# Patient Record
Sex: MALE | Race: BLACK OR AFRICAN AMERICAN | NOT HISPANIC OR LATINO | Employment: OTHER | URBAN - METROPOLITAN AREA
[De-identification: names, ages, dates, MRNs, and addresses within clinical notes are randomized per-mention and may not be internally consistent; named-entity substitution may affect disease eponyms.]

---

## 2019-12-19 ENCOUNTER — APPOINTMENT (OUTPATIENT)
Dept: GENERAL RADIOLOGY | Facility: HOSPITAL | Age: 59
End: 2019-12-19

## 2019-12-19 ENCOUNTER — HOSPITAL ENCOUNTER (INPATIENT)
Facility: HOSPITAL | Age: 59
LOS: 13 days | Discharge: HOME | End: 2020-01-01
Attending: EMERGENCY MEDICINE | Admitting: INTERNAL MEDICINE

## 2019-12-19 DIAGNOSIS — M25.532 LEFT WRIST PAIN: Primary | ICD-10-CM

## 2019-12-19 DIAGNOSIS — M79.5 FOREIGN BODY (FB) IN SOFT TISSUE: ICD-10-CM

## 2019-12-19 DIAGNOSIS — R52 INTRACTABLE PAIN: ICD-10-CM

## 2019-12-19 DIAGNOSIS — E13.9 DIABETES MELLITUS OF OTHER TYPE WITHOUT COMPLICATION, UNSPECIFIED WHETHER LONG TERM INSULIN USE (HCC): ICD-10-CM

## 2019-12-19 DIAGNOSIS — M79.642 LEFT HAND PAIN: ICD-10-CM

## 2019-12-19 PROBLEM — F11.21 OPIOID USE DISORDER, MODERATE, IN SUSTAINED REMISSION: Chronic | Status: ACTIVE | Noted: 2019-12-19

## 2019-12-19 PROBLEM — M25.432 PAIN AND SWELLING OF WRIST, LEFT: Status: ACTIVE | Noted: 2019-12-19

## 2019-12-19 PROBLEM — F43.10 PTSD (POST-TRAUMATIC STRESS DISORDER): Chronic | Status: ACTIVE | Noted: 2019-12-19

## 2019-12-19 PROBLEM — D64.9 ANEMIA: Status: ACTIVE | Noted: 2019-12-19

## 2019-12-19 PROBLEM — F32.A DEPRESSION: Chronic | Status: ACTIVE | Noted: 2019-12-19

## 2019-12-19 PROBLEM — Z86.14 HISTORY OF METHICILLIN RESISTANT STAPHYLOCOCCUS AUREUS (MRSA): Status: ACTIVE | Noted: 2019-12-19

## 2019-12-19 LAB
ALBUMIN SERPL-MCNC: 4.2 G/DL (ref 3.5–5.2)
ALBUMIN/GLOB SERPL: 0.9 G/DL
ALP SERPL-CCNC: 114 U/L (ref 39–117)
ALT SERPL W P-5'-P-CCNC: 20 U/L (ref 1–41)
ANION GAP SERPL CALCULATED.3IONS-SCNC: 10.8 MMOL/L (ref 5–15)
ANION GAP SERPL CALCULATED.3IONS-SCNC: 8 MMOL/L (ref 5–15)
AST SERPL-CCNC: 25 U/L (ref 1–40)
BASOPHILS # BLD AUTO: 0.03 10*3/MM3 (ref 0–0.2)
BASOPHILS NFR BLD AUTO: 0.5 % (ref 0–1.5)
BILIRUB SERPL-MCNC: 0.4 MG/DL (ref 0.2–1.2)
BUN BLD-MCNC: 16 MG/DL (ref 6–20)
BUN BLD-MCNC: 16 MG/DL (ref 6–20)
BUN/CREAT SERPL: 18.2 (ref 7–25)
BUN/CREAT SERPL: 19 (ref 7–25)
CALCIUM SPEC-SCNC: 8.5 MG/DL (ref 8.6–10.5)
CALCIUM SPEC-SCNC: 9 MG/DL (ref 8.6–10.5)
CHLORIDE SERPL-SCNC: 101 MMOL/L (ref 98–107)
CHLORIDE SERPL-SCNC: 102 MMOL/L (ref 98–107)
CO2 SERPL-SCNC: 25.2 MMOL/L (ref 22–29)
CO2 SERPL-SCNC: 31 MMOL/L (ref 22–29)
CREAT BLD-MCNC: 0.84 MG/DL (ref 0.76–1.27)
CREAT BLD-MCNC: 0.88 MG/DL (ref 0.76–1.27)
CRP SERPL-MCNC: 0.21 MG/DL (ref 0–0.5)
D-LACTATE SERPL-SCNC: 1.3 MMOL/L (ref 0.5–2)
DEPRECATED RDW RBC AUTO: 47.1 FL (ref 37–54)
EOSINOPHIL # BLD AUTO: 0.08 10*3/MM3 (ref 0–0.4)
EOSINOPHIL NFR BLD AUTO: 1.2 % (ref 0.3–6.2)
ERYTHROCYTE [DISTWIDTH] IN BLOOD BY AUTOMATED COUNT: 15.3 % (ref 12.3–15.4)
ERYTHROCYTE [SEDIMENTATION RATE] IN BLOOD: 47 MM/HR (ref 0–20)
GFR SERPL CREATININE-BSD FRML MDRD: 107 ML/MIN/1.73
GFR SERPL CREATININE-BSD FRML MDRD: 113 ML/MIN/1.73
GLOBULIN UR ELPH-MCNC: 4.5 GM/DL
GLUCOSE BLD-MCNC: 97 MG/DL (ref 65–99)
GLUCOSE BLD-MCNC: 99 MG/DL (ref 65–99)
GLUCOSE BLDC GLUCOMTR-MCNC: 104 MG/DL (ref 70–130)
GLUCOSE BLDC GLUCOMTR-MCNC: 117 MG/DL (ref 70–130)
GLUCOSE BLDC GLUCOMTR-MCNC: 122 MG/DL (ref 70–130)
GLUCOSE BLDC GLUCOMTR-MCNC: 140 MG/DL (ref 70–130)
HBA1C MFR BLD: 5.5 % (ref 4.8–5.6)
HCT VFR BLD AUTO: 32.6 % (ref 37.5–51)
HGB BLD-MCNC: 10.5 G/DL (ref 13–17.7)
IMM GRANULOCYTES # BLD AUTO: 0.01 10*3/MM3 (ref 0–0.05)
IMM GRANULOCYTES NFR BLD AUTO: 0.2 % (ref 0–0.5)
LYMPHOCYTES # BLD AUTO: 1.23 10*3/MM3 (ref 0.7–3.1)
LYMPHOCYTES NFR BLD AUTO: 19.1 % (ref 19.6–45.3)
MCH RBC QN AUTO: 27.3 PG (ref 26.6–33)
MCHC RBC AUTO-ENTMCNC: 32.2 G/DL (ref 31.5–35.7)
MCV RBC AUTO: 84.9 FL (ref 79–97)
MONOCYTES # BLD AUTO: 0.68 10*3/MM3 (ref 0.1–0.9)
MONOCYTES NFR BLD AUTO: 10.6 % (ref 5–12)
MRSA DNA SPEC QL NAA+PROBE: ABNORMAL
NEUTROPHILS # BLD AUTO: 4.4 10*3/MM3 (ref 1.7–7)
NEUTROPHILS NFR BLD AUTO: 68.4 % (ref 42.7–76)
NRBC BLD AUTO-RTO: 0 /100 WBC (ref 0–0.2)
PLATELET # BLD AUTO: 196 10*3/MM3 (ref 140–450)
PMV BLD AUTO: 10.2 FL (ref 6–12)
POTASSIUM BLD-SCNC: 4.3 MMOL/L (ref 3.5–5.2)
POTASSIUM BLD-SCNC: 4.6 MMOL/L (ref 3.5–5.2)
PROT SERPL-MCNC: 8.7 G/DL (ref 6–8.5)
RBC # BLD AUTO: 3.84 10*6/MM3 (ref 4.14–5.8)
SODIUM BLD-SCNC: 138 MMOL/L (ref 136–145)
SODIUM BLD-SCNC: 140 MMOL/L (ref 136–145)
URATE SERPL-MCNC: 5.5 MG/DL (ref 3.4–7)
WBC NRBC COR # BLD: 6.43 10*3/MM3 (ref 3.4–10.8)

## 2019-12-19 PROCEDURE — 25010000002 ONDANSETRON PER 1 MG: Performed by: EMERGENCY MEDICINE

## 2019-12-19 PROCEDURE — 80053 COMPREHEN METABOLIC PANEL: CPT | Performed by: EMERGENCY MEDICINE

## 2019-12-19 PROCEDURE — 82962 GLUCOSE BLOOD TEST: CPT

## 2019-12-19 PROCEDURE — 83605 ASSAY OF LACTIC ACID: CPT | Performed by: PHYSICIAN ASSISTANT

## 2019-12-19 PROCEDURE — G0378 HOSPITAL OBSERVATION PER HR: HCPCS

## 2019-12-19 PROCEDURE — 85025 COMPLETE CBC W/AUTO DIFF WBC: CPT | Performed by: EMERGENCY MEDICINE

## 2019-12-19 PROCEDURE — 25010000002 KETOROLAC TROMETHAMINE PER 15 MG: Performed by: HOSPITALIST

## 2019-12-19 PROCEDURE — 36415 COLL VENOUS BLD VENIPUNCTURE: CPT | Performed by: EMERGENCY MEDICINE

## 2019-12-19 PROCEDURE — 25010000002 HYDROMORPHONE 1 MG/ML SOLUTION: Performed by: EMERGENCY MEDICINE

## 2019-12-19 PROCEDURE — 99223 1ST HOSP IP/OBS HIGH 75: CPT | Performed by: INTERNAL MEDICINE

## 2019-12-19 PROCEDURE — 73110 X-RAY EXAM OF WRIST: CPT

## 2019-12-19 PROCEDURE — 84550 ASSAY OF BLOOD/URIC ACID: CPT | Performed by: HOSPITALIST

## 2019-12-19 PROCEDURE — 25010000002 HYDROMORPHONE PER 4 MG: Performed by: EMERGENCY MEDICINE

## 2019-12-19 PROCEDURE — 86140 C-REACTIVE PROTEIN: CPT | Performed by: EMERGENCY MEDICINE

## 2019-12-19 PROCEDURE — 25010000002 PIPERACILLIN SOD-TAZOBACTAM PER 1 G: Performed by: HOSPITALIST

## 2019-12-19 PROCEDURE — 83036 HEMOGLOBIN GLYCOSYLATED A1C: CPT | Performed by: NURSE PRACTITIONER

## 2019-12-19 PROCEDURE — 87040 BLOOD CULTURE FOR BACTERIA: CPT | Performed by: HOSPITALIST

## 2019-12-19 PROCEDURE — 87641 MR-STAPH DNA AMP PROBE: CPT | Performed by: HOSPITALIST

## 2019-12-19 PROCEDURE — 25010000002 VANCOMYCIN 10 G RECONSTITUTED SOLUTION: Performed by: HOSPITALIST

## 2019-12-19 PROCEDURE — 36415 COLL VENOUS BLD VENIPUNCTURE: CPT

## 2019-12-19 PROCEDURE — 25010000003 CEFTRIAXONE PER 250 MG: Performed by: INTERNAL MEDICINE

## 2019-12-19 PROCEDURE — 99284 EMERGENCY DEPT VISIT MOD MDM: CPT

## 2019-12-19 PROCEDURE — 85652 RBC SED RATE AUTOMATED: CPT | Performed by: EMERGENCY MEDICINE

## 2019-12-19 RX ORDER — KETOROLAC TROMETHAMINE 30 MG/ML
30 INJECTION, SOLUTION INTRAMUSCULAR; INTRAVENOUS EVERY 6 HOURS PRN
Status: DISPENSED | OUTPATIENT
Start: 2019-12-19 | End: 2019-12-24

## 2019-12-19 RX ORDER — DEXTROSE MONOHYDRATE 25 G/50ML
25 INJECTION, SOLUTION INTRAVENOUS
Status: DISCONTINUED | OUTPATIENT
Start: 2019-12-19 | End: 2020-01-01 | Stop reason: HOSPADM

## 2019-12-19 RX ORDER — SODIUM CHLORIDE 0.9 % (FLUSH) 0.9 %
10 SYRINGE (ML) INJECTION AS NEEDED
Status: DISCONTINUED | OUTPATIENT
Start: 2019-12-19 | End: 2019-12-25

## 2019-12-19 RX ORDER — ONDANSETRON 4 MG/1
4 TABLET, ORALLY DISINTEGRATING ORAL ONCE
Status: COMPLETED | OUTPATIENT
Start: 2019-12-19 | End: 2019-12-19

## 2019-12-19 RX ORDER — ONDANSETRON 2 MG/ML
4 INJECTION INTRAMUSCULAR; INTRAVENOUS ONCE
Status: COMPLETED | OUTPATIENT
Start: 2019-12-19 | End: 2019-12-19

## 2019-12-19 RX ORDER — CALCIUM CARBONATE 200(500)MG
2 TABLET,CHEWABLE ORAL 2 TIMES DAILY PRN
Status: DISCONTINUED | OUTPATIENT
Start: 2019-12-19 | End: 2020-01-01 | Stop reason: HOSPADM

## 2019-12-19 RX ORDER — HYDROMORPHONE HYDROCHLORIDE 1 MG/ML
0.5 INJECTION, SOLUTION INTRAMUSCULAR; INTRAVENOUS; SUBCUTANEOUS ONCE
Status: COMPLETED | OUTPATIENT
Start: 2019-12-19 | End: 2019-12-19

## 2019-12-19 RX ORDER — ACETAMINOPHEN 325 MG/1
650 TABLET ORAL EVERY 4 HOURS PRN
Status: DISCONTINUED | OUTPATIENT
Start: 2019-12-19 | End: 2020-01-01 | Stop reason: HOSPADM

## 2019-12-19 RX ORDER — ONDANSETRON 2 MG/ML
4 INJECTION INTRAMUSCULAR; INTRAVENOUS EVERY 6 HOURS PRN
Status: DISCONTINUED | OUTPATIENT
Start: 2019-12-19 | End: 2020-01-01 | Stop reason: HOSPADM

## 2019-12-19 RX ORDER — HYDROXYZINE 50 MG/1
50 TABLET, FILM COATED ORAL 3 TIMES DAILY PRN
Status: DISCONTINUED | OUTPATIENT
Start: 2019-12-19 | End: 2020-01-01 | Stop reason: HOSPADM

## 2019-12-19 RX ORDER — NICOTINE POLACRILEX 4 MG
15 LOZENGE BUCCAL
Status: DISCONTINUED | OUTPATIENT
Start: 2019-12-19 | End: 2020-01-01 | Stop reason: HOSPADM

## 2019-12-19 RX ORDER — LORAZEPAM 2 MG/ML
1 INJECTION INTRAMUSCULAR ONCE
Status: COMPLETED | OUTPATIENT
Start: 2019-12-19 | End: 2019-12-20

## 2019-12-19 RX ORDER — ACETAMINOPHEN 650 MG/1
650 SUPPOSITORY RECTAL EVERY 4 HOURS PRN
Status: DISCONTINUED | OUTPATIENT
Start: 2019-12-19 | End: 2020-01-01 | Stop reason: HOSPADM

## 2019-12-19 RX ORDER — OXYCODONE HYDROCHLORIDE AND ACETAMINOPHEN 5; 325 MG/1; MG/1
2 TABLET ORAL ONCE
Status: COMPLETED | OUTPATIENT
Start: 2019-12-19 | End: 2019-12-19

## 2019-12-19 RX ORDER — CARVEDILOL 3.12 MG/1
3.12 TABLET ORAL EVERY 12 HOURS SCHEDULED
Status: DISCONTINUED | OUTPATIENT
Start: 2019-12-19 | End: 2020-01-01 | Stop reason: HOSPADM

## 2019-12-19 RX ORDER — BISACODYL 5 MG/1
5 TABLET, DELAYED RELEASE ORAL DAILY PRN
Status: DISCONTINUED | OUTPATIENT
Start: 2019-12-19 | End: 2020-01-01 | Stop reason: HOSPADM

## 2019-12-19 RX ORDER — ACETAMINOPHEN 160 MG/5ML
650 SOLUTION ORAL EVERY 4 HOURS PRN
Status: DISCONTINUED | OUTPATIENT
Start: 2019-12-19 | End: 2020-01-01 | Stop reason: HOSPADM

## 2019-12-19 RX ORDER — ONDANSETRON 4 MG/1
4 TABLET, FILM COATED ORAL EVERY 6 HOURS PRN
Status: DISCONTINUED | OUTPATIENT
Start: 2019-12-19 | End: 2020-01-01 | Stop reason: HOSPADM

## 2019-12-19 RX ORDER — SODIUM CHLORIDE 0.9 % (FLUSH) 0.9 %
10 SYRINGE (ML) INJECTION EVERY 12 HOURS SCHEDULED
Status: DISCONTINUED | OUTPATIENT
Start: 2019-12-19 | End: 2019-12-25

## 2019-12-19 RX ORDER — PRAVASTATIN SODIUM 40 MG
40 TABLET ORAL DAILY
Status: DISCONTINUED | OUTPATIENT
Start: 2019-12-19 | End: 2020-01-01 | Stop reason: HOSPADM

## 2019-12-19 RX ORDER — CEFTRIAXONE SODIUM 2 G/50ML
2 INJECTION, SOLUTION INTRAVENOUS EVERY 24 HOURS
Status: DISCONTINUED | OUTPATIENT
Start: 2019-12-19 | End: 2019-12-24

## 2019-12-19 RX ADMIN — TAZOBACTAM SODIUM AND PIPERACILLIN SODIUM 3.38 G: 375; 3 INJECTION, SOLUTION INTRAVENOUS at 13:06

## 2019-12-19 RX ADMIN — SODIUM CHLORIDE, PRESERVATIVE FREE 10 ML: 5 INJECTION INTRAVENOUS at 02:21

## 2019-12-19 RX ADMIN — CARVEDILOL 3.12 MG: 3.12 TABLET, FILM COATED ORAL at 13:42

## 2019-12-19 RX ADMIN — KETOROLAC TROMETHAMINE 30 MG: 30 INJECTION, SOLUTION INTRAMUSCULAR at 09:22

## 2019-12-19 RX ADMIN — KETOROLAC TROMETHAMINE 30 MG: 30 INJECTION, SOLUTION INTRAMUSCULAR at 22:22

## 2019-12-19 RX ADMIN — ACETAMINOPHEN 650 MG: 325 TABLET, FILM COATED ORAL at 08:16

## 2019-12-19 RX ADMIN — CEFTRIAXONE SODIUM 2 G: 2 INJECTION, SOLUTION INTRAVENOUS at 14:41

## 2019-12-19 RX ADMIN — HYDROXYZINE HYDROCHLORIDE 50 MG: 50 TABLET ORAL at 22:22

## 2019-12-19 RX ADMIN — CARVEDILOL 3.12 MG: 3.12 TABLET, FILM COATED ORAL at 21:34

## 2019-12-19 RX ADMIN — SODIUM CHLORIDE 1000 ML: 9 INJECTION, SOLUTION INTRAVENOUS at 05:22

## 2019-12-19 RX ADMIN — VANCOMYCIN HYDROCHLORIDE 1750 MG: 10 INJECTION, POWDER, LYOPHILIZED, FOR SOLUTION INTRAVENOUS at 13:06

## 2019-12-19 RX ADMIN — SODIUM CHLORIDE, PRESERVATIVE FREE 10 ML: 5 INJECTION INTRAVENOUS at 08:17

## 2019-12-19 RX ADMIN — ONDANSETRON 4 MG: 4 TABLET, ORALLY DISINTEGRATING ORAL at 01:25

## 2019-12-19 RX ADMIN — PRAVASTATIN SODIUM 40 MG: 40 TABLET ORAL at 14:41

## 2019-12-19 RX ADMIN — HYDROMORPHONE HYDROCHLORIDE 1 MG: 1 INJECTION, SOLUTION INTRAMUSCULAR; INTRAVENOUS; SUBCUTANEOUS at 02:21

## 2019-12-19 RX ADMIN — SODIUM CHLORIDE, PRESERVATIVE FREE 10 ML: 5 INJECTION INTRAVENOUS at 21:34

## 2019-12-19 RX ADMIN — HYDROMORPHONE HYDROCHLORIDE 0.5 MG: 1 INJECTION, SOLUTION INTRAMUSCULAR; INTRAVENOUS; SUBCUTANEOUS at 05:42

## 2019-12-19 RX ADMIN — ACETAMINOPHEN 650 MG: 325 TABLET, FILM COATED ORAL at 13:41

## 2019-12-19 RX ADMIN — HYDROXYZINE HYDROCHLORIDE 50 MG: 50 TABLET ORAL at 13:42

## 2019-12-19 RX ADMIN — KETOROLAC TROMETHAMINE 30 MG: 30 INJECTION, SOLUTION INTRAMUSCULAR at 16:21

## 2019-12-19 RX ADMIN — ACETAMINOPHEN 650 MG: 325 TABLET, FILM COATED ORAL at 19:48

## 2019-12-19 RX ADMIN — OXYCODONE AND ACETAMINOPHEN 2 TABLET: 5; 325 TABLET ORAL at 01:25

## 2019-12-19 RX ADMIN — ONDANSETRON 4 MG: 2 INJECTION INTRAMUSCULAR; INTRAVENOUS at 02:20

## 2019-12-20 ENCOUNTER — APPOINTMENT (OUTPATIENT)
Dept: MRI IMAGING | Facility: HOSPITAL | Age: 59
End: 2019-12-20

## 2019-12-20 PROBLEM — L03.114 CELLULITIS OF LEFT HAND: Status: ACTIVE | Noted: 2019-12-20

## 2019-12-20 PROBLEM — D50.9 IRON DEFICIENCY ANEMIA: Status: ACTIVE | Noted: 2019-12-20

## 2019-12-20 LAB
AMPHET+METHAMPHET UR QL: NEGATIVE
ANION GAP SERPL CALCULATED.3IONS-SCNC: 13.1 MMOL/L (ref 5–15)
BARBITURATES UR QL SCN: NEGATIVE
BASOPHILS # BLD AUTO: 0.03 10*3/MM3 (ref 0–0.2)
BASOPHILS NFR BLD AUTO: 0.4 % (ref 0–1.5)
BENZODIAZ UR QL SCN: NEGATIVE
BUN BLD-MCNC: 13 MG/DL (ref 6–20)
BUN/CREAT SERPL: 19.4 (ref 7–25)
CALCIUM SPEC-SCNC: 8.5 MG/DL (ref 8.6–10.5)
CANNABINOIDS SERPL QL: NEGATIVE
CHLORIDE SERPL-SCNC: 105 MMOL/L (ref 98–107)
CO2 SERPL-SCNC: 23.9 MMOL/L (ref 22–29)
COCAINE UR QL: POSITIVE
CREAT BLD-MCNC: 0.67 MG/DL (ref 0.76–1.27)
DEPRECATED RDW RBC AUTO: 45.4 FL (ref 37–54)
EOSINOPHIL # BLD AUTO: 0.07 10*3/MM3 (ref 0–0.4)
EOSINOPHIL NFR BLD AUTO: 0.8 % (ref 0.3–6.2)
ERYTHROCYTE [DISTWIDTH] IN BLOOD BY AUTOMATED COUNT: 15 % (ref 12.3–15.4)
FERRITIN SERPL-MCNC: 32.3 NG/ML (ref 30–400)
FOLATE SERPL-MCNC: 5.67 NG/ML (ref 4.78–24.2)
GFR SERPL CREATININE-BSD FRML MDRD: 147 ML/MIN/1.73
GLUCOSE BLD-MCNC: 98 MG/DL (ref 65–99)
GLUCOSE BLDC GLUCOMTR-MCNC: 140 MG/DL (ref 70–130)
GLUCOSE BLDC GLUCOMTR-MCNC: 150 MG/DL (ref 70–130)
GLUCOSE BLDC GLUCOMTR-MCNC: 181 MG/DL (ref 70–130)
GLUCOSE BLDC GLUCOMTR-MCNC: 91 MG/DL (ref 70–130)
HCT VFR BLD AUTO: 31.1 % (ref 37.5–51)
HGB BLD-MCNC: 10.6 G/DL (ref 13–17.7)
IMM GRANULOCYTES # BLD AUTO: 0.03 10*3/MM3 (ref 0–0.05)
IMM GRANULOCYTES NFR BLD AUTO: 0.4 % (ref 0–0.5)
IRON 24H UR-MRATE: 18 MCG/DL (ref 59–158)
IRON SATN MFR SERPL: 5 % (ref 20–50)
LYMPHOCYTES # BLD AUTO: 1.08 10*3/MM3 (ref 0.7–3.1)
LYMPHOCYTES NFR BLD AUTO: 13 % (ref 19.6–45.3)
MCH RBC QN AUTO: 28 PG (ref 26.6–33)
MCHC RBC AUTO-ENTMCNC: 34.1 G/DL (ref 31.5–35.7)
MCV RBC AUTO: 82.3 FL (ref 79–97)
METHADONE UR QL SCN: NEGATIVE
MONOCYTES # BLD AUTO: 0.63 10*3/MM3 (ref 0.1–0.9)
MONOCYTES NFR BLD AUTO: 7.6 % (ref 5–12)
NEUTROPHILS # BLD AUTO: 6.46 10*3/MM3 (ref 1.7–7)
NEUTROPHILS NFR BLD AUTO: 77.8 % (ref 42.7–76)
NRBC BLD AUTO-RTO: 0 /100 WBC (ref 0–0.2)
OPIATES UR QL: POSITIVE
OXYCODONE UR QL SCN: POSITIVE
PLATELET # BLD AUTO: 172 10*3/MM3 (ref 140–450)
PMV BLD AUTO: 10.2 FL (ref 6–12)
POTASSIUM BLD-SCNC: 4.3 MMOL/L (ref 3.5–5.2)
RBC # BLD AUTO: 3.78 10*6/MM3 (ref 4.14–5.8)
SODIUM BLD-SCNC: 142 MMOL/L (ref 136–145)
TIBC SERPL-MCNC: 350 MCG/DL (ref 298–536)
TRANSFERRIN SERPL-MCNC: 235 MG/DL (ref 200–360)
VIT B12 BLD-MCNC: 550 PG/ML (ref 211–946)
WBC NRBC COR # BLD: 8.3 10*3/MM3 (ref 3.4–10.8)

## 2019-12-20 PROCEDURE — 80307 DRUG TEST PRSMV CHEM ANLYZR: CPT | Performed by: INTERNAL MEDICINE

## 2019-12-20 PROCEDURE — 25010000002 LORAZEPAM PER 2 MG: Performed by: HOSPITALIST

## 2019-12-20 PROCEDURE — 25010000002 VANCOMYCIN 10 G RECONSTITUTED SOLUTION: Performed by: NURSE PRACTITIONER

## 2019-12-20 PROCEDURE — 25010000003 CEFTRIAXONE PER 250 MG: Performed by: INTERNAL MEDICINE

## 2019-12-20 PROCEDURE — 82728 ASSAY OF FERRITIN: CPT | Performed by: HOSPITALIST

## 2019-12-20 PROCEDURE — 80048 BASIC METABOLIC PNL TOTAL CA: CPT | Performed by: HOSPITALIST

## 2019-12-20 PROCEDURE — 83540 ASSAY OF IRON: CPT | Performed by: HOSPITALIST

## 2019-12-20 PROCEDURE — 82607 VITAMIN B-12: CPT | Performed by: HOSPITALIST

## 2019-12-20 PROCEDURE — 82746 ASSAY OF FOLIC ACID SERUM: CPT | Performed by: HOSPITALIST

## 2019-12-20 PROCEDURE — 84466 ASSAY OF TRANSFERRIN: CPT | Performed by: HOSPITALIST

## 2019-12-20 PROCEDURE — 25010000002 KETOROLAC TROMETHAMINE PER 15 MG: Performed by: HOSPITALIST

## 2019-12-20 PROCEDURE — 99232 SBSQ HOSP IP/OBS MODERATE 35: CPT | Performed by: INTERNAL MEDICINE

## 2019-12-20 PROCEDURE — 85025 COMPLETE CBC W/AUTO DIFF WBC: CPT | Performed by: HOSPITALIST

## 2019-12-20 PROCEDURE — 63710000001 INSULIN LISPRO (HUMAN) PER 5 UNITS: Performed by: HOSPITALIST

## 2019-12-20 PROCEDURE — 82962 GLUCOSE BLOOD TEST: CPT

## 2019-12-20 RX ORDER — TRAMADOL HYDROCHLORIDE 50 MG/1
50 TABLET ORAL EVERY 6 HOURS PRN
Status: DISPENSED | OUTPATIENT
Start: 2019-12-20 | End: 2019-12-30

## 2019-12-20 RX ADMIN — INSULIN LISPRO 3 UNITS: 100 INJECTION, SOLUTION INTRAVENOUS; SUBCUTANEOUS at 14:22

## 2019-12-20 RX ADMIN — SODIUM CHLORIDE, PRESERVATIVE FREE 10 ML: 5 INJECTION INTRAVENOUS at 11:19

## 2019-12-20 RX ADMIN — KETOROLAC TROMETHAMINE 30 MG: 30 INJECTION, SOLUTION INTRAMUSCULAR at 17:19

## 2019-12-20 RX ADMIN — TRAMADOL HYDROCHLORIDE 50 MG: 50 TABLET, FILM COATED ORAL at 18:23

## 2019-12-20 RX ADMIN — KETOROLAC TROMETHAMINE 30 MG: 30 INJECTION, SOLUTION INTRAMUSCULAR at 05:04

## 2019-12-20 RX ADMIN — CARVEDILOL 3.12 MG: 3.12 TABLET, FILM COATED ORAL at 22:12

## 2019-12-20 RX ADMIN — INSULIN LISPRO 3 UNITS: 100 INJECTION, SOLUTION INTRAVENOUS; SUBCUTANEOUS at 22:12

## 2019-12-20 RX ADMIN — ACETAMINOPHEN 650 MG: 325 TABLET, FILM COATED ORAL at 10:34

## 2019-12-20 RX ADMIN — TRAMADOL HYDROCHLORIDE 50 MG: 50 TABLET, FILM COATED ORAL at 11:19

## 2019-12-20 RX ADMIN — LORAZEPAM 1 MG: 2 INJECTION INTRAMUSCULAR; INTRAVENOUS at 18:36

## 2019-12-20 RX ADMIN — SODIUM CHLORIDE, PRESERVATIVE FREE 10 ML: 5 INJECTION INTRAVENOUS at 22:13

## 2019-12-20 RX ADMIN — KETOROLAC TROMETHAMINE 30 MG: 30 INJECTION, SOLUTION INTRAMUSCULAR at 11:19

## 2019-12-20 RX ADMIN — CARVEDILOL 3.12 MG: 3.12 TABLET, FILM COATED ORAL at 10:34

## 2019-12-20 RX ADMIN — ACETAMINOPHEN 650 MG: 325 TABLET, FILM COATED ORAL at 06:59

## 2019-12-20 RX ADMIN — CEFTRIAXONE SODIUM 2 G: 2 INJECTION, SOLUTION INTRAVENOUS at 14:23

## 2019-12-20 RX ADMIN — VANCOMYCIN HYDROCHLORIDE 1500 MG: 10 INJECTION, POWDER, LYOPHILIZED, FOR SOLUTION INTRAVENOUS at 11:19

## 2019-12-20 RX ADMIN — PRAVASTATIN SODIUM 40 MG: 40 TABLET ORAL at 10:34

## 2019-12-20 RX ADMIN — VANCOMYCIN HYDROCHLORIDE 1500 MG: 10 INJECTION, POWDER, LYOPHILIZED, FOR SOLUTION INTRAVENOUS at 00:08

## 2019-12-20 RX ADMIN — ACETAMINOPHEN 650 MG: 325 TABLET, FILM COATED ORAL at 00:08

## 2019-12-20 RX ADMIN — SODIUM CHLORIDE, PRESERVATIVE FREE 10 ML: 5 INJECTION INTRAVENOUS at 14:23

## 2019-12-21 LAB
ALBUMIN SERPL-MCNC: 3.4 G/DL (ref 3.5–5.2)
ALBUMIN/GLOB SERPL: 0.8 G/DL
ALP SERPL-CCNC: 115 U/L (ref 39–117)
ALT SERPL W P-5'-P-CCNC: 16 U/L (ref 1–41)
ANION GAP SERPL CALCULATED.3IONS-SCNC: 13.1 MMOL/L (ref 5–15)
AST SERPL-CCNC: 19 U/L (ref 1–40)
BASOPHILS # BLD AUTO: 0.03 10*3/MM3 (ref 0–0.2)
BASOPHILS NFR BLD AUTO: 0.4 % (ref 0–1.5)
BILIRUB SERPL-MCNC: 0.5 MG/DL (ref 0.2–1.2)
BUN BLD-MCNC: 12 MG/DL (ref 6–20)
BUN/CREAT SERPL: 16.7 (ref 7–25)
CALCIUM SPEC-SCNC: 8.4 MG/DL (ref 8.6–10.5)
CHLORIDE SERPL-SCNC: 99 MMOL/L (ref 98–107)
CO2 SERPL-SCNC: 23.9 MMOL/L (ref 22–29)
CREAT BLD-MCNC: 0.72 MG/DL (ref 0.76–1.27)
DEPRECATED RDW RBC AUTO: 43.9 FL (ref 37–54)
EOSINOPHIL # BLD AUTO: 0.1 10*3/MM3 (ref 0–0.4)
EOSINOPHIL NFR BLD AUTO: 1.3 % (ref 0.3–6.2)
ERYTHROCYTE [DISTWIDTH] IN BLOOD BY AUTOMATED COUNT: 14.8 % (ref 12.3–15.4)
GFR SERPL CREATININE-BSD FRML MDRD: 135 ML/MIN/1.73
GLOBULIN UR ELPH-MCNC: 4.3 GM/DL
GLUCOSE BLD-MCNC: 96 MG/DL (ref 65–99)
GLUCOSE BLDC GLUCOMTR-MCNC: 115 MG/DL (ref 70–130)
GLUCOSE BLDC GLUCOMTR-MCNC: 123 MG/DL (ref 70–130)
GLUCOSE BLDC GLUCOMTR-MCNC: 124 MG/DL (ref 70–130)
GLUCOSE BLDC GLUCOMTR-MCNC: 138 MG/DL (ref 70–130)
HCT VFR BLD AUTO: 30.7 % (ref 37.5–51)
HGB BLD-MCNC: 10.1 G/DL (ref 13–17.7)
IMM GRANULOCYTES # BLD AUTO: 0.02 10*3/MM3 (ref 0–0.05)
IMM GRANULOCYTES NFR BLD AUTO: 0.3 % (ref 0–0.5)
LYMPHOCYTES # BLD AUTO: 1.33 10*3/MM3 (ref 0.7–3.1)
LYMPHOCYTES NFR BLD AUTO: 17.2 % (ref 19.6–45.3)
MCH RBC QN AUTO: 26.9 PG (ref 26.6–33)
MCHC RBC AUTO-ENTMCNC: 32.9 G/DL (ref 31.5–35.7)
MCV RBC AUTO: 81.6 FL (ref 79–97)
MONOCYTES # BLD AUTO: 0.74 10*3/MM3 (ref 0.1–0.9)
MONOCYTES NFR BLD AUTO: 9.6 % (ref 5–12)
NEUTROPHILS # BLD AUTO: 5.52 10*3/MM3 (ref 1.7–7)
NEUTROPHILS NFR BLD AUTO: 71.2 % (ref 42.7–76)
NRBC BLD AUTO-RTO: 0 /100 WBC (ref 0–0.2)
PLATELET # BLD AUTO: 197 10*3/MM3 (ref 140–450)
PMV BLD AUTO: 10.4 FL (ref 6–12)
POTASSIUM BLD-SCNC: 4.4 MMOL/L (ref 3.5–5.2)
PROT SERPL-MCNC: 7.7 G/DL (ref 6–8.5)
RBC # BLD AUTO: 3.76 10*6/MM3 (ref 4.14–5.8)
SODIUM BLD-SCNC: 136 MMOL/L (ref 136–145)
VANCOMYCIN TROUGH SERPL-MCNC: 12.1 MCG/ML (ref 5–20)
WBC NRBC COR # BLD: 7.74 10*3/MM3 (ref 3.4–10.8)

## 2019-12-21 PROCEDURE — 80053 COMPREHEN METABOLIC PANEL: CPT | Performed by: HOSPITALIST

## 2019-12-21 PROCEDURE — 25010000003 CEFTRIAXONE PER 250 MG: Performed by: INTERNAL MEDICINE

## 2019-12-21 PROCEDURE — 25010000002 VANCOMYCIN 10 G RECONSTITUTED SOLUTION: Performed by: NURSE PRACTITIONER

## 2019-12-21 PROCEDURE — 25010000002 KETOROLAC TROMETHAMINE PER 15 MG: Performed by: HOSPITALIST

## 2019-12-21 PROCEDURE — 99232 SBSQ HOSP IP/OBS MODERATE 35: CPT | Performed by: INTERNAL MEDICINE

## 2019-12-21 PROCEDURE — 85025 COMPLETE CBC W/AUTO DIFF WBC: CPT | Performed by: HOSPITALIST

## 2019-12-21 PROCEDURE — 80202 ASSAY OF VANCOMYCIN: CPT | Performed by: NURSE PRACTITIONER

## 2019-12-21 PROCEDURE — 25010000002 ONDANSETRON PER 1 MG: Performed by: HOSPITALIST

## 2019-12-21 PROCEDURE — 82962 GLUCOSE BLOOD TEST: CPT

## 2019-12-21 RX ADMIN — KETOROLAC TROMETHAMINE 30 MG: 30 INJECTION, SOLUTION INTRAMUSCULAR at 21:23

## 2019-12-21 RX ADMIN — KETOROLAC TROMETHAMINE 30 MG: 30 INJECTION, SOLUTION INTRAMUSCULAR at 09:05

## 2019-12-21 RX ADMIN — VANCOMYCIN HYDROCHLORIDE 1500 MG: 10 INJECTION, POWDER, LYOPHILIZED, FOR SOLUTION INTRAVENOUS at 12:47

## 2019-12-21 RX ADMIN — CARVEDILOL 3.12 MG: 3.12 TABLET, FILM COATED ORAL at 21:23

## 2019-12-21 RX ADMIN — PRAVASTATIN SODIUM 40 MG: 40 TABLET ORAL at 08:40

## 2019-12-21 RX ADMIN — VANCOMYCIN HYDROCHLORIDE 1500 MG: 10 INJECTION, POWDER, LYOPHILIZED, FOR SOLUTION INTRAVENOUS at 00:05

## 2019-12-21 RX ADMIN — TRAMADOL HYDROCHLORIDE 50 MG: 50 TABLET, FILM COATED ORAL at 15:01

## 2019-12-21 RX ADMIN — KETOROLAC TROMETHAMINE 30 MG: 30 INJECTION, SOLUTION INTRAMUSCULAR at 15:01

## 2019-12-21 RX ADMIN — TRAMADOL HYDROCHLORIDE 50 MG: 50 TABLET, FILM COATED ORAL at 02:34

## 2019-12-21 RX ADMIN — SODIUM CHLORIDE, PRESERVATIVE FREE 10 ML: 5 INJECTION INTRAVENOUS at 08:42

## 2019-12-21 RX ADMIN — ONDANSETRON 4 MG: 2 INJECTION INTRAMUSCULAR; INTRAVENOUS at 15:14

## 2019-12-21 RX ADMIN — CARVEDILOL 3.12 MG: 3.12 TABLET, FILM COATED ORAL at 08:40

## 2019-12-21 RX ADMIN — TRAMADOL HYDROCHLORIDE 50 MG: 50 TABLET, FILM COATED ORAL at 08:40

## 2019-12-21 RX ADMIN — SODIUM CHLORIDE, PRESERVATIVE FREE 10 ML: 5 INJECTION INTRAVENOUS at 21:24

## 2019-12-21 RX ADMIN — ACETAMINOPHEN 650 MG: 325 TABLET, FILM COATED ORAL at 15:01

## 2019-12-21 RX ADMIN — TRAMADOL HYDROCHLORIDE 50 MG: 50 TABLET, FILM COATED ORAL at 21:23

## 2019-12-21 RX ADMIN — ACETAMINOPHEN 650 MG: 325 TABLET, FILM COATED ORAL at 02:33

## 2019-12-21 RX ADMIN — CEFTRIAXONE SODIUM 2 G: 2 INJECTION, SOLUTION INTRAVENOUS at 15:02

## 2019-12-22 LAB
ANION GAP SERPL CALCULATED.3IONS-SCNC: 10.8 MMOL/L (ref 5–15)
BUN BLD-MCNC: 9 MG/DL (ref 6–20)
BUN/CREAT SERPL: 12.9 (ref 7–25)
CALCIUM SPEC-SCNC: 8.1 MG/DL (ref 8.6–10.5)
CHLORIDE SERPL-SCNC: 104 MMOL/L (ref 98–107)
CO2 SERPL-SCNC: 25.2 MMOL/L (ref 22–29)
CREAT BLD-MCNC: 0.7 MG/DL (ref 0.76–1.27)
DEPRECATED RDW RBC AUTO: 42.9 FL (ref 37–54)
ERYTHROCYTE [DISTWIDTH] IN BLOOD BY AUTOMATED COUNT: 14.6 % (ref 12.3–15.4)
GFR SERPL CREATININE-BSD FRML MDRD: 140 ML/MIN/1.73
GLUCOSE BLD-MCNC: 114 MG/DL (ref 65–99)
GLUCOSE BLDC GLUCOMTR-MCNC: 109 MG/DL (ref 70–130)
GLUCOSE BLDC GLUCOMTR-MCNC: 113 MG/DL (ref 70–130)
GLUCOSE BLDC GLUCOMTR-MCNC: 121 MG/DL (ref 70–130)
GLUCOSE BLDC GLUCOMTR-MCNC: 123 MG/DL (ref 70–130)
HCT VFR BLD AUTO: 27.7 % (ref 37.5–51)
HGB BLD-MCNC: 9.4 G/DL (ref 13–17.7)
MCH RBC QN AUTO: 27.5 PG (ref 26.6–33)
MCHC RBC AUTO-ENTMCNC: 33.9 G/DL (ref 31.5–35.7)
MCV RBC AUTO: 81 FL (ref 79–97)
PLATELET # BLD AUTO: 167 10*3/MM3 (ref 140–450)
PMV BLD AUTO: 10.2 FL (ref 6–12)
POTASSIUM BLD-SCNC: 3.9 MMOL/L (ref 3.5–5.2)
RBC # BLD AUTO: 3.42 10*6/MM3 (ref 4.14–5.8)
SODIUM BLD-SCNC: 140 MMOL/L (ref 136–145)
WBC NRBC COR # BLD: 6.01 10*3/MM3 (ref 3.4–10.8)

## 2019-12-22 PROCEDURE — 80048 BASIC METABOLIC PNL TOTAL CA: CPT | Performed by: HOSPITALIST

## 2019-12-22 PROCEDURE — 25010000003 CEFTRIAXONE PER 250 MG: Performed by: INTERNAL MEDICINE

## 2019-12-22 PROCEDURE — 85027 COMPLETE CBC AUTOMATED: CPT | Performed by: HOSPITALIST

## 2019-12-22 PROCEDURE — 82962 GLUCOSE BLOOD TEST: CPT

## 2019-12-22 PROCEDURE — 25010000002 VANCOMYCIN 10 G RECONSTITUTED SOLUTION: Performed by: HOSPITALIST

## 2019-12-22 PROCEDURE — 25010000002 KETOROLAC TROMETHAMINE PER 15 MG: Performed by: HOSPITALIST

## 2019-12-22 RX ORDER — HYDROMORPHONE HYDROCHLORIDE 1 MG/ML
0.5 INJECTION, SOLUTION INTRAMUSCULAR; INTRAVENOUS; SUBCUTANEOUS ONCE
Status: DISCONTINUED | OUTPATIENT
Start: 2019-12-22 | End: 2019-12-23

## 2019-12-22 RX ORDER — LORAZEPAM 2 MG/ML
0.5 INJECTION INTRAMUSCULAR ONCE
Status: DISCONTINUED | OUTPATIENT
Start: 2019-12-22 | End: 2019-12-23

## 2019-12-22 RX ADMIN — TRAMADOL HYDROCHLORIDE 50 MG: 50 TABLET, FILM COATED ORAL at 09:33

## 2019-12-22 RX ADMIN — VANCOMYCIN HYDROCHLORIDE 1750 MG: 10 INJECTION, POWDER, LYOPHILIZED, FOR SOLUTION INTRAVENOUS at 11:35

## 2019-12-22 RX ADMIN — SODIUM CHLORIDE, PRESERVATIVE FREE 10 ML: 5 INJECTION INTRAVENOUS at 21:07

## 2019-12-22 RX ADMIN — SODIUM CHLORIDE, PRESERVATIVE FREE 10 ML: 5 INJECTION INTRAVENOUS at 09:38

## 2019-12-22 RX ADMIN — VANCOMYCIN HYDROCHLORIDE 1750 MG: 10 INJECTION, POWDER, LYOPHILIZED, FOR SOLUTION INTRAVENOUS at 00:08

## 2019-12-22 RX ADMIN — ACETAMINOPHEN 650 MG: 325 TABLET, FILM COATED ORAL at 13:41

## 2019-12-22 RX ADMIN — CEFTRIAXONE SODIUM 2 G: 2 INJECTION, SOLUTION INTRAVENOUS at 15:13

## 2019-12-22 RX ADMIN — VANCOMYCIN HYDROCHLORIDE 1750 MG: 10 INJECTION, POWDER, LYOPHILIZED, FOR SOLUTION INTRAVENOUS at 23:18

## 2019-12-22 RX ADMIN — CARVEDILOL 3.12 MG: 3.12 TABLET, FILM COATED ORAL at 21:07

## 2019-12-22 RX ADMIN — ACETAMINOPHEN 650 MG: 325 TABLET, FILM COATED ORAL at 09:33

## 2019-12-22 RX ADMIN — KETOROLAC TROMETHAMINE 30 MG: 30 INJECTION, SOLUTION INTRAMUSCULAR at 22:50

## 2019-12-22 RX ADMIN — TRAMADOL HYDROCHLORIDE 50 MG: 50 TABLET, FILM COATED ORAL at 03:27

## 2019-12-22 RX ADMIN — KETOROLAC TROMETHAMINE 30 MG: 30 INJECTION, SOLUTION INTRAMUSCULAR at 09:33

## 2019-12-22 RX ADMIN — PRAVASTATIN SODIUM 40 MG: 40 TABLET ORAL at 09:33

## 2019-12-22 RX ADMIN — KETOROLAC TROMETHAMINE 30 MG: 30 INJECTION, SOLUTION INTRAMUSCULAR at 03:27

## 2019-12-22 RX ADMIN — CARVEDILOL 3.12 MG: 3.12 TABLET, FILM COATED ORAL at 09:33

## 2019-12-22 RX ADMIN — KETOROLAC TROMETHAMINE 30 MG: 30 INJECTION, SOLUTION INTRAMUSCULAR at 15:53

## 2019-12-23 ENCOUNTER — APPOINTMENT (OUTPATIENT)
Dept: MRI IMAGING | Facility: HOSPITAL | Age: 59
End: 2019-12-23

## 2019-12-23 LAB
ANION GAP SERPL CALCULATED.3IONS-SCNC: 10.2 MMOL/L (ref 5–15)
BUN BLD-MCNC: 10 MG/DL (ref 6–20)
BUN/CREAT SERPL: 13 (ref 7–25)
CALCIUM SPEC-SCNC: 8.6 MG/DL (ref 8.6–10.5)
CHLORIDE SERPL-SCNC: 103 MMOL/L (ref 98–107)
CO2 SERPL-SCNC: 25.8 MMOL/L (ref 22–29)
CREAT BLD-MCNC: 0.77 MG/DL (ref 0.76–1.27)
CRP SERPL-MCNC: 1.85 MG/DL (ref 0–0.5)
DEPRECATED RDW RBC AUTO: 43.5 FL (ref 37–54)
ERYTHROCYTE [DISTWIDTH] IN BLOOD BY AUTOMATED COUNT: 14.6 % (ref 12.3–15.4)
ERYTHROCYTE [SEDIMENTATION RATE] IN BLOOD: 97 MM/HR (ref 0–20)
GFR SERPL CREATININE-BSD FRML MDRD: 125 ML/MIN/1.73
GLUCOSE BLD-MCNC: 103 MG/DL (ref 65–99)
GLUCOSE BLDC GLUCOMTR-MCNC: 100 MG/DL (ref 70–130)
GLUCOSE BLDC GLUCOMTR-MCNC: 110 MG/DL (ref 70–130)
GLUCOSE BLDC GLUCOMTR-MCNC: 115 MG/DL (ref 70–130)
GLUCOSE BLDC GLUCOMTR-MCNC: 147 MG/DL (ref 70–130)
HCT VFR BLD AUTO: 28.9 % (ref 37.5–51)
HGB BLD-MCNC: 9.8 G/DL (ref 13–17.7)
MCH RBC QN AUTO: 28.2 PG (ref 26.6–33)
MCHC RBC AUTO-ENTMCNC: 33.9 G/DL (ref 31.5–35.7)
MCV RBC AUTO: 83 FL (ref 79–97)
PLATELET # BLD AUTO: 188 10*3/MM3 (ref 140–450)
PMV BLD AUTO: 10.2 FL (ref 6–12)
POTASSIUM BLD-SCNC: 4 MMOL/L (ref 3.5–5.2)
RBC # BLD AUTO: 3.48 10*6/MM3 (ref 4.14–5.8)
SODIUM BLD-SCNC: 139 MMOL/L (ref 136–145)
VANCOMYCIN TROUGH SERPL-MCNC: 20.4 MCG/ML (ref 5–20)
WBC NRBC COR # BLD: 4.66 10*3/MM3 (ref 3.4–10.8)

## 2019-12-23 PROCEDURE — 25010000003 CEFTRIAXONE PER 250 MG: Performed by: INTERNAL MEDICINE

## 2019-12-23 PROCEDURE — 73220 MRI UPPR EXTREMITY W/O&W/DYE: CPT

## 2019-12-23 PROCEDURE — 86140 C-REACTIVE PROTEIN: CPT | Performed by: HOSPITALIST

## 2019-12-23 PROCEDURE — 80048 BASIC METABOLIC PNL TOTAL CA: CPT | Performed by: HOSPITALIST

## 2019-12-23 PROCEDURE — 99232 SBSQ HOSP IP/OBS MODERATE 35: CPT | Performed by: INTERNAL MEDICINE

## 2019-12-23 PROCEDURE — 80202 ASSAY OF VANCOMYCIN: CPT | Performed by: HOSPITALIST

## 2019-12-23 PROCEDURE — 0 GADOBENATE DIMEGLUMINE 529 MG/ML SOLUTION: Performed by: HOSPITALIST

## 2019-12-23 PROCEDURE — 25010000002 VANCOMYCIN 10 G RECONSTITUTED SOLUTION: Performed by: HOSPITALIST

## 2019-12-23 PROCEDURE — 85027 COMPLETE CBC AUTOMATED: CPT | Performed by: HOSPITALIST

## 2019-12-23 PROCEDURE — A9577 INJ MULTIHANCE: HCPCS | Performed by: HOSPITALIST

## 2019-12-23 PROCEDURE — 85652 RBC SED RATE AUTOMATED: CPT | Performed by: HOSPITALIST

## 2019-12-23 PROCEDURE — 25010000002 HYDROMORPHONE PER 4 MG: Performed by: HOSPITALIST

## 2019-12-23 PROCEDURE — 25010000002 LORAZEPAM PER 2 MG: Performed by: HOSPITALIST

## 2019-12-23 PROCEDURE — 82962 GLUCOSE BLOOD TEST: CPT

## 2019-12-23 PROCEDURE — 25010000002 KETOROLAC TROMETHAMINE PER 15 MG: Performed by: HOSPITALIST

## 2019-12-23 RX ORDER — LORAZEPAM 2 MG/ML
0.5 INJECTION INTRAMUSCULAR ONCE
Status: COMPLETED | OUTPATIENT
Start: 2019-12-23 | End: 2019-12-23

## 2019-12-23 RX ORDER — HYDROMORPHONE HYDROCHLORIDE 1 MG/ML
0.5 INJECTION, SOLUTION INTRAMUSCULAR; INTRAVENOUS; SUBCUTANEOUS ONCE
Status: COMPLETED | OUTPATIENT
Start: 2019-12-23 | End: 2019-12-23

## 2019-12-23 RX ADMIN — CEFTRIAXONE SODIUM 2 G: 2 INJECTION, SOLUTION INTRAVENOUS at 16:22

## 2019-12-23 RX ADMIN — PRAVASTATIN SODIUM 40 MG: 40 TABLET ORAL at 08:41

## 2019-12-23 RX ADMIN — CARVEDILOL 3.12 MG: 3.12 TABLET, FILM COATED ORAL at 22:16

## 2019-12-23 RX ADMIN — SODIUM CHLORIDE, PRESERVATIVE FREE 10 ML: 5 INJECTION INTRAVENOUS at 08:41

## 2019-12-23 RX ADMIN — LORAZEPAM 0.5 MG: 2 INJECTION INTRAMUSCULAR; INTRAVENOUS at 19:49

## 2019-12-23 RX ADMIN — VANCOMYCIN HYDROCHLORIDE 1500 MG: 10 INJECTION, POWDER, LYOPHILIZED, FOR SOLUTION INTRAVENOUS at 18:16

## 2019-12-23 RX ADMIN — KETOROLAC TROMETHAMINE 30 MG: 30 INJECTION, SOLUTION INTRAMUSCULAR at 18:16

## 2019-12-23 RX ADMIN — KETOROLAC TROMETHAMINE 30 MG: 30 INJECTION, SOLUTION INTRAMUSCULAR at 10:59

## 2019-12-23 RX ADMIN — TRAMADOL HYDROCHLORIDE 50 MG: 50 TABLET, FILM COATED ORAL at 01:31

## 2019-12-23 RX ADMIN — KETOROLAC TROMETHAMINE 30 MG: 30 INJECTION, SOLUTION INTRAMUSCULAR at 05:05

## 2019-12-23 RX ADMIN — GADOBENATE DIMEGLUMINE 20 ML: 529 INJECTION, SOLUTION INTRAVENOUS at 21:06

## 2019-12-23 RX ADMIN — TRAMADOL HYDROCHLORIDE 50 MG: 50 TABLET, FILM COATED ORAL at 13:06

## 2019-12-23 RX ADMIN — TRAMADOL HYDROCHLORIDE 50 MG: 50 TABLET, FILM COATED ORAL at 07:37

## 2019-12-23 RX ADMIN — CARVEDILOL 3.12 MG: 3.12 TABLET, FILM COATED ORAL at 08:41

## 2019-12-23 RX ADMIN — HYDROMORPHONE HYDROCHLORIDE 0.5 MG: 1 INJECTION, SOLUTION INTRAMUSCULAR; INTRAVENOUS; SUBCUTANEOUS at 19:49

## 2019-12-24 LAB
ANION GAP SERPL CALCULATED.3IONS-SCNC: 10.5 MMOL/L (ref 5–15)
BACTERIA SPEC AEROBE CULT: NORMAL
BACTERIA SPEC AEROBE CULT: NORMAL
BUN BLD-MCNC: 14 MG/DL (ref 6–20)
BUN/CREAT SERPL: 19.7 (ref 7–25)
CALCIUM SPEC-SCNC: 8.2 MG/DL (ref 8.6–10.5)
CHLORIDE SERPL-SCNC: 104 MMOL/L (ref 98–107)
CO2 SERPL-SCNC: 24.5 MMOL/L (ref 22–29)
CREAT BLD-MCNC: 0.71 MG/DL (ref 0.76–1.27)
DEPRECATED RDW RBC AUTO: 43 FL (ref 37–54)
ERYTHROCYTE [DISTWIDTH] IN BLOOD BY AUTOMATED COUNT: 14.4 % (ref 12.3–15.4)
GFR SERPL CREATININE-BSD FRML MDRD: 138 ML/MIN/1.73
GLUCOSE BLD-MCNC: 122 MG/DL (ref 65–99)
GLUCOSE BLDC GLUCOMTR-MCNC: 113 MG/DL (ref 70–130)
GLUCOSE BLDC GLUCOMTR-MCNC: 122 MG/DL (ref 70–130)
GLUCOSE BLDC GLUCOMTR-MCNC: 92 MG/DL (ref 70–130)
GLUCOSE BLDC GLUCOMTR-MCNC: 96 MG/DL (ref 70–130)
HCT VFR BLD AUTO: 28.2 % (ref 37.5–51)
HGB BLD-MCNC: 9.3 G/DL (ref 13–17.7)
MCH RBC QN AUTO: 27.4 PG (ref 26.6–33)
MCHC RBC AUTO-ENTMCNC: 33 G/DL (ref 31.5–35.7)
MCV RBC AUTO: 82.9 FL (ref 79–97)
PLATELET # BLD AUTO: 197 10*3/MM3 (ref 140–450)
PMV BLD AUTO: 10.5 FL (ref 6–12)
POTASSIUM BLD-SCNC: 3.8 MMOL/L (ref 3.5–5.2)
RBC # BLD AUTO: 3.4 10*6/MM3 (ref 4.14–5.8)
SODIUM BLD-SCNC: 139 MMOL/L (ref 136–145)
WBC NRBC COR # BLD: 4.05 10*3/MM3 (ref 3.4–10.8)

## 2019-12-24 PROCEDURE — 82962 GLUCOSE BLOOD TEST: CPT

## 2019-12-24 PROCEDURE — 25010000002 VANCOMYCIN 10 G RECONSTITUTED SOLUTION: Performed by: HOSPITALIST

## 2019-12-24 PROCEDURE — 99232 SBSQ HOSP IP/OBS MODERATE 35: CPT | Performed by: INTERNAL MEDICINE

## 2019-12-24 PROCEDURE — 25010000002 KETOROLAC TROMETHAMINE PER 15 MG: Performed by: HOSPITALIST

## 2019-12-24 PROCEDURE — 85027 COMPLETE CBC AUTOMATED: CPT | Performed by: HOSPITALIST

## 2019-12-24 PROCEDURE — 80048 BASIC METABOLIC PNL TOTAL CA: CPT | Performed by: HOSPITALIST

## 2019-12-24 RX ORDER — IBUPROFEN 600 MG/1
600 TABLET ORAL EVERY 6 HOURS PRN
Status: DISCONTINUED | OUTPATIENT
Start: 2019-12-24 | End: 2020-01-01 | Stop reason: HOSPADM

## 2019-12-24 RX ORDER — LINEZOLID 600 MG/1
600 TABLET, FILM COATED ORAL EVERY 12 HOURS SCHEDULED
Status: DISCONTINUED | OUTPATIENT
Start: 2019-12-24 | End: 2020-01-01 | Stop reason: HOSPADM

## 2019-12-24 RX ADMIN — ACETAMINOPHEN 650 MG: 325 TABLET, FILM COATED ORAL at 13:40

## 2019-12-24 RX ADMIN — LINEZOLID 600 MG: 600 TABLET, FILM COATED ORAL at 21:32

## 2019-12-24 RX ADMIN — CARVEDILOL 3.12 MG: 3.12 TABLET, FILM COATED ORAL at 09:43

## 2019-12-24 RX ADMIN — VANCOMYCIN HYDROCHLORIDE 1500 MG: 10 INJECTION, POWDER, LYOPHILIZED, FOR SOLUTION INTRAVENOUS at 03:11

## 2019-12-24 RX ADMIN — TRAMADOL HYDROCHLORIDE 50 MG: 50 TABLET, FILM COATED ORAL at 16:22

## 2019-12-24 RX ADMIN — CARVEDILOL 3.12 MG: 3.12 TABLET, FILM COATED ORAL at 21:32

## 2019-12-24 RX ADMIN — SODIUM CHLORIDE, PRESERVATIVE FREE 10 ML: 5 INJECTION INTRAVENOUS at 09:44

## 2019-12-24 RX ADMIN — TRAMADOL HYDROCHLORIDE 50 MG: 50 TABLET, FILM COATED ORAL at 22:57

## 2019-12-24 RX ADMIN — KETOROLAC TROMETHAMINE 30 MG: 30 INJECTION, SOLUTION INTRAMUSCULAR at 01:14

## 2019-12-24 RX ADMIN — PRAVASTATIN SODIUM 40 MG: 40 TABLET ORAL at 09:44

## 2019-12-24 RX ADMIN — TRAMADOL HYDROCHLORIDE 50 MG: 50 TABLET, FILM COATED ORAL at 09:58

## 2019-12-25 LAB
ALBUMIN SERPL-MCNC: 3.9 G/DL (ref 3.5–5.2)
ALBUMIN/GLOB SERPL: 0.8 G/DL
ALP SERPL-CCNC: 126 U/L (ref 39–117)
ALT SERPL W P-5'-P-CCNC: 20 U/L (ref 1–41)
ANION GAP SERPL CALCULATED.3IONS-SCNC: 12.2 MMOL/L (ref 5–15)
AST SERPL-CCNC: 24 U/L (ref 1–40)
BILIRUB SERPL-MCNC: 0.3 MG/DL (ref 0.2–1.2)
BUN BLD-MCNC: 17 MG/DL (ref 6–20)
BUN/CREAT SERPL: 16.8 (ref 7–25)
CALCIUM SPEC-SCNC: 9 MG/DL (ref 8.6–10.5)
CHLORIDE SERPL-SCNC: 103 MMOL/L (ref 98–107)
CO2 SERPL-SCNC: 24.8 MMOL/L (ref 22–29)
CREAT BLD-MCNC: 1.01 MG/DL (ref 0.76–1.27)
CRP SERPL-MCNC: 0.86 MG/DL (ref 0–0.5)
DEPRECATED RDW RBC AUTO: 43.7 FL (ref 37–54)
ERYTHROCYTE [DISTWIDTH] IN BLOOD BY AUTOMATED COUNT: 14.7 % (ref 12.3–15.4)
ERYTHROCYTE [SEDIMENTATION RATE] IN BLOOD: 65 MM/HR (ref 0–20)
GFR SERPL CREATININE-BSD FRML MDRD: 92 ML/MIN/1.73
GLOBULIN UR ELPH-MCNC: 5 GM/DL
GLUCOSE BLD-MCNC: 94 MG/DL (ref 65–99)
GLUCOSE BLDC GLUCOMTR-MCNC: 103 MG/DL (ref 70–130)
GLUCOSE BLDC GLUCOMTR-MCNC: 108 MG/DL (ref 70–130)
GLUCOSE BLDC GLUCOMTR-MCNC: 108 MG/DL (ref 70–130)
GLUCOSE BLDC GLUCOMTR-MCNC: 111 MG/DL (ref 70–130)
GLUCOSE BLDC GLUCOMTR-MCNC: 138 MG/DL (ref 70–130)
HCT VFR BLD AUTO: 30.9 % (ref 37.5–51)
HGB BLD-MCNC: 10.3 G/DL (ref 13–17.7)
MCH RBC QN AUTO: 27.4 PG (ref 26.6–33)
MCHC RBC AUTO-ENTMCNC: 33.3 G/DL (ref 31.5–35.7)
MCV RBC AUTO: 82.2 FL (ref 79–97)
PLATELET # BLD AUTO: 258 10*3/MM3 (ref 140–450)
PMV BLD AUTO: 10 FL (ref 6–12)
POTASSIUM BLD-SCNC: 4.7 MMOL/L (ref 3.5–5.2)
PROT SERPL-MCNC: 8.9 G/DL (ref 6–8.5)
RBC # BLD AUTO: 3.76 10*6/MM3 (ref 4.14–5.8)
SODIUM BLD-SCNC: 140 MMOL/L (ref 136–145)
WBC NRBC COR # BLD: 5.74 10*3/MM3 (ref 3.4–10.8)

## 2019-12-25 PROCEDURE — 85027 COMPLETE CBC AUTOMATED: CPT | Performed by: HOSPITALIST

## 2019-12-25 PROCEDURE — 82962 GLUCOSE BLOOD TEST: CPT

## 2019-12-25 PROCEDURE — 86140 C-REACTIVE PROTEIN: CPT | Performed by: HOSPITALIST

## 2019-12-25 PROCEDURE — 85652 RBC SED RATE AUTOMATED: CPT | Performed by: HOSPITALIST

## 2019-12-25 PROCEDURE — 80053 COMPREHEN METABOLIC PANEL: CPT | Performed by: HOSPITALIST

## 2019-12-25 RX ADMIN — TRAMADOL HYDROCHLORIDE 50 MG: 50 TABLET, FILM COATED ORAL at 23:13

## 2019-12-25 RX ADMIN — TRAMADOL HYDROCHLORIDE 50 MG: 50 TABLET, FILM COATED ORAL at 06:20

## 2019-12-25 RX ADMIN — LINEZOLID 600 MG: 600 TABLET, FILM COATED ORAL at 08:48

## 2019-12-25 RX ADMIN — LINEZOLID 600 MG: 600 TABLET, FILM COATED ORAL at 21:06

## 2019-12-25 RX ADMIN — ACETAMINOPHEN 650 MG: 325 TABLET, FILM COATED ORAL at 23:13

## 2019-12-25 RX ADMIN — IBUPROFEN 600 MG: 600 TABLET ORAL at 21:06

## 2019-12-25 RX ADMIN — CARVEDILOL 3.12 MG: 3.12 TABLET, FILM COATED ORAL at 08:49

## 2019-12-25 RX ADMIN — PRAVASTATIN SODIUM 40 MG: 40 TABLET ORAL at 08:48

## 2019-12-25 RX ADMIN — CARVEDILOL 3.12 MG: 3.12 TABLET, FILM COATED ORAL at 21:06

## 2019-12-25 RX ADMIN — TRAMADOL HYDROCHLORIDE 50 MG: 50 TABLET, FILM COATED ORAL at 17:17

## 2019-12-26 LAB
ANION GAP SERPL CALCULATED.3IONS-SCNC: 13.4 MMOL/L (ref 5–15)
BUN BLD-MCNC: 12 MG/DL (ref 6–20)
BUN/CREAT SERPL: 16.2 (ref 7–25)
CALCIUM SPEC-SCNC: 8.5 MG/DL (ref 8.6–10.5)
CHLORIDE SERPL-SCNC: 100 MMOL/L (ref 98–107)
CO2 SERPL-SCNC: 24.6 MMOL/L (ref 22–29)
CREAT BLD-MCNC: 0.74 MG/DL (ref 0.76–1.27)
GFR SERPL CREATININE-BSD FRML MDRD: 131 ML/MIN/1.73
GLUCOSE BLD-MCNC: 71 MG/DL (ref 65–99)
GLUCOSE BLDC GLUCOMTR-MCNC: 104 MG/DL (ref 70–130)
GLUCOSE BLDC GLUCOMTR-MCNC: 105 MG/DL (ref 70–130)
GLUCOSE BLDC GLUCOMTR-MCNC: 114 MG/DL (ref 70–130)
GLUCOSE BLDC GLUCOMTR-MCNC: 66 MG/DL (ref 70–130)
POTASSIUM BLD-SCNC: 4.3 MMOL/L (ref 3.5–5.2)
SODIUM BLD-SCNC: 138 MMOL/L (ref 136–145)

## 2019-12-26 PROCEDURE — 82962 GLUCOSE BLOOD TEST: CPT

## 2019-12-26 PROCEDURE — 80048 BASIC METABOLIC PNL TOTAL CA: CPT | Performed by: HOSPITALIST

## 2019-12-26 RX ADMIN — IBUPROFEN 600 MG: 600 TABLET ORAL at 03:29

## 2019-12-26 RX ADMIN — LINEZOLID 600 MG: 600 TABLET, FILM COATED ORAL at 10:50

## 2019-12-26 RX ADMIN — TRAMADOL HYDROCHLORIDE 50 MG: 50 TABLET, FILM COATED ORAL at 21:34

## 2019-12-26 RX ADMIN — TRAMADOL HYDROCHLORIDE 50 MG: 50 TABLET, FILM COATED ORAL at 05:49

## 2019-12-26 RX ADMIN — PRAVASTATIN SODIUM 40 MG: 40 TABLET ORAL at 10:49

## 2019-12-26 RX ADMIN — ACETAMINOPHEN 650 MG: 325 TABLET, FILM COATED ORAL at 10:50

## 2019-12-26 RX ADMIN — CARVEDILOL 3.12 MG: 3.12 TABLET, FILM COATED ORAL at 21:34

## 2019-12-26 RX ADMIN — ACETAMINOPHEN 650 MG: 325 TABLET, FILM COATED ORAL at 05:49

## 2019-12-26 RX ADMIN — TRAMADOL HYDROCHLORIDE 50 MG: 50 TABLET, FILM COATED ORAL at 15:27

## 2019-12-26 RX ADMIN — LINEZOLID 600 MG: 600 TABLET, FILM COATED ORAL at 21:35

## 2019-12-26 RX ADMIN — CARVEDILOL 3.12 MG: 3.12 TABLET, FILM COATED ORAL at 10:49

## 2019-12-27 ENCOUNTER — APPOINTMENT (OUTPATIENT)
Dept: GENERAL RADIOLOGY | Facility: HOSPITAL | Age: 59
End: 2019-12-27

## 2019-12-27 LAB
ANION GAP SERPL CALCULATED.3IONS-SCNC: 9.7 MMOL/L (ref 5–15)
BASOPHILS # BLD MANUAL: 0.03 10*3/MM3 (ref 0–0.2)
BASOPHILS NFR BLD AUTO: 1 % (ref 0–1.5)
BUN BLD-MCNC: 12 MG/DL (ref 6–20)
BUN/CREAT SERPL: 14.5 (ref 7–25)
CALCIUM SPEC-SCNC: 8.3 MG/DL (ref 8.6–10.5)
CHLORIDE SERPL-SCNC: 96 MMOL/L (ref 98–107)
CO2 SERPL-SCNC: 26.3 MMOL/L (ref 22–29)
CREAT BLD-MCNC: 0.83 MG/DL (ref 0.76–1.27)
DEPRECATED RDW RBC AUTO: 43.4 FL (ref 37–54)
ERYTHROCYTE [DISTWIDTH] IN BLOOD BY AUTOMATED COUNT: 14.5 % (ref 12.3–15.4)
GFR SERPL CREATININE-BSD FRML MDRD: 115 ML/MIN/1.73
GLUCOSE BLD-MCNC: 106 MG/DL (ref 65–99)
GLUCOSE BLDC GLUCOMTR-MCNC: 105 MG/DL (ref 70–130)
GLUCOSE BLDC GLUCOMTR-MCNC: 127 MG/DL (ref 70–130)
GLUCOSE BLDC GLUCOMTR-MCNC: 133 MG/DL (ref 70–130)
GLUCOSE BLDC GLUCOMTR-MCNC: 92 MG/DL (ref 70–130)
HCT VFR BLD AUTO: 28.3 % (ref 37.5–51)
HGB BLD-MCNC: 9.7 G/DL (ref 13–17.7)
LYMPHOCYTES # BLD MANUAL: 2.34 10*3/MM3 (ref 0.7–3.1)
LYMPHOCYTES NFR BLD MANUAL: 5.1 % (ref 5–12)
LYMPHOCYTES NFR BLD MANUAL: 71.4 % (ref 19.6–45.3)
MCH RBC QN AUTO: 28.4 PG (ref 26.6–33)
MCHC RBC AUTO-ENTMCNC: 34.3 G/DL (ref 31.5–35.7)
MCV RBC AUTO: 82.7 FL (ref 79–97)
MONOCYTES # BLD AUTO: 0.17 10*3/MM3 (ref 0.1–0.9)
NEUTROPHILS # BLD AUTO: 0.73 10*3/MM3 (ref 1.7–7)
NEUTROPHILS NFR BLD MANUAL: 22.4 % (ref 42.7–76)
PLAT MORPH BLD: NORMAL
PLATELET # BLD AUTO: 215 10*3/MM3 (ref 140–450)
PMV BLD AUTO: 9.7 FL (ref 6–12)
POTASSIUM BLD-SCNC: 3.7 MMOL/L (ref 3.5–5.2)
RBC # BLD AUTO: 3.42 10*6/MM3 (ref 4.14–5.8)
RBC MORPH BLD: NORMAL
SODIUM BLD-SCNC: 132 MMOL/L (ref 136–145)
WBC MORPH BLD: NORMAL
WBC NRBC COR # BLD: 3.28 10*3/MM3 (ref 3.4–10.8)

## 2019-12-27 PROCEDURE — 85025 COMPLETE CBC W/AUTO DIFF WBC: CPT | Performed by: INTERNAL MEDICINE

## 2019-12-27 PROCEDURE — 73130 X-RAY EXAM OF HAND: CPT

## 2019-12-27 PROCEDURE — 80048 BASIC METABOLIC PNL TOTAL CA: CPT | Performed by: INTERNAL MEDICINE

## 2019-12-27 PROCEDURE — 85007 BL SMEAR W/DIFF WBC COUNT: CPT | Performed by: INTERNAL MEDICINE

## 2019-12-27 PROCEDURE — 82962 GLUCOSE BLOOD TEST: CPT

## 2019-12-27 RX ADMIN — PRAVASTATIN SODIUM 40 MG: 40 TABLET ORAL at 08:15

## 2019-12-27 RX ADMIN — TRAMADOL HYDROCHLORIDE 50 MG: 50 TABLET, FILM COATED ORAL at 04:54

## 2019-12-27 RX ADMIN — CARVEDILOL 3.12 MG: 3.12 TABLET, FILM COATED ORAL at 19:54

## 2019-12-27 RX ADMIN — IBUPROFEN 600 MG: 600 TABLET ORAL at 08:18

## 2019-12-27 RX ADMIN — TRAMADOL HYDROCHLORIDE 50 MG: 50 TABLET, FILM COATED ORAL at 12:25

## 2019-12-27 RX ADMIN — LINEZOLID 600 MG: 600 TABLET, FILM COATED ORAL at 08:15

## 2019-12-27 RX ADMIN — LINEZOLID 600 MG: 600 TABLET, FILM COATED ORAL at 19:54

## 2019-12-27 RX ADMIN — CARVEDILOL 3.12 MG: 3.12 TABLET, FILM COATED ORAL at 08:15

## 2019-12-27 RX ADMIN — TRAMADOL HYDROCHLORIDE 50 MG: 50 TABLET, FILM COATED ORAL at 20:01

## 2019-12-28 LAB
GLUCOSE BLDC GLUCOMTR-MCNC: 105 MG/DL (ref 70–130)
GLUCOSE BLDC GLUCOMTR-MCNC: 108 MG/DL (ref 70–130)
GLUCOSE BLDC GLUCOMTR-MCNC: 116 MG/DL (ref 70–130)
GLUCOSE BLDC GLUCOMTR-MCNC: 86 MG/DL (ref 70–130)

## 2019-12-28 PROCEDURE — 25010000002 MORPHINE PER 10 MG: Performed by: INTERNAL MEDICINE

## 2019-12-28 PROCEDURE — 82962 GLUCOSE BLOOD TEST: CPT

## 2019-12-28 RX ORDER — MORPHINE SULFATE 2 MG/ML
2 INJECTION, SOLUTION INTRAMUSCULAR; INTRAVENOUS EVERY 4 HOURS PRN
Status: DISCONTINUED | OUTPATIENT
Start: 2019-12-28 | End: 2020-01-01 | Stop reason: HOSPADM

## 2019-12-28 RX ADMIN — ACETAMINOPHEN 650 MG: 325 TABLET, FILM COATED ORAL at 21:53

## 2019-12-28 RX ADMIN — MORPHINE SULFATE 2 MG: 2 INJECTION, SOLUTION INTRAMUSCULAR; INTRAVENOUS at 16:43

## 2019-12-28 RX ADMIN — ACETAMINOPHEN 650 MG: 325 TABLET, FILM COATED ORAL at 16:43

## 2019-12-28 RX ADMIN — CARVEDILOL 3.12 MG: 3.12 TABLET, FILM COATED ORAL at 09:28

## 2019-12-28 RX ADMIN — LINEZOLID 600 MG: 600 TABLET, FILM COATED ORAL at 09:28

## 2019-12-28 RX ADMIN — TRAMADOL HYDROCHLORIDE 50 MG: 50 TABLET, FILM COATED ORAL at 06:26

## 2019-12-28 RX ADMIN — ACETAMINOPHEN 650 MG: 325 TABLET, FILM COATED ORAL at 09:28

## 2019-12-28 RX ADMIN — TRAMADOL HYDROCHLORIDE 50 MG: 50 TABLET, FILM COATED ORAL at 12:01

## 2019-12-28 RX ADMIN — IBUPROFEN 600 MG: 600 TABLET ORAL at 10:27

## 2019-12-28 RX ADMIN — HYDROXYZINE HYDROCHLORIDE 50 MG: 50 TABLET ORAL at 10:27

## 2019-12-28 RX ADMIN — CARVEDILOL 3.12 MG: 3.12 TABLET, FILM COATED ORAL at 21:53

## 2019-12-28 RX ADMIN — MORPHINE SULFATE 2 MG: 2 INJECTION, SOLUTION INTRAMUSCULAR; INTRAVENOUS at 22:03

## 2019-12-28 RX ADMIN — TRAMADOL HYDROCHLORIDE 50 MG: 50 TABLET, FILM COATED ORAL at 18:04

## 2019-12-28 RX ADMIN — PRAVASTATIN SODIUM 40 MG: 40 TABLET ORAL at 09:28

## 2019-12-28 RX ADMIN — MORPHINE SULFATE 2 MG: 2 INJECTION, SOLUTION INTRAMUSCULAR; INTRAVENOUS at 12:39

## 2019-12-28 RX ADMIN — LINEZOLID 600 MG: 600 TABLET, FILM COATED ORAL at 21:53

## 2019-12-29 LAB
GLUCOSE BLDC GLUCOMTR-MCNC: 100 MG/DL (ref 70–130)
GLUCOSE BLDC GLUCOMTR-MCNC: 122 MG/DL (ref 70–130)
GLUCOSE BLDC GLUCOMTR-MCNC: 163 MG/DL (ref 70–130)
GLUCOSE BLDC GLUCOMTR-MCNC: 89 MG/DL (ref 70–130)

## 2019-12-29 PROCEDURE — 82962 GLUCOSE BLOOD TEST: CPT

## 2019-12-29 PROCEDURE — 25010000002 MORPHINE PER 10 MG: Performed by: INTERNAL MEDICINE

## 2019-12-29 RX ADMIN — IBUPROFEN 600 MG: 600 TABLET ORAL at 10:21

## 2019-12-29 RX ADMIN — MORPHINE SULFATE 2 MG: 2 INJECTION, SOLUTION INTRAMUSCULAR; INTRAVENOUS at 08:09

## 2019-12-29 RX ADMIN — TRAMADOL HYDROCHLORIDE 50 MG: 50 TABLET, FILM COATED ORAL at 18:45

## 2019-12-29 RX ADMIN — TRAMADOL HYDROCHLORIDE 50 MG: 50 TABLET, FILM COATED ORAL at 10:16

## 2019-12-29 RX ADMIN — TRAMADOL HYDROCHLORIDE 50 MG: 50 TABLET, FILM COATED ORAL at 01:57

## 2019-12-29 RX ADMIN — LINEZOLID 600 MG: 600 TABLET, FILM COATED ORAL at 22:00

## 2019-12-29 RX ADMIN — PRAVASTATIN SODIUM 40 MG: 40 TABLET ORAL at 08:09

## 2019-12-29 RX ADMIN — MORPHINE SULFATE 2 MG: 2 INJECTION, SOLUTION INTRAMUSCULAR; INTRAVENOUS at 23:47

## 2019-12-29 RX ADMIN — MORPHINE SULFATE 2 MG: 2 INJECTION, SOLUTION INTRAMUSCULAR; INTRAVENOUS at 03:42

## 2019-12-29 RX ADMIN — CARVEDILOL 3.12 MG: 3.12 TABLET, FILM COATED ORAL at 22:00

## 2019-12-29 RX ADMIN — LINEZOLID 600 MG: 600 TABLET, FILM COATED ORAL at 08:09

## 2019-12-29 RX ADMIN — MORPHINE SULFATE 2 MG: 2 INJECTION, SOLUTION INTRAMUSCULAR; INTRAVENOUS at 15:13

## 2019-12-29 RX ADMIN — CARVEDILOL 3.12 MG: 3.12 TABLET, FILM COATED ORAL at 08:09

## 2019-12-30 LAB
GLUCOSE BLDC GLUCOMTR-MCNC: 110 MG/DL (ref 70–130)
GLUCOSE BLDC GLUCOMTR-MCNC: 167 MG/DL (ref 70–130)
GLUCOSE BLDC GLUCOMTR-MCNC: 214 MG/DL (ref 70–130)
GLUCOSE BLDC GLUCOMTR-MCNC: 92 MG/DL (ref 70–130)

## 2019-12-30 PROCEDURE — 25010000002 MORPHINE PER 10 MG: Performed by: INTERNAL MEDICINE

## 2019-12-30 PROCEDURE — 63710000001 INSULIN LISPRO (HUMAN) PER 5 UNITS: Performed by: HOSPITALIST

## 2019-12-30 PROCEDURE — 82962 GLUCOSE BLOOD TEST: CPT

## 2019-12-30 RX ADMIN — TRAMADOL HYDROCHLORIDE 50 MG: 50 TABLET, FILM COATED ORAL at 03:19

## 2019-12-30 RX ADMIN — PRAVASTATIN SODIUM 40 MG: 40 TABLET ORAL at 08:13

## 2019-12-30 RX ADMIN — MORPHINE SULFATE 2 MG: 2 INJECTION, SOLUTION INTRAMUSCULAR; INTRAVENOUS at 06:12

## 2019-12-30 RX ADMIN — MORPHINE SULFATE 2 MG: 2 INJECTION, SOLUTION INTRAMUSCULAR; INTRAVENOUS at 12:54

## 2019-12-30 RX ADMIN — INSULIN LISPRO 3 UNITS: 100 INJECTION, SOLUTION INTRAVENOUS; SUBCUTANEOUS at 20:52

## 2019-12-30 RX ADMIN — CARVEDILOL 3.12 MG: 3.12 TABLET, FILM COATED ORAL at 20:53

## 2019-12-30 RX ADMIN — LINEZOLID 600 MG: 600 TABLET, FILM COATED ORAL at 20:53

## 2019-12-30 RX ADMIN — CARVEDILOL 3.12 MG: 3.12 TABLET, FILM COATED ORAL at 08:13

## 2019-12-30 RX ADMIN — INSULIN LISPRO 5 UNITS: 100 INJECTION, SOLUTION INTRAVENOUS; SUBCUTANEOUS at 08:13

## 2019-12-30 RX ADMIN — LINEZOLID 600 MG: 600 TABLET, FILM COATED ORAL at 08:13

## 2019-12-30 RX ADMIN — MORPHINE SULFATE 2 MG: 2 INJECTION, SOLUTION INTRAMUSCULAR; INTRAVENOUS at 17:11

## 2019-12-31 LAB
GLUCOSE BLDC GLUCOMTR-MCNC: 103 MG/DL (ref 70–130)
GLUCOSE BLDC GLUCOMTR-MCNC: 126 MG/DL (ref 70–130)
GLUCOSE BLDC GLUCOMTR-MCNC: 127 MG/DL (ref 70–130)
GLUCOSE BLDC GLUCOMTR-MCNC: 130 MG/DL (ref 70–130)

## 2019-12-31 PROCEDURE — 25010000002 MORPHINE PER 10 MG: Performed by: INTERNAL MEDICINE

## 2019-12-31 PROCEDURE — 82962 GLUCOSE BLOOD TEST: CPT

## 2019-12-31 RX ADMIN — HYDROXYZINE HYDROCHLORIDE 50 MG: 50 TABLET ORAL at 15:48

## 2019-12-31 RX ADMIN — CARVEDILOL 3.12 MG: 3.12 TABLET, FILM COATED ORAL at 15:48

## 2019-12-31 RX ADMIN — MORPHINE SULFATE 2 MG: 2 INJECTION, SOLUTION INTRAMUSCULAR; INTRAVENOUS at 15:48

## 2019-12-31 RX ADMIN — MORPHINE SULFATE 2 MG: 2 INJECTION, SOLUTION INTRAMUSCULAR; INTRAVENOUS at 06:03

## 2019-12-31 RX ADMIN — LINEZOLID 600 MG: 600 TABLET, FILM COATED ORAL at 11:13

## 2019-12-31 RX ADMIN — LINEZOLID 600 MG: 600 TABLET, FILM COATED ORAL at 21:25

## 2019-12-31 RX ADMIN — MORPHINE SULFATE 2 MG: 2 INJECTION, SOLUTION INTRAMUSCULAR; INTRAVENOUS at 11:13

## 2019-12-31 RX ADMIN — MORPHINE SULFATE 2 MG: 2 INJECTION, SOLUTION INTRAMUSCULAR; INTRAVENOUS at 21:25

## 2019-12-31 RX ADMIN — CARVEDILOL 3.12 MG: 3.12 TABLET, FILM COATED ORAL at 21:24

## 2019-12-31 RX ADMIN — PRAVASTATIN SODIUM 40 MG: 40 TABLET ORAL at 11:13

## 2019-12-31 RX ADMIN — MORPHINE SULFATE 2 MG: 2 INJECTION, SOLUTION INTRAMUSCULAR; INTRAVENOUS at 01:27

## 2019-12-31 RX ADMIN — IBUPROFEN 600 MG: 600 TABLET ORAL at 21:24

## 2019-12-31 RX ADMIN — HYDROXYZINE HYDROCHLORIDE 50 MG: 50 TABLET ORAL at 11:13

## 2020-01-01 VITALS
WEIGHT: 204.81 LBS | OXYGEN SATURATION: 97 % | BODY MASS INDEX: 26.28 KG/M2 | HEIGHT: 74 IN | SYSTOLIC BLOOD PRESSURE: 106 MMHG | TEMPERATURE: 97.5 F | HEART RATE: 72 BPM | RESPIRATION RATE: 16 BRPM | DIASTOLIC BLOOD PRESSURE: 60 MMHG

## 2020-01-01 LAB
ANION GAP SERPL CALCULATED.3IONS-SCNC: 14.2 MMOL/L (ref 5–15)
BASOPHILS # BLD AUTO: 0.04 10*3/MM3 (ref 0–0.2)
BASOPHILS NFR BLD AUTO: 0.8 % (ref 0–1.5)
BUN BLD-MCNC: 24 MG/DL (ref 6–20)
BUN/CREAT SERPL: 21.1 (ref 7–25)
CALCIUM SPEC-SCNC: 9 MG/DL (ref 8.6–10.5)
CHLORIDE SERPL-SCNC: 105 MMOL/L (ref 98–107)
CO2 SERPL-SCNC: 21.8 MMOL/L (ref 22–29)
CREAT BLD-MCNC: 1.14 MG/DL (ref 0.76–1.27)
DEPRECATED RDW RBC AUTO: 44.2 FL (ref 37–54)
EOSINOPHIL # BLD AUTO: 0.16 10*3/MM3 (ref 0–0.4)
EOSINOPHIL NFR BLD AUTO: 3 % (ref 0.3–6.2)
ERYTHROCYTE [DISTWIDTH] IN BLOOD BY AUTOMATED COUNT: 14.5 % (ref 12.3–15.4)
GFR SERPL CREATININE-BSD FRML MDRD: 80 ML/MIN/1.73
GLUCOSE BLD-MCNC: 110 MG/DL (ref 65–99)
GLUCOSE BLDC GLUCOMTR-MCNC: 110 MG/DL (ref 70–130)
GLUCOSE BLDC GLUCOMTR-MCNC: 178 MG/DL (ref 70–130)
HCT VFR BLD AUTO: 36 % (ref 37.5–51)
HGB BLD-MCNC: 11.7 G/DL (ref 13–17.7)
IMM GRANULOCYTES # BLD AUTO: 0.02 10*3/MM3 (ref 0–0.05)
IMM GRANULOCYTES NFR BLD AUTO: 0.4 % (ref 0–0.5)
LYMPHOCYTES # BLD AUTO: 1.73 10*3/MM3 (ref 0.7–3.1)
LYMPHOCYTES NFR BLD AUTO: 32.8 % (ref 19.6–45.3)
MCH RBC QN AUTO: 27.5 PG (ref 26.6–33)
MCHC RBC AUTO-ENTMCNC: 32.5 G/DL (ref 31.5–35.7)
MCV RBC AUTO: 84.5 FL (ref 79–97)
MONOCYTES # BLD AUTO: 0.62 10*3/MM3 (ref 0.1–0.9)
MONOCYTES NFR BLD AUTO: 11.7 % (ref 5–12)
NEUTROPHILS # BLD AUTO: 2.71 10*3/MM3 (ref 1.7–7)
NEUTROPHILS NFR BLD AUTO: 51.3 % (ref 42.7–76)
NRBC BLD AUTO-RTO: 0 /100 WBC (ref 0–0.2)
PLATELET # BLD AUTO: 258 10*3/MM3 (ref 140–450)
PMV BLD AUTO: 10 FL (ref 6–12)
POTASSIUM BLD-SCNC: 4.5 MMOL/L (ref 3.5–5.2)
RBC # BLD AUTO: 4.26 10*6/MM3 (ref 4.14–5.8)
SODIUM BLD-SCNC: 141 MMOL/L (ref 136–145)
WBC NRBC COR # BLD: 5.28 10*3/MM3 (ref 3.4–10.8)

## 2020-01-01 PROCEDURE — 87070 CULTURE OTHR SPECIMN AEROBIC: CPT | Performed by: SURGERY

## 2020-01-01 PROCEDURE — 3E0U33Z INTRODUCTION OF ANTI-INFLAMMATORY INTO JOINTS, PERCUTANEOUS APPROACH: ICD-10-PCS | Performed by: SURGERY

## 2020-01-01 PROCEDURE — 87205 SMEAR GRAM STAIN: CPT | Performed by: SURGERY

## 2020-01-01 PROCEDURE — 63710000001 INSULIN LISPRO (HUMAN) PER 5 UNITS: Performed by: HOSPITALIST

## 2020-01-01 PROCEDURE — 87075 CULTR BACTERIA EXCEPT BLOOD: CPT | Performed by: SURGERY

## 2020-01-01 PROCEDURE — 0R9N3ZX DRAINAGE OF RIGHT WRIST JOINT, PERCUTANEOUS APPROACH, DIAGNOSTIC: ICD-10-PCS | Performed by: SURGERY

## 2020-01-01 PROCEDURE — 80048 BASIC METABOLIC PNL TOTAL CA: CPT | Performed by: INTERNAL MEDICINE

## 2020-01-01 PROCEDURE — 87015 SPECIMEN INFECT AGNT CONCNTJ: CPT | Performed by: SURGERY

## 2020-01-01 PROCEDURE — 25010000002 MORPHINE PER 10 MG: Performed by: INTERNAL MEDICINE

## 2020-01-01 PROCEDURE — 85025 COMPLETE CBC W/AUTO DIFF WBC: CPT | Performed by: INTERNAL MEDICINE

## 2020-01-01 PROCEDURE — 82962 GLUCOSE BLOOD TEST: CPT

## 2020-01-01 RX ORDER — CARVEDILOL 3.12 MG/1
3.12 TABLET ORAL 2 TIMES DAILY WITH MEALS
Qty: 60 TABLET | Refills: 0 | Status: SHIPPED | OUTPATIENT
Start: 2020-01-01

## 2020-01-01 RX ORDER — LINEZOLID 600 MG/1
600 TABLET, FILM COATED ORAL EVERY 12 HOURS SCHEDULED
Qty: 10 TABLET | Refills: 0 | Status: SHIPPED | OUTPATIENT
Start: 2020-01-01 | End: 2020-01-06

## 2020-01-01 RX ADMIN — HYDROXYZINE HYDROCHLORIDE 50 MG: 50 TABLET ORAL at 04:26

## 2020-01-01 RX ADMIN — IBUPROFEN 600 MG: 600 TABLET ORAL at 13:35

## 2020-01-01 RX ADMIN — MORPHINE SULFATE 2 MG: 2 INJECTION, SOLUTION INTRAMUSCULAR; INTRAVENOUS at 10:50

## 2020-01-01 RX ADMIN — CARVEDILOL 3.12 MG: 3.12 TABLET, FILM COATED ORAL at 10:49

## 2020-01-01 RX ADMIN — LINEZOLID 600 MG: 600 TABLET, FILM COATED ORAL at 10:49

## 2020-01-01 RX ADMIN — INSULIN LISPRO 3 UNITS: 100 INJECTION, SOLUTION INTRAVENOUS; SUBCUTANEOUS at 11:00

## 2020-01-01 RX ADMIN — PRAVASTATIN SODIUM 40 MG: 40 TABLET ORAL at 10:49

## 2020-01-01 RX ADMIN — MORPHINE SULFATE 2 MG: 2 INJECTION, SOLUTION INTRAMUSCULAR; INTRAVENOUS at 04:25

## 2020-01-01 RX ADMIN — IBUPROFEN 600 MG: 600 TABLET ORAL at 04:26

## 2020-01-01 RX ADMIN — HYDROXYZINE HYDROCHLORIDE 50 MG: 50 TABLET ORAL at 13:35

## 2020-01-01 NOTE — DISCHARGE INSTRUCTIONS
Follow-up with Dr. Nunez in two weeks.  Call to schedule an appointment at:    [facility]  [address] [place] [address],  [address]  [place] [id number] [phone]

## 2020-01-01 NOTE — PROGRESS NOTES
Adult Nutrition  Assessment/PES    Patient Name:  Kwame Andres  YOB: 1960  MRN: [id number]  Admit Date:  12/19/2019    Assessment Date:  1/1/2020    Comments:  Assess/chart reviewed due to LOS >10 days. Pt tolerating diet, intake adequate. Glu controlled. Will cont to monitor.     Reason for Assessment     Row Name 01/01/20 0938          Reason for Assessment    Reason For Assessment  other (see comments) LOS >10 days     Diagnosis  infection/sepsis;diabetes diagnosis/complications;psychosocial  Bacterial skin infection, DM2, HTN, MRSA, PTSD, opiod use disorder         Nutrition/Diet History     Row Name 01/01/20 0939          Nutrition/Diet History    Typical Food/Fluid Intake  Eating fine         Anthropometrics     Row Name 01/01/20 0940          Admit Weight    Admit Weight  92.5 kg (204 lb)        Body Mass Index (BMI)    BMI Assessment  BMI 25-29.9: overweight         Labs/Tests/Procedures/Meds     Row Name 01/01/20 0940          Labs/Procedures/Meds    Lab Results Reviewed  reviewed     Lab Results Comments  Glu wnl, BUN 24        Diagnostic Tests/Procedures    Diagnostic Test/Procedure Reviewed  reviewed     Diagnostic Test/Procedures Comments  MRI & xray of hand: Metal related artifact dorsum of the hand correlates with the needle        Medications    Pertinent Medications Reviewed  reviewed     Pertinent Medications Comments  abx, insulin         Physical Findings     Row Name 01/01/20 0941          Physical Findings    Skin  other (see comments) cellulitis Left dorsal wrist: infectious extensor tenosynovitis           Nutrition Prescription Ordered     Row Name 01/01/20 0942          Nutrition Prescription PO    Common Modifiers  Consistent Carbohydrate         Evaluation of Received Nutrient/Fluid Intake     Row Name 01/01/20 0942          PO Evaluation    % PO Intake  reviewed since adm - generally % + snacks               Problem/Interventions:  Problem 1     Row Name 01/01/20  0945          Nutrition Diagnoses Problem 1    Problem 1  Nutrition Appropriate for Condition at this Time               Intervention Goal     Row Name 01/01/20 0945          Intervention Goal    General  Maintain nutrition     PO  Maintain intake         Nutrition Intervention     Row Name 01/01/20 0945          Nutrition Intervention    RD/Tech Action  Follow Tx progress           Education/Evaluation     Row Name 01/01/20 0945          Education    Education  No discharge needs identified at this time        Monitor/Evaluation    Monitor  Per protocol           Electronically signed by:  Mariam Wakefield RD  01/01/20 9:45 AM

## 2020-01-01 NOTE — DISCHARGE SUMMARY
[facility]    Date of Discharge:  1/1/2020    PCP: Provider, No Known    Discharge Diagnosis:   Active Hospital Problems    Diagnosis  POA   • **Cellulitis of left hand [L03.114]  Yes   • Iron deficiency anemia [D50.9]  Yes   • History of methicillin resistant staphylococcus aureus (MRSA) [Z86.14]  Yes   • PTSD (post-traumatic stress disorder) [F43.10]  Yes   • Opioid use disorder, moderate, in sustained remission (CMS/HCC) [F11.21]  Yes   • Hypertension [I10]  Yes   • Diabetes mellitus (CMS/HCC) [E11.9]  Yes      Resolved Hospital Problems   No resolved problems to display.     Procedures Performed       Consults     Date and Time Order Name Status Description    12/24/2019 0959 Inpatient Hand Surgery Consult Completed     12/19/2019 1001 Inpatient Infectious Diseases Consult Completed     12/19/2019 0824 Inpatient Orthopedic Surgery Consult Completed     12/19/2019 0351 [facility] (on-call MD unless specified) Details Completed         Hospital Course    This is a 59-year-old male with past medical history of diabetes mellitus, chronic pain, cellulitis of wrist/ forearm due to abscesses, hypertension was admitted to the hospital main complaints cellulitis left upper extremity as well as he eventually developed swelling of right upper extremity as well.  Patient was started on antibiotics.  Infectious Disease did manage patient's antibiotics.  Patient was eventually evaluated by hand surgeon.  Patient's right hand swelling was drained.  Steroid injection was also administered into the lesion.  The fluid has been sent for culture.  Patient will closely follow up with hand surgeon on outpatient basis.  He will complete a course of antibiotics on outpatient basis.  I have seen examined patient by the bedside today on the day of discharge and total time spent is more than 30 min.  Plan of care was discussed with the patient and he has verbalized  understanding.    Condition on Discharge: Improved.     Temp:  [98.3 °F (36.8 °C)-98.9 °F (37.2 °C)] 98.3 °F (36.8 °C)  Heart Rate:  [] 84  Resp:  [16-18] 16  BP: (107-134)/(60-76) 115/68  Body mass index is 26.28 kg/m².    Physical Exam   General Appearance:  Comfortable and in no acute distress.    Output: Producing urine.    HEENT: Normal HEENT exam.    Lungs:  Normal effort and normal respiratory rate.  Breath sounds clear to auscultation.  He is not in respiratory distress.    Heart: Normal rate.  Regular rhythm.    Abdomen: Abdomen is soft.  Bowel sounds are normal.   There is no abdominal tenderness.     Extremities: There is no dependent edema.    Pulses: Distal pulses are intact.    Neurological: Patient is alert and oriented to person, place and time.  Normal strength.  Patient has normal muscle tone.    Skin:  Warm and dry.  No rash.        Discharge Medications      New Medications      Instructions Start Date   linezolid 600 MG tablet  Commonly known as:  ZYVOX   600 mg, Oral, Every 12 Hours Scheduled         Changes to Medications      Instructions Start Date   carvedilol 3.125 MG tablet  Commonly known as:  COREG  What changed:    · medication strength  · how much to take  · when to take this   3.125 mg, Oral, 2 Times Daily With Meals         Continue These Medications      Instructions Start Date   aspirin 81 MG chewable tablet   Oral, 2 Times Daily      cloNIDine 0.1 MG tablet  Commonly known as:  CATAPRES   0.1 mg, Oral, 3 Times Daily      DULoxetine 30 MG capsule  Commonly known as:  CYMBALTA   30 mg, Oral, Daily      furosemide 40 MG tablet  Commonly known as:  LASIX   Oral, Daily      hydrOXYzine 50 MG tablet  Commonly known as:  ATARAX   50 mg, Oral, 3 Times Daily PRN      insulin detemir 100 UNIT/ML injection  Commonly known as:  LEVEMIR   20 Units, Subcutaneous, Daily, in am       metFORMIN 500 MG tablet  Commonly known as:  GLUCOPHAGE   Oral, Daily      pravastatin 20 MG  tablet  Commonly known as:  PRAVACHOL   40 mg, Oral, Daily         Stop These Medications    insulin lispro 100 UNIT/ML injection  Commonly known as:  humaLOG             Additional Instructions for the Follow-ups that You Need to Schedule     Discharge Follow-up with PCP   As directed       Currently Documented PCP:    Provider, No Known    PCP Phone Number:    None     Follow Up Details:  Follow up With PCP in 7 days.         Discharge Follow-up with Specified Provider: With Dr. Enrique olsen surgeon; 2 Weeks   As directed      To:  With Dr. Enrique olsen surgeon    Follow Up:  2 Weeks           Follow-up Information     Provider, No Known .    Why:  Follow up With PCP in 7 days.  Contact information:  [facility] [address]                 Test Results Pending at Discharge   Order Current Status    Anaerobic Culture - Body Fluid, Hand, Right In process    Body Fluid Culture - Body Fluid, Hand, Right In process         Mike Bethea MD  01/01/20  2:15 PM    Discharge time spent greater than 30 minutes.

## 2020-01-01 NOTE — PROGRESS NOTES
Discharge Planning Assessment  [facility]     Patient Name: Kwame Andres  MRN: [id number]  Today's Date: 1/1/2020    Admit Date: 12/19/2019    Discharge Needs Assessment    No documentation.       Discharge Plan     Row Name 01/01/20 1807       Plan    Plan Comments  Per RN the patient is being discharged on Zyvox 1 tablet by mouth every 12 hours for 10 doses. Indications infection of the skin and or soft tissue. The patient requested assistance with the medication. Placed call to [facility] and spoke with the assistant and she states that his insurance/Passport termed and they have no insurance listed. Went to speak with the patient to verify if he has insurance and has contacted [employer] and he had left and went home before I could get any additional information. Per [facility] the out of pocket cost would be 1,300.00 dollars. Tried to call patient on his cell and no answer. Notified RN and charge nurse. The patient is to follow up with Dr. Nunez in two weeks regarding the needle aspiration results from today. The patient and his spouse called a taxi and left before all this was completed. LILA Pichardo, RN, CCP.         Destination      Coordination has not been started for this encounter.      Durable Medical Equipment      Coordination has not been started for this encounter.      Dialysis/Infusion      Coordination has not been started for this encounter.      Home Medical Care      Coordination has not been started for this encounter.      Therapy      Coordination has not been started for this encounter.      Community Resources      Coordination has not been started for this encounter.        Expected Discharge Date and Time     Expected Discharge Date Expected Discharge Time    Jan 1, 2020         Demographic Summary    No documentation.       Functional Status    No documentation.       Psychosocial    No documentation.       Abuse/Neglect    No documentation.       Legal    No documentation.        Substance Abuse    No documentation.       Patient Forms    No documentation.           Apryl Pichardo RN

## 2020-01-01 NOTE — NURSING NOTE
1728-Went over discharge paperwork with the pt.  Pt. Said he wanted to talk to Елена Pichardo CCP, regarding the cost of his antibiotics.  I let Елена know.  1745-Went back in the pt.'s room and advised the patient and his sister that Елена was contacting [facility] to see about the cost of his prescriptions.  They agreed to wait.  1805-Went back into the patient's room and he and his sister had already left.  Елена Pichardo will follow-up by calling the patient.

## 2020-01-01 NOTE — PLAN OF CARE
Pt has been up awake much of shift, states he has not slept well for over 4 days now. Pt has been restless with noticible twitches, quick muscle spasms, whistling. 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 Pt continues with c/o pain in left and right wrists/hands. Medicated with PRN Morphine and Ibuprofen twice this shift with relief voiced. Medicated with Atarax x1 for itching/restlessness.  Pt has been pleasant and cooperative with care. Sister has remained at bedside. Will monitor

## 2020-01-01 NOTE — PROGRESS NOTES
Patient is seen and examined today.    L ulnar dorsal wrist swelling and pain is about the same. Continue antibiotics and ice packing.    R dorsal wrist and hand synovial fluid collection is smaller after patient applied ice packing whole night. I did aspiration at bedside anyway, which returned about 3 ml of clear fluid. Unable to complete aspiration due to pain. I sent fluid for culture and sensitivity and also injected 1ml of Kenalog.. It seems to be inflammatory to me. Light pressure dressing is applied. Continue ice packing.    Patient still can go home from hand surgery standpoint.

## 2020-01-01 NOTE — PROGRESS NOTES
Case Management Discharge Note      Final Note: Discharged to home and went by Taxi on 1/1/20. LILA Pichardo Rn, CCP.     Provided post acute provider list?: Yes  Post Acute Provider Lists: Home Health, Nursing Home  Post Acuite Provider List: Delivered  Delivered To: Patient  Method of Delivery: In person    Destination      No service has been selected for the patient.      Durable Medical Equipment      No service has been selected for the patient.      Dialysis/Infusion      No service has been selected for the patient.      Home Medical Care      No service has been selected for the patient.      Therapy      No service has been selected for the patient.      Community Resources      No service has been selected for the patient.        Transportation Services  Taxi: other(not sure which taxi service was called)    Final Discharge Disposition Code: 01 - home or self-care

## 2020-01-02 ENCOUNTER — READMISSION MANAGEMENT (OUTPATIENT)
Dept: CALL CENTER | Facility: HOSPITAL | Age: 60
End: 2020-01-02

## 2020-01-02 NOTE — OUTREACH NOTE
Prep Survey      Responses   Facility patient discharged from?  [facility] [place]   Is patient eligible?  Yes   Discharge diagnosis  Cellulitis of left hand    Does the patient have one of the following disease processes/diagnoses(primary or secondary)?  Other   Does the patient have Home health ordered?  No   Is there a DME ordered?  No   Prep survey completed?  Yes          Nani Castellanos RN

## 2020-01-02 NOTE — PAYOR COMM NOTE
"ATTN: Juan David Nurse Reviewer   REF: [id number]  RE: Discharge Notification     Loni Quan  Utilization Review/Room Reservations  Phone: Estrellita Zheng: [phone], Loni: [phone]  Fax: [phone]  Email: [email or web address]  Please call, fax back, or email with authorization or any questions! Thanks!      Kwame Andres (59 y.o. Male)     Date of Birth Social Security Number Address Home Phone MRN    1960  [address] [facility] [address] [phone] [id number]    [employer]ah[employer] Marital Status          None Single       Admission Date Admission Type Admitting Provider Attending Provider Department, Room/Bed    12/19/19 Emergency Bry Castañeda MD  [address], P485/1    Discharge Date Discharge Disposition Discharge Destination        1/1/2020 Home or Self Care              Attending Provider:  (none)   Allergies:  No Known Allergies    Isolation:  Contact   Infection:  MRSA (12/19/19)   Code Status:  Prior    Ht:  188 cm (74.02\")   Wt:  92.9 kg (204 lb 12.9 oz)    Admission Cmt:  None   Principal Problem:  Cellulitis of left hand [L03.114]                 Active Insurance as of 12/19/2019     Primary Coverage     Payor Plan Insurance Group Employer/Plan Group    [id number]     Payor Plan Address Payor Plan Phone Number Payor Plan Fax Number Effective Dates    [address] [phone]  8/12/2015 - None Entered    [facility] [address]       Subscriber Name Subscriber Birth Date Member ID       KWAME ANDRES 1960 [id number]                 Emergency Contacts      (Rel.) Home Phone Work Phone Mobile Phone    Jose Angel Olson (Father) -- -- [phone]               Discharge Summary      Mike Bethea MD at 01/01/20 1415                              [place] [facility]               [facility]    Date of Discharge:  1/1/2020    PCP: Provider, No Known    Discharge Diagnosis:   Active Hospital Problems    " Diagnosis  POA   • **Cellulitis of left hand [L03.114]  Yes   • Iron deficiency anemia [D50.9]  Yes   • History of methicillin resistant staphylococcus aureus (MRSA) [Z86.14]  Yes   • PTSD (post-traumatic stress disorder) [F43.10]  Yes   • Opioid use disorder, moderate, in sustained remission (CMS/HCC) [F11.21]  Yes   • Hypertension [I10]  Yes   • Diabetes mellitus (CMS/HCC) [E11.9]  Yes      Resolved Hospital Problems   No resolved problems to display.     Procedures Performed       Consults     Date and Time Order Name Status Description    12/24/2019 0959 Inpatient Hand Surgery Consult Completed     12/19/2019 1001 Inpatient Infectious Diseases Consult Completed     12/19/2019 0824 Inpatient Orthopedic Surgery Consult Completed     12/19/2019 0351 [facility] (on-call MD unless specified) Details Completed         Hospital Course    This is a 59-year-old male with past medical history of diabetes mellitus, chronic pain, cellulitis of wrist/ forearm due to abscesses, hypertension was admitted to the hospital main complaints cellulitis left upper extremity as well as he eventually developed swelling of right upper extremity as well.  Patient was started on antibiotics.  Infectious Disease did manage patient's antibiotics.  Patient was eventually evaluated by hand surgeon.  Patient's right hand swelling was drained.  Steroid injection was also administered into the lesion.  The fluid has been sent for culture.  Patient will closely follow up with hand surgeon on outpatient basis.  He will complete a course of antibiotics on outpatient basis.  I have seen examined patient by the bedside today on the day of discharge and total time spent is more than 30 min.  Plan of care was discussed with the patient and he has verbalized understanding.    Condition on Discharge: Improved.     Temp:  [98.3 °F (36.8 °C)-98.9 °F (37.2 °C)] 98.3 °F (36.8 °C)  Heart Rate:  [] 84  Resp:  [16-18] 16  BP: (107-134)/(60-76) 115/68  Body mass  index is 26.28 kg/m².    Physical Exam   General Appearance:  Comfortable and in no acute distress.    Output: Producing urine.    HEENT: Normal HEENT exam.    Lungs:  Normal effort and normal respiratory rate.  Breath sounds clear to auscultation.  He is not in respiratory distress.    Heart: Normal rate.  Regular rhythm.    Abdomen: Abdomen is soft.  Bowel sounds are normal.   There is no abdominal tenderness.     Extremities: There is no dependent edema.    Pulses: Distal pulses are intact.    Neurological: Patient is alert and oriented to person, place and time.  Normal strength.  Patient has normal muscle tone.    Skin:  Warm and dry.  No rash.        Discharge Medications      New Medications      Instructions Start Date   linezolid 600 MG tablet  Commonly known as:  ZYVOX   600 mg, Oral, Every 12 Hours Scheduled         Changes to Medications      Instructions Start Date   carvedilol 3.125 MG tablet  Commonly known as:  COREG  What changed:    · medication strength  · how much to take  · when to take this   3.125 mg, Oral, 2 Times Daily With Meals         Continue These Medications      Instructions Start Date   aspirin 81 MG chewable tablet   Oral, 2 Times Daily      cloNIDine 0.1 MG tablet  Commonly known as:  CATAPRES   0.1 mg, Oral, 3 Times Daily      DULoxetine 30 MG capsule  Commonly known as:  CYMBALTA   30 mg, Oral, Daily      furosemide 40 MG tablet  Commonly known as:  LASIX   Oral, Daily      hydrOXYzine 50 MG tablet  Commonly known as:  ATARAX   50 mg, Oral, 3 Times Daily PRN      insulin detemir 100 UNIT/ML injection  Commonly known as:  LEVEMIR   20 Units, Subcutaneous, Daily, in am       metFORMIN 500 MG tablet  Commonly known as:  GLUCOPHAGE   Oral, Daily      pravastatin 20 MG tablet  Commonly known as:  PRAVACHOL   40 mg, Oral, Daily         Stop These Medications    insulin lispro 100 UNIT/ML injection  Commonly known as:  humaLOG             Additional Instructions for the Follow-ups that  You Need to Schedule     Discharge Follow-up with PCP   As directed       Currently Documented PCP:    Provider, No Known    PCP Phone Number:    None     Follow Up Details:  Follow up With PCP in 7 days.         Discharge Follow-up with Specified Provider: With Dr. Enrique olsen surgeon; 2 Weeks   As directed      To:  With Dr. Enrique olsen surgeon    Follow Up:  2 Weeks           Follow-up Information     Provider, No Known .    Why:  Follow up With PCP in 7 days.  Contact information:  [facility] [address]                 Test Results Pending at Discharge   Order Current Status    Anaerobic Culture - Body Fluid, Hand, Right In process    Body Fluid Culture - Body Fluid, Hand, Right In process         Mike Bethea MD  01/01/20  2:15 PM    Discharge time spent greater than 30 minutes.    Electronically signed by Mike Bethea MD at 01/01/20 8245

## 2020-01-02 NOTE — PROGRESS NOTES
Discharge Planning Assessment  [facility]     Patient Name: Kwame Andres  MRN: [id number]  Today's Date: 1/2/2020    Admit Date: 12/19/2019    Discharge Needs Assessment    No documentation.       Discharge Plan     Row Name 01/02/20 6960       Plan    Plan Comments  Spoke with the patient and he is going to call [employer] office in the AM and find out what he needs to do to make it active. He will call in the AM for a discount card for Zyvox and see if there is any assistance with this medicine. He states he can come to the hospital tomorrow to  discount card if needed. LILA Pichardo Rn, CCP    Final Discharge Disposition Code  01 - home or self-care        Destination      Coordination has not been started for this encounter.      Durable Medical Equipment      Coordination has not been started for this encounter.      Dialysis/Infusion      Coordination has not been started for this encounter.      Home Medical Care      Coordination has not been started for this encounter.      Therapy      Coordination has not been started for this encounter.      Community Resources      Coordination has not been started for this encounter.        Expected Discharge Date and Time     Expected Discharge Date Expected Discharge Time    Jan 1, 2020  6:43 PM        Demographic Summary    No documentation.       Functional Status    No documentation.       Psychosocial    No documentation.       Abuse/Neglect    No documentation.       Legal    No documentation.       Substance Abuse    No documentation.       Patient Forms    No documentation.           Apryl Pichardo RN

## 2020-01-03 NOTE — PROGRESS NOTES
Discharge Planning Assessment  [facility]     Patient Name: Albert Andres  MRN: [id number]  Today's Date: 1/3/2020    Admit Date: 12/19/2019    Discharge Needs Assessment    No documentation.       Discharge Plan     Row Name 01/03/20 1022       Plan    Plan Comments  The patient's sister called back today and states that albert called [employer] and he does have coverage and he had not termed. Notified them to take that information to [facility] or [facility] so that he can get his prescription for Zyvox po. Informed them that [facility] has a coupon which would make the cost at [facility] approx. $36.00. Was willing to print out and have them come and get it or was willing to fax to pharmacy of their choice. The sister states she will call back if they need this printed or need any other assistance. Notified Shruthi Kiera of this case along with Arianna Schmid. LILA Pichardo RN, CCP.    Final Discharge Disposition Code  01 - home or self-care        Destination      Coordination has not been started for this encounter.      Durable Medical Equipment      Coordination has not been started for this encounter.      Dialysis/Infusion      Coordination has not been started for this encounter.      Home Medical Care      Coordination has not been started for this encounter.      Therapy      Coordination has not been started for this encounter.      Community Resources      Coordination has not been started for this encounter.        Expected Discharge Date and Time     Expected Discharge Date Expected Discharge Time    Jan 1, 2020  6:43 PM        Demographic Summary    No documentation.       Functional Status    No documentation.       Psychosocial    No documentation.       Abuse/Neglect    No documentation.       Legal    No documentation.       Substance Abuse    No documentation.       Patient Forms    No documentation.           Apryl Pichardo RN

## 2020-01-04 LAB
BACTERIA FLD CULT: NORMAL
GRAM STN SPEC: NORMAL
GRAM STN SPEC: NORMAL

## 2020-01-06 ENCOUNTER — READMISSION MANAGEMENT (OUTPATIENT)
Dept: CALL CENTER | Facility: HOSPITAL | Age: 60
End: 2020-01-06

## 2020-01-06 LAB — BACTERIA SPEC ANAEROBE CULT: NORMAL

## 2020-01-06 NOTE — OUTREACH NOTE
Medical Week 1 Survey      Responses   Facility patient discharged from?  [facility]   Does the patient have one of the following disease processes/diagnoses(primary or secondary)?  Other   Is there a successful TCM telephone encounter documented?  No   Week 1 attempt successful?  No   Unsuccessful attempts  Attempt 1          Lavonne Beard RN

## 2020-01-08 ENCOUNTER — READMISSION MANAGEMENT (OUTPATIENT)
Dept: CALL CENTER | Facility: HOSPITAL | Age: 60
End: 2020-01-08

## 2020-01-08 NOTE — OUTREACH NOTE
Medical Week 1 Survey      Responses   Facility patient discharged from?  [place]   Does the patient have one of the following disease processes/diagnoses(primary or secondary)?  Other   Is there a successful TCM telephone encounter documented?  No   Week 1 attempt successful?  No   Unsuccessful attempts  Attempt 2          Berry Boo RN

## 2020-01-10 ENCOUNTER — READMISSION MANAGEMENT (OUTPATIENT)
Dept: CALL CENTER | Facility: HOSPITAL | Age: 60
End: 2020-01-10

## 2020-01-10 NOTE — OUTREACH NOTE
Medical Week 2 Survey      Responses   Facility patient discharged from?  [place]   Does the patient have one of the following disease processes/diagnoses(primary or secondary)?  Other   Week 2 attempt successful?  No   Unsuccessful attempts  Attempt 1          Patricia Diego RN

## 2020-01-15 ENCOUNTER — READMISSION MANAGEMENT (OUTPATIENT)
Dept: CALL CENTER | Facility: HOSPITAL | Age: 60
End: 2020-01-15

## 2020-01-15 NOTE — OUTREACH NOTE
Medical Week 2 Survey      Responses   Facility patient discharged from?  [place]   Does the patient have one of the following disease processes/diagnoses(primary or secondary)?  Other   Week 2 attempt successful?  No   Unsuccessful attempts  Attempt 2          Berry Boo RN

## 2020-01-17 ENCOUNTER — READMISSION MANAGEMENT (OUTPATIENT)
Dept: CALL CENTER | Facility: HOSPITAL | Age: 60
End: 2020-01-17

## 2020-01-17 NOTE — OUTREACH NOTE
Medical Week 3 Survey      Responses   Facility patient discharged from?  [place]   Does the patient have one of the following disease processes/diagnoses(primary or secondary)?  Other   Week 3 attempt successful?  No   Unsuccessful attempts  Attempt 1          Nereyda Maldonado RN

## 2020-01-19 ENCOUNTER — READMISSION MANAGEMENT (OUTPATIENT)
Dept: CALL CENTER | Facility: HOSPITAL | Age: 60
End: 2020-01-19

## 2020-01-19 NOTE — OUTREACH NOTE
Medical Week 2 Survey      Responses   Facility patient discharged from?  [facility]   Does the patient have one of the following disease processes/diagnoses(primary or secondary)?  Other          Claudia Willis RN

## 2021-03-22 ENCOUNTER — BULK ORDERING (OUTPATIENT)
Dept: CASE MANAGEMENT | Facility: OTHER | Age: 61
End: 2021-03-22

## 2021-03-22 DIAGNOSIS — Z23 IMMUNIZATION DUE: ICD-10-CM

## 2021-11-19 ENCOUNTER — HOSPITAL ENCOUNTER (INPATIENT)
Facility: HOSPITAL | Age: 61
LOS: 4 days | Discharge: HOME | End: 2021-11-23
Attending: EMERGENCY MEDICINE | Admitting: INTERNAL MEDICINE

## 2021-11-19 ENCOUNTER — APPOINTMENT (OUTPATIENT)
Dept: CT IMAGING | Facility: HOSPITAL | Age: 61
End: 2021-11-19

## 2021-11-19 DIAGNOSIS — D64.9 CHRONIC ANEMIA: ICD-10-CM

## 2021-11-19 DIAGNOSIS — J96.11 CHRONIC HYPOXEMIC RESPIRATORY FAILURE (HCC): ICD-10-CM

## 2021-11-19 DIAGNOSIS — K62.5 BRIGHT RED BLOOD PER RECTUM: ICD-10-CM

## 2021-11-19 DIAGNOSIS — Z86.16 HISTORY OF 2019 NOVEL CORONAVIRUS DISEASE (COVID-19): ICD-10-CM

## 2021-11-19 DIAGNOSIS — K52.9 ACUTE COLITIS: Primary | ICD-10-CM

## 2021-11-19 DIAGNOSIS — Z79.01 CHRONIC ANTICOAGULATION: ICD-10-CM

## 2021-11-19 PROBLEM — K92.1 HEMATOCHEZIA: Status: ACTIVE | Noted: 2021-11-19

## 2021-11-19 PROBLEM — F19.10 POLYSUBSTANCE ABUSE (HCC): Status: ACTIVE | Noted: 2021-11-19

## 2021-11-19 PROBLEM — F11.20 METHADONE DEPENDENCE (HCC): Status: ACTIVE | Noted: 2021-11-19

## 2021-11-19 LAB
ABO GROUP BLD: NORMAL
ADV 40+41 DNA STL QL NAA+NON-PROBE: NOT DETECTED
ALBUMIN SERPL-MCNC: 3.9 G/DL (ref 3.5–5.2)
ALBUMIN/GLOB SERPL: 1.1 G/DL
ALP SERPL-CCNC: 108 U/L (ref 39–117)
ALT SERPL W P-5'-P-CCNC: 18 U/L (ref 1–41)
AMPHET+METHAMPHET UR QL: NEGATIVE
ANION GAP SERPL CALCULATED.3IONS-SCNC: 7.7 MMOL/L (ref 5–15)
AST SERPL-CCNC: 19 U/L (ref 1–40)
ASTRO TYP 1-8 RNA STL QL NAA+NON-PROBE: NOT DETECTED
BARBITURATES UR QL SCN: NEGATIVE
BASOPHILS # BLD AUTO: 0.04 10*3/MM3 (ref 0–0.2)
BASOPHILS NFR BLD AUTO: 1 % (ref 0–1.5)
BENZODIAZ UR QL SCN: NEGATIVE
BILIRUB SERPL-MCNC: 0.2 MG/DL (ref 0–1.2)
BLD GP AB SCN SERPL QL: NEGATIVE
BUN SERPL-MCNC: 9 MG/DL (ref 8–23)
BUN/CREAT SERPL: 13.2 (ref 7–25)
C CAYETANENSIS DNA STL QL NAA+NON-PROBE: NOT DETECTED
C COLI+JEJ+UPSA DNA STL QL NAA+NON-PROBE: NOT DETECTED
CALCIUM SPEC-SCNC: 8.5 MG/DL (ref 8.6–10.5)
CANNABINOIDS SERPL QL: NEGATIVE
CHLORIDE SERPL-SCNC: 105 MMOL/L (ref 98–107)
CO2 SERPL-SCNC: 27.3 MMOL/L (ref 22–29)
COCAINE UR QL: NEGATIVE
CREAT SERPL-MCNC: 0.68 MG/DL (ref 0.76–1.27)
CRYPTOSP DNA STL QL NAA+NON-PROBE: NOT DETECTED
DEPRECATED RDW RBC AUTO: 51.7 FL (ref 37–54)
E HISTOLYT DNA STL QL NAA+NON-PROBE: NOT DETECTED
EAEC PAA PLAS AGGR+AATA ST NAA+NON-PRB: NOT DETECTED
EC STX1+STX2 GENES STL QL NAA+NON-PROBE: NOT DETECTED
EOSINOPHIL # BLD AUTO: 0.16 10*3/MM3 (ref 0–0.4)
EOSINOPHIL NFR BLD AUTO: 3.9 % (ref 0.3–6.2)
EPEC EAE GENE STL QL NAA+NON-PROBE: NOT DETECTED
ERYTHROCYTE [DISTWIDTH] IN BLOOD BY AUTOMATED COUNT: 18.1 % (ref 12.3–15.4)
ETEC LTA+ST1A+ST1B TOX ST NAA+NON-PROBE: NOT DETECTED
ETHANOL BLD-MCNC: <10 MG/DL (ref 0–10)
ETHANOL UR QL: <0.01 %
G LAMBLIA DNA STL QL NAA+NON-PROBE: NOT DETECTED
GFR SERPL CREATININE-BSD FRML MDRD: 144 ML/MIN/1.73
GLOBULIN UR ELPH-MCNC: 3.4 GM/DL
GLUCOSE BLDC GLUCOMTR-MCNC: 84 MG/DL (ref 70–130)
GLUCOSE SERPL-MCNC: 104 MG/DL (ref 65–99)
HBA1C MFR BLD: 6 % (ref 4.8–5.6)
HCT VFR BLD AUTO: 27.7 % (ref 37.5–51)
HCT VFR BLD AUTO: 29.3 % (ref 37.5–51)
HGB BLD-MCNC: 8.8 G/DL (ref 13–17.7)
HGB BLD-MCNC: 9 G/DL (ref 13–17.7)
IMM GRANULOCYTES # BLD AUTO: 0.02 10*3/MM3 (ref 0–0.05)
IMM GRANULOCYTES NFR BLD AUTO: 0.5 % (ref 0–0.5)
LIPASE SERPL-CCNC: 13 U/L (ref 13–60)
LYMPHOCYTES # BLD AUTO: 1 10*3/MM3 (ref 0.7–3.1)
LYMPHOCYTES NFR BLD AUTO: 24.4 % (ref 19.6–45.3)
MCH RBC QN AUTO: 25.2 PG (ref 26.6–33)
MCHC RBC AUTO-ENTMCNC: 31.8 G/DL (ref 31.5–35.7)
MCV RBC AUTO: 79.4 FL (ref 79–97)
METHADONE UR QL SCN: POSITIVE
MONOCYTES # BLD AUTO: 0.46 10*3/MM3 (ref 0.1–0.9)
MONOCYTES NFR BLD AUTO: 11.2 % (ref 5–12)
NEUTROPHILS NFR BLD AUTO: 2.42 10*3/MM3 (ref 1.7–7)
NEUTROPHILS NFR BLD AUTO: 59 % (ref 42.7–76)
NOROVIRUS GI+II RNA STL QL NAA+NON-PROBE: NOT DETECTED
NRBC BLD AUTO-RTO: 0 /100 WBC (ref 0–0.2)
OPIATES UR QL: POSITIVE
OXYCODONE UR QL SCN: NEGATIVE
P SHIGELLOIDES DNA STL QL NAA+NON-PROBE: NOT DETECTED
PLATELET # BLD AUTO: 315 10*3/MM3 (ref 140–450)
PMV BLD AUTO: 10.3 FL (ref 6–12)
POTASSIUM SERPL-SCNC: 4 MMOL/L (ref 3.5–5.2)
PROT SERPL-MCNC: 7.3 G/DL (ref 6–8.5)
RBC # BLD AUTO: 3.49 10*6/MM3 (ref 4.14–5.8)
RH BLD: POSITIVE
RVA RNA STL QL NAA+NON-PROBE: NOT DETECTED
S ENT+BONG DNA STL QL NAA+NON-PROBE: NOT DETECTED
SAPO I+II+IV+V RNA STL QL NAA+NON-PROBE: NOT DETECTED
SARS-COV-2 RNA RESP QL NAA+PROBE: NOT DETECTED
SHIGELLA SP+EIEC IPAH ST NAA+NON-PROBE: NOT DETECTED
SODIUM SERPL-SCNC: 140 MMOL/L (ref 136–145)
T&S EXPIRATION DATE: NORMAL
V CHOL+PARA+VUL DNA STL QL NAA+NON-PROBE: NOT DETECTED
V CHOLERAE DNA STL QL NAA+NON-PROBE: NOT DETECTED
WBC NRBC COR # BLD: 4.1 10*3/MM3 (ref 3.4–10.8)
Y ENTEROCOL DNA STL QL NAA+NON-PROBE: NOT DETECTED

## 2021-11-19 PROCEDURE — 80307 DRUG TEST PRSMV CHEM ANLYZR: CPT | Performed by: NURSE PRACTITIONER

## 2021-11-19 PROCEDURE — G0378 HOSPITAL OBSERVATION PER HR: HCPCS

## 2021-11-19 PROCEDURE — 99254 IP/OBS CNSLTJ NEW/EST MOD 60: CPT | Performed by: INTERNAL MEDICINE

## 2021-11-19 PROCEDURE — 99285 EMERGENCY DEPT VISIT HI MDM: CPT

## 2021-11-19 PROCEDURE — 25010000002 HYDROMORPHONE PER 4 MG: Performed by: EMERGENCY MEDICINE

## 2021-11-19 PROCEDURE — 74177 CT ABD & PELVIS W/CONTRAST: CPT

## 2021-11-19 PROCEDURE — 80053 COMPREHEN METABOLIC PANEL: CPT | Performed by: EMERGENCY MEDICINE

## 2021-11-19 PROCEDURE — 85025 COMPLETE CBC W/AUTO DIFF WBC: CPT | Performed by: EMERGENCY MEDICINE

## 2021-11-19 PROCEDURE — 85018 HEMOGLOBIN: CPT | Performed by: NURSE PRACTITIONER

## 2021-11-19 PROCEDURE — 25010000002 IOPAMIDOL 61 % SOLUTION: Performed by: EMERGENCY MEDICINE

## 2021-11-19 PROCEDURE — 85014 HEMATOCRIT: CPT | Performed by: NURSE PRACTITIONER

## 2021-11-19 PROCEDURE — 82077 ASSAY SPEC XCP UR&BREATH IA: CPT | Performed by: NURSE PRACTITIONER

## 2021-11-19 PROCEDURE — 83690 ASSAY OF LIPASE: CPT | Performed by: EMERGENCY MEDICINE

## 2021-11-19 PROCEDURE — 83036 HEMOGLOBIN GLYCOSYLATED A1C: CPT | Performed by: NURSE PRACTITIONER

## 2021-11-19 PROCEDURE — 86901 BLOOD TYPING SEROLOGIC RH(D): CPT | Performed by: EMERGENCY MEDICINE

## 2021-11-19 PROCEDURE — 0097U HC BIOFIRE FILMARRAY GI PANEL: CPT | Performed by: EMERGENCY MEDICINE

## 2021-11-19 PROCEDURE — 86900 BLOOD TYPING SEROLOGIC ABO: CPT | Performed by: EMERGENCY MEDICINE

## 2021-11-19 PROCEDURE — 36415 COLL VENOUS BLD VENIPUNCTURE: CPT | Performed by: NURSE PRACTITIONER

## 2021-11-19 PROCEDURE — 82962 GLUCOSE BLOOD TEST: CPT

## 2021-11-19 PROCEDURE — U0003 INFECTIOUS AGENT DETECTION BY NUCLEIC ACID (DNA OR RNA); SEVERE ACUTE RESPIRATORY SYNDROME CORONAVIRUS 2 (SARS-COV-2) (CORONAVIRUS DISEASE [COVID-19]), AMPLIFIED PROBE TECHNIQUE, MAKING USE OF HIGH THROUGHPUT TECHNOLOGIES AS DESCRIBED BY CMS-2020-01-R: HCPCS | Performed by: EMERGENCY MEDICINE

## 2021-11-19 PROCEDURE — 86850 RBC ANTIBODY SCREEN: CPT | Performed by: EMERGENCY MEDICINE

## 2021-11-19 RX ORDER — INSULIN GLARGINE 100 [IU]/ML
10 INJECTION, SOLUTION SUBCUTANEOUS DAILY
Status: DISCONTINUED | OUTPATIENT
Start: 2021-11-20 | End: 2021-11-23 | Stop reason: HOSPADM

## 2021-11-19 RX ORDER — DEXTROSE MONOHYDRATE 25 G/50ML
25 INJECTION, SOLUTION INTRAVENOUS
Status: DISCONTINUED | OUTPATIENT
Start: 2021-11-19 | End: 2021-11-23 | Stop reason: HOSPADM

## 2021-11-19 RX ORDER — ACETAMINOPHEN 650 MG/1
650 SUPPOSITORY RECTAL EVERY 4 HOURS PRN
Status: DISCONTINUED | OUTPATIENT
Start: 2021-11-19 | End: 2021-11-23 | Stop reason: HOSPADM

## 2021-11-19 RX ORDER — NITROGLYCERIN 0.4 MG/1
0.4 TABLET SUBLINGUAL
Status: DISCONTINUED | OUTPATIENT
Start: 2021-11-19 | End: 2021-11-23 | Stop reason: HOSPADM

## 2021-11-19 RX ORDER — METHADONE HYDROCHLORIDE 10 MG/1
20 TABLET ORAL ONCE
Status: COMPLETED | OUTPATIENT
Start: 2021-11-19 | End: 2021-11-19

## 2021-11-19 RX ORDER — ACETAMINOPHEN 325 MG/1
650 TABLET ORAL EVERY 4 HOURS PRN
Status: DISCONTINUED | OUTPATIENT
Start: 2021-11-19 | End: 2021-11-23 | Stop reason: HOSPADM

## 2021-11-19 RX ORDER — UREA 10 %
220 LOTION (ML) TOPICAL
COMMUNITY

## 2021-11-19 RX ORDER — SODIUM CHLORIDE 0.9 % (FLUSH) 0.9 %
10 SYRINGE (ML) INJECTION EVERY 12 HOURS SCHEDULED
Status: DISCONTINUED | OUTPATIENT
Start: 2021-11-19 | End: 2021-11-23 | Stop reason: HOSPADM

## 2021-11-19 RX ORDER — SODIUM CHLORIDE 0.9 % (FLUSH) 0.9 %
10 SYRINGE (ML) INJECTION AS NEEDED
Status: DISCONTINUED | OUTPATIENT
Start: 2021-11-19 | End: 2021-11-23 | Stop reason: HOSPADM

## 2021-11-19 RX ORDER — NICOTINE POLACRILEX 4 MG
15 LOZENGE BUCCAL
Status: DISCONTINUED | OUTPATIENT
Start: 2021-11-19 | End: 2021-11-23 | Stop reason: HOSPADM

## 2021-11-19 RX ORDER — HYDROMORPHONE HYDROCHLORIDE 1 MG/ML
0.5 INJECTION, SOLUTION INTRAMUSCULAR; INTRAVENOUS; SUBCUTANEOUS ONCE
Status: COMPLETED | OUTPATIENT
Start: 2021-11-19 | End: 2021-11-19

## 2021-11-19 RX ORDER — CLONAZEPAM 0.5 MG/1
0.5 TABLET ORAL 2 TIMES DAILY PRN
COMMUNITY

## 2021-11-19 RX ORDER — INSULIN LISPRO 100 [IU]/ML
0-9 INJECTION, SOLUTION INTRAVENOUS; SUBCUTANEOUS
Status: DISCONTINUED | OUTPATIENT
Start: 2021-11-19 | End: 2021-11-23 | Stop reason: HOSPADM

## 2021-11-19 RX ORDER — FUROSEMIDE 40 MG/1
40 TABLET ORAL DAILY
Status: DISCONTINUED | OUTPATIENT
Start: 2021-11-20 | End: 2021-11-20

## 2021-11-19 RX ORDER — CARVEDILOL 3.12 MG/1
3.12 TABLET ORAL 2 TIMES DAILY WITH MEALS
Status: DISCONTINUED | OUTPATIENT
Start: 2021-11-19 | End: 2021-11-23 | Stop reason: HOSPADM

## 2021-11-19 RX ORDER — CLONIDINE HYDROCHLORIDE 0.1 MG/1
0.1 TABLET ORAL 3 TIMES DAILY
Status: DISCONTINUED | OUTPATIENT
Start: 2021-11-19 | End: 2021-11-23 | Stop reason: HOSPADM

## 2021-11-19 RX ORDER — CLONAZEPAM 0.5 MG/1
0.5 TABLET ORAL 2 TIMES DAILY PRN
Status: DISCONTINUED | OUTPATIENT
Start: 2021-11-19 | End: 2021-11-23 | Stop reason: HOSPADM

## 2021-11-19 RX ORDER — DULOXETIN HYDROCHLORIDE 30 MG/1
30 CAPSULE, DELAYED RELEASE ORAL DAILY
Status: DISCONTINUED | OUTPATIENT
Start: 2021-11-20 | End: 2021-11-23 | Stop reason: HOSPADM

## 2021-11-19 RX ORDER — ONDANSETRON 2 MG/ML
4 INJECTION INTRAMUSCULAR; INTRAVENOUS EVERY 6 HOURS PRN
Status: DISCONTINUED | OUTPATIENT
Start: 2021-11-19 | End: 2021-11-23 | Stop reason: HOSPADM

## 2021-11-19 RX ORDER — ACETAMINOPHEN 160 MG/5ML
650 SOLUTION ORAL EVERY 4 HOURS PRN
Status: DISCONTINUED | OUTPATIENT
Start: 2021-11-19 | End: 2021-11-23 | Stop reason: HOSPADM

## 2021-11-19 RX ORDER — PRAVASTATIN SODIUM 40 MG
40 TABLET ORAL DAILY
Status: DISCONTINUED | OUTPATIENT
Start: 2021-11-20 | End: 2021-11-23 | Stop reason: HOSPADM

## 2021-11-19 RX ORDER — METHADONE HYDROCHLORIDE 10 MG/1
20 TABLET ORAL 2 TIMES DAILY
COMMUNITY

## 2021-11-19 RX ADMIN — SODIUM CHLORIDE, PRESERVATIVE FREE 10 ML: 5 INJECTION INTRAVENOUS at 23:09

## 2021-11-19 RX ADMIN — IOPAMIDOL 85 ML: 612 INJECTION, SOLUTION INTRAVENOUS at 10:07

## 2021-11-19 RX ADMIN — METHADONE HYDROCHLORIDE 20 MG: 10 TABLET ORAL at 23:06

## 2021-11-19 RX ADMIN — HYDROMORPHONE HYDROCHLORIDE 0.5 MG: 1 INJECTION, SOLUTION INTRAMUSCULAR; INTRAVENOUS; SUBCUTANEOUS at 09:30

## 2021-11-19 NOTE — H&P
Patient Name:  Kwame Andres  YOB: 1960  MRN:  [id number]  Admit Date:  11/19/2021  Patient Care Team:  Provider, No Known as PCP - General  Provider, No Known as PCP - Family Medicine      Subjective   History Present Illness     Chief Complaint   Patient presents with   • Black or Bloody Stool   • Shortness of Breath       Mr. Andres is a 61 y.o. with a history of HTN, TYREL, methadone dependence, MRSA, recent COVID-19 admission at [facility] October 2021, history of C. Difficile.  He was admitted 10/17-11/1 to [facility] with COVID-19 treated with Decadron, Remdesivir, azithromycin, baricitinib, and requiring mechanical ventilation.  He was discharged on Eliquis 5 mg twice daily for PE prophylaxis given Ddimer of 4k at time of discharge. He was also discharged on supplemental 02 at 4L.  He states he has been slowly improving from the COVID-19 standpoint but still gets short of breath with ambulation.  Last week he had 2 episodes of diarrhea which he attributed to eating bad chicken.  Proximately 4 days ago he started having episodes of mucousy loose stool with fecal urgency and incontinence.  Diarrhea is worse after eating but can occur without exacerbating factors.  Nothing is made the diarrhea worse.  Mild abdominal pain no nausea or vomiting.  Yesterday he had 2 stools with blood in it so he came to the ER for further evaluation.  The past 3 days he has taken 2 aspirin daily for dental pain. Stools are similar to when he had C. Difficile.  He denies black stool, hematemesis, fever, chills, chest pain, palpitations, rash.         History of Present Illness  Review of Systems   Constitutional: Negative for appetite change, chills, diaphoresis and fatigue.   HENT: Negative for congestion and trouble swallowing.    Respiratory: Positive for shortness of breath (with exertion ). Negative for choking and chest tightness.    Cardiovascular: Negative for chest pain, palpitations and leg  swelling.   Gastrointestinal: Positive for abdominal pain, blood in stool and diarrhea. Negative for abdominal distention, constipation, nausea and vomiting.   Genitourinary: Negative for dysuria.   Musculoskeletal: Negative for back pain.   Skin: Negative for pallor.   Neurological: Positive for light-headedness (when short of breath ). Negative for dizziness.   Hematological: Bruises/bleeds easily.   Psychiatric/Behavioral: Negative for confusion.        Personal History     Past Medical History:   Diagnosis Date   • Arthritis    • Chronic pain    • Diabetes mellitus (CMS/HCC)    • Herniated thoracic disc without myelopathy     by right scapula   • Hypertension    • Narcotic dependence (CMS/HCC)    • Pneumonia    • PTSD (post-traumatic stress disorder)    • Torn Achilles tendon      Past Surgical History:   Procedure Laterality Date   • HIP SURGERY Right      Family History   Problem Relation Age of Onset   • Cancer Mother    • Hypertension Mother      Social History     Tobacco Use   • Smoking status: Never Smoker   • Smokeless tobacco: Not on file   Substance Use Topics   • Alcohol use: No   • Drug use: No     No current facility-administered medications on file prior to encounter.     Current Outpatient Medications on File Prior to Encounter   Medication Sig Dispense Refill   • aspirin 81 MG chewable tablet Chew 2 (two) times a day.     • carvedilol (COREG) 3.125 MG tablet Take 1 tablet by mouth 2 (Two) Times a Day With Meals. 60 tablet 0   • cloNIDine (CATAPRES) 0.1 MG tablet Take 1 tablet by mouth 3 (three) times a day. 21 tablet 0   • DULoxetine (CYMBALTA) 30 MG capsule Take 1 capsule by mouth daily. 7 capsule 0   • furosemide (LASIX) 40 MG tablet Take  by mouth daily.     • hydrOXYzine (ATARAX) 50 MG tablet Take 1 tablet by mouth 3 (three) times a day as needed for itching. 21 tablet 0   • insulin detemir (LEVEMIR) 100 UNIT/ML injection Inject 20 Units under the skin daily. in am  12   • metFORMIN  (GLUCOPHAGE) 500 MG tablet Take  by mouth daily.     • pravastatin (PRAVACHOL) 20 MG tablet Take 2 tablets by mouth daily.       No Known Allergies    Objective    Objective     Vital Signs  Temp:  [97.2 °F (36.2 °C)] 97.2 °F (36.2 °C)  Heart Rate:  [103] 103  Resp:  [18] 18  BP: (139)/(82) 139/82  SpO2:  [97 %] 97 %  on  Flow (L/min):  [3] 3;   Device (Oxygen Therapy): nasal cannula  Body mass index is 28.25 kg/m².    Physical Exam  Vitals and nursing note reviewed.   Constitutional:       General: He is not in acute distress.     Appearance: He is well-developed. He is obese. He is not toxic-appearing.      Comments: Mildly ill appearing. NAD   HENT:      Head: Normocephalic and atraumatic.   Eyes:      Conjunctiva/sclera: Conjunctivae normal.   Neck:      Trachea: No tracheal deviation.   Cardiovascular:      Rate and Rhythm: Regular rhythm. Tachycardia present.   Pulmonary:      Effort: Pulmonary effort is normal. No respiratory distress.      Breath sounds: Normal breath sounds.      Comments: Decreased breath sounds and coarse in bases  Abdominal:      General: Bowel sounds are normal. There is no distension.      Palpations: Abdomen is soft. There is no mass.      Tenderness: There is abdominal tenderness. There is no guarding or rebound.   Musculoskeletal:         General: Normal range of motion.      Cervical back: Normal range of motion.      Right lower leg: No edema.      Left lower leg: No edema.   Skin:     General: Skin is warm and dry.   Neurological:      General: No focal deficit present.      Mental Status: He is alert and oriented to person, place, and time.   Psychiatric:         Mood and Affect: Mood is anxious.         Speech: Speech is rapid and pressured.         Behavior: Behavior is cooperative.         Judgment: Judgment normal.      Comments: Pleasant but very anxious         Results Review:  I reviewed the patient's new clinical results.  I reviewed the patient's new imaging results and  agree with the interpretation.  I reviewed the patient's other test results and agree with the interpretation  I personally viewed and interpreted the patient's EKG/Telemetry data  Discussed with ED provider.    Lab Results (last 24 hours)     Procedure Component Value Units Date/Time    CBC & Differential [[id number]]  (Abnormal) Collected: 11/19/21 0910    Specimen: Blood Updated: 11/19/21 0918    Narrative:      The following orders were created for panel order CBC & Differential.  Procedure                               Abnormality         Status                     ---------                               -----------         ------                     CBC Auto Differential[[id number]]        Abnormal            Final result                 Please view results for these tests on the individual orders.    Comprehensive Metabolic Panel [[id number]]  (Abnormal) Collected: 11/19/21 0910    Specimen: Blood Updated: 11/19/21 0934     Glucose 104 mg/dL      BUN 9 mg/dL      Creatinine 0.68 mg/dL      Sodium 140 mmol/L      Potassium 4.0 mmol/L      Chloride 105 mmol/L      CO2 27.3 mmol/L      Calcium 8.5 mg/dL      Total Protein 7.3 g/dL      Albumin 3.90 g/dL      ALT (SGPT) 18 U/L      AST (SGOT) 19 U/L      Alkaline Phosphatase 108 U/L      Total Bilirubin 0.2 mg/dL      eGFR  African Amer 144 mL/min/1.73      Globulin 3.4 gm/dL      A/G Ratio 1.1 g/dL      BUN/Creatinine Ratio 13.2     Anion Gap 7.7 mmol/L     Narrative:      GFR Normal >60  Chronic Kidney Disease <60  Kidney Failure <15      Lipase [[id number]]  (Normal) Collected: 11/19/21 0910    Specimen: Blood Updated: 11/19/21 0934     Lipase 13 U/L     CBC Auto Differential [[id number]]  (Abnormal) Collected: 11/19/21 0910    Specimen: Blood Updated: 11/19/21 0918     WBC 4.10 10*3/mm3      RBC 3.49 10*6/mm3      Hemoglobin 8.8 g/dL      Hematocrit 27.7 %      MCV 79.4 fL      MCH 25.2 pg      MCHC 31.8 g/dL      RDW 18.1 %      RDW-SD 51.7 fl      MPV 10.3 fL       Platelets 315 10*3/mm3      Neutrophil % 59.0 %      Lymphocyte % 24.4 %      Monocyte % 11.2 %      Eosinophil % 3.9 %      Basophil % 1.0 %      Immature Grans % 0.5 %      Neutrophils, Absolute 2.42 10*3/mm3      Lymphocytes, Absolute 1.00 10*3/mm3      Monocytes, Absolute 0.46 10*3/mm3      Eosinophils, Absolute 0.16 10*3/mm3      Basophils, Absolute 0.04 10*3/mm3      Immature Grans, Absolute 0.02 10*3/mm3      nRBC 0.0 /100 WBC     COVID PRE-OP / PRE-PROCEDURE SCREENING ORDER (NO ISOLATION) - Swab, Nasopharynx [[id number]]  (Normal) Collected: 11/19/21 1120    Specimen: Swab from Nasopharynx Updated: 11/19/21 1210    Narrative:      The following orders were created for panel order COVID PRE-OP / PRE-PROCEDURE SCREENING ORDER (NO ISOLATION) - Swab, Nasopharynx.  Procedure                               Abnormality         Status                     ---------                               -----------         ------                     COVID-19,[facility]...[[id number]]  Normal              Final result                 Please view results for these tests on the individual orders.    COVID-19,[facility] CEPHEID/JOAQUÍN NP SWAB IN TRANSPORT MEDIA 8-12 HR TAT - Swab, Nasopharynx [[id number]]  (Normal) Collected: 11/19/21 1120    Specimen: Swab from Nasopharynx Updated: 11/19/21 1210     COVID19 Not Detected    Narrative:      Fact sheet for providers: https://www.fda.gov/media/967527/download     Fact sheet for patients: https://www.fda.gov/media/472430/download          Imaging Results (Last 24 Hours)     Procedure Component Value Units Date/Time    CT Abdomen Pelvis With Contrast [[id number]] Collected: 11/19/21 1120     Updated: 11/19/21 1120    Narrative:      CT ABDOMEN AND PELVIS WITH CONTRAST     HISTORY: Abdominal discomfort with blood-streaked stool.     TECHNIQUE: Axial CT images of the abdomen and pelvis were obtained  following administration of intravenous contrast. The patient was not  given  oral contrast Coronal and sagittal reformats were obtained.     COMPARISON: 06/12/2014     FINDINGS: Circumferential wall thickening involving majority of the  sigmoid colon with mucosal hyperenhancement is present. The findings are  most suggestive of a colitis. No extraluminal air or fluid collection.  No evidence of bowel obstruction. Moderate amount of formed stool is  seen within the proximal colon. The appendix is normal.     There are patchy areas of airspace disease identified within the  visualized lung fields particularly within the right lower lobe.  Majority of these areas of ground glass density with accompanying  interstitial thickening.     The liver demonstrates normal attenuation. There is a stable  hypoattenuating left hepatic lobe lesion favored to represent a benign  hemangioma. The gallbladder is normal. The spleen is normal. Pancreas is  normal without ductal dilatation. Bilateral adrenal glands and kidneys  are normal. The urinary bladder is partially distended with  circumferential wall thickening. No pathological retroperitoneal  lymphadenopathy.       Impression:      1. CT findings of sigmoid colitis.  2. Areas of airspace disease within the visualized lower lung fields  particularly severe within the right lower lobe. The patient has history  of recent COVID pneumonia and is likely represent evolving sequela.     These findings were discussed with Dr. Ziggy Yeung by telephone at the  time of dictation.     Radiation dose reduction techniques were utilized, including automated  exposure control and exposure modulation based on body size.                    No orders to display        Assessment/Plan     Active Hospital Problems    Diagnosis  POA   • **Acute colitis [K52.9]  Yes   • Hematochezia [K92.1]  Yes   • Anticoagulated [Z79.01]  Not Applicable   • Methadone dependence (HCC) [F11.20]  Yes   • History of COVID-19 [Z86.16]  Unknown   • Iron deficiency anemia [D50.9]  Yes   • History of  methicillin resistant staphylococcus aureus (MRSA) [Z86.14]  Yes   • PTSD (post-traumatic stress disorder) [F43.10]  Yes   • Hypertension [I10]  Yes      Resolved Hospital Problems   No resolved problems to display.       Mr. Andres is a 61 y.o. reported history of C. difficile and iron deficiency anemia that was discharged 11/1 from [facility] following severe COVID-19.  He was discharged on full dose Eliquis given significantly elevated D-dimer.  He presents with mucous , diarrhea with blood.     Colitis, diarrhea, hematochezia  Admit for observation to telemetry  CT with sigmoid colitis  Await C. difficile and GI PCR panel  Hold eliquis  Vital signs stable.  GI consulted by ER physician.  Clear liquid diet    Iron deficiency anemia  Hemoglobin baseline at time of discharge from [facility] was 9.  Currently is 8.8.  Check anemia studies.  Trend hemoglobin q 12 H&H given only 2 reported mild bloody stools yesterday.  Check anemia studies.     Recent COVID-19 infection  Severe requiring intubation and baricitinib.  CT here with sequela of recent pneumonia.  COVID-19 test negative.    Discharge on eliquis for high Ddimer per note. Hold OAC and check ddimer.   Patient was discharged on 4 L but does not understand how to wean oxygen nor does he have a pulse oximeter at home.  Patient turned down to 2 L while in the room and  remains at 96%.  Maintain oxygen saturations 93% or above.  No pulmonary disease history.   IS and wean 02 as tolerated with adequate instructions the time of discharge.  Send patient home with pulse oximeter available on the COVID-19 floors    PTSD/methadone use/polysubstance abuse history  He reports a history of polysubstance abuse but is prescribed methadone as needed for chronic pain.  Thai reviewed    HTN   Continue home carvedilol and clonidine when medication reconciliation reviewed.    DM2  A1c.  Hold home Metformin.  Listed on home basal insulin which will be continue  attenuated dose and trend glucose.  Avoid hypoglycemia      · I discussed the patient's findings and my recommendations with patient, nursing staff, ED provider and Dr. Owen.    VTE Prophylaxis - SCDs.  Code Status - Full code.       GRZEGORZ Eaton  [facility]  11/19/21  13:33 EST

## 2021-11-19 NOTE — ED PROVIDER NOTES
EMERGENCY DEPARTMENT ENCOUNTER    Room Number:  LETI/LETI  Date of encounter:  11/19/2021  PCP: Provider, No Known  Historian: Pt    Patient was placed in face mask during triage process. Patient was wearing facemask when I entered the room and throughout our encounter. I wore full protective equipment throughout this patient encounter including a face mask, eye protection, and gloves. Hand hygiene was performed before donning protective equipment and again following doffing of PPE after leaving the room.    HPI:  Chief Complaint: Abdominal discomfort  A complete HPI/ROS/PMH/PSH/SH/FH are unobtainable due to: N/A   Context: Kwame Andres is a 61 y.o. male who presents to the ED c/o abdominal cramping that comes in waves over the last 5 to 7 days with associated mucousy and occasionally blood-streaked stooling.  Patient has had some fecal incontinence during sleep.  Stools have been rather loose.  Patient does have a remote history of C. difficile and was recently discharged from hospital following an episode of COVID-19 pneumonia and hypoxia.  Following that hospitalization he was placed on full anticoagulation and supplemental oxygen.  He reports over the last 2 weeks he has had intermittent episodes of mucousy bloody stool that became constant over the last 5 days.  Symptoms worsen with eating.  No vomiting reported.  No fevers reported.  Chronic dyspnea unchanged.  Discomfort mild at this time but intermittently is severe.  Patient uncertain of any antibiotics they may have received as inpatient during his last hospitalization but reports no oral antibiotics since discharge.  He did eat some chicken salad a couple of weeks ago which was followed by 2 days of diarrhea but then resolved for a week before current onset.      MEDICAL HISTORY REVIEW  Admit date: 10/17/2021  Admitting physician: Maren Bailey MD  Discharge date and time: 11/1/2021 11:15 AM  Discharging physician: JH Peoples  MD  Discharged Condition: stable    Discharge Diagnoses:Principal Problem:  COVID-19 (10/18/2021) POA: Unknown  Active Problems:  DM type 2 (diabetes mellitus, type 2) (CMS/[facility]) (10/21/2013) POA: Yes  Chest pain (5/29/2014) POA: Yes  Hypoxia (10/18/2021) POA: Unknown  Bilateral pneumonia (10/18/2021) POA: Unknown          PAST MEDICAL HISTORY  Active Ambulatory Problems     Diagnosis Date Noted   • Chest pain at rest 08/24/2016   • Chronic pain 08/24/2016   • Diabetes mellitus (HCC) 08/24/2016   • Hypertension 08/24/2016   • DDD (degenerative disc disease), lumbar 08/24/2016   • Pulmonary nodule, needs follow up PET scan as outpatient 08/26/2016   • Drug-seeking behavior 08/26/2016   • Depression with suicidal ideation 09/20/2016   • History of methicillin resistant staphylococcus aureus (MRSA) 12/19/2019   • PTSD (post-traumatic stress disorder) 12/19/2019   • Opioid use disorder, moderate, in sustained remission ([facility]) 12/19/2019   • Iron deficiency anemia 12/20/2019   • Cellulitis of left hand 12/20/2019   • Polysubstance abuse ([facility]) 11/19/2021     Resolved Ambulatory Problems     Diagnosis Date Noted   • No Resolved Ambulatory Problems     Past Medical History:   Diagnosis Date   • Arthritis    • Herniated thoracic disc without myelopathy    • Narcotic dependence (CMS/[facility])    • Pneumonia    • Torn Achilles tendon          PAST SURGICAL HISTORY  Past Surgical History:   Procedure Laterality Date   • HIP SURGERY Right          FAMILY HISTORY  Family History   Problem Relation Age of Onset   • Cancer Mother    • Hypertension Mother          SOCIAL HISTORY  Social History     Socioeconomic History   • Marital status: Single   Tobacco Use   • Smoking status: Never Smoker   Substance and Sexual Activity   • Alcohol use: No   • Drug use: No         ALLERGIES  Patient has no known allergies.        REVIEW OF SYSTEMS  Review of Systems     All systems reviewed and negative except for those discussed in HPI.       PHYSICAL  EXAM    I have reviewed the triage vital signs and nursing notes.    ED Triage Vitals [11/19/21 0845]   Temp Heart Rate Resp BP SpO2   97.2 °F (36.2 °C) 103 18 139/82 97 %      Temp src Heart Rate Source Patient Position BP Location FiO2 (%)   -- -- -- -- --       Physical Exam    Physical Exam   Constitutional: No distress.  Chronically ill-appearing though not overtly toxic  HENT:  Head: Normocephalic and atraumatic.   Oropharynx: Mucous membranes are moist.   Eyes: No scleral icterus. No conjunctival pallor.  Neck: Painless range of motion noted. Neck supple.   Cardiovascular: Normal rate, regular rhythm and intact distal pulses.  Pulmonary/Chest: No respiratory distress. There are no wheezes, no rhonchi, and no rales.   Abdominal: Soft. There is moderate diffuse discomfort with palpation with focal tenderness suprapubic and left lower quadrant. There is no rebound and no guarding.   Musculoskeletal: Moves all extremities equally. There is no pedal edema or calf tenderness.  Healed surgical scars right forearm  Neurological: Alert.  Baseline strength and sensation noted.   Skin: Skin is pink, warm, and dry. No pallor.   Psychiatric: Mood and affect normal.   Nursing note and vitals reviewed.    LAB RESULTS  Recent Results (from the past 24 hour(s))   Comprehensive Metabolic Panel    Collection Time: 11/19/21  9:10 AM    Specimen: Blood   Result Value Ref Range    Glucose 104 (H) 65 - 99 mg/dL    BUN 9 8 - 23 mg/dL    Creatinine 0.68 (L) 0.76 - 1.27 mg/dL    Sodium 140 136 - 145 mmol/L    Potassium 4.0 3.5 - 5.2 mmol/L    Chloride 105 98 - 107 mmol/L    CO2 27.3 22.0 - 29.0 mmol/L    Calcium 8.5 (L) 8.6 - 10.5 mg/dL    Total Protein 7.3 6.0 - 8.5 g/dL    Albumin 3.90 3.50 - 5.20 g/dL    ALT (SGPT) 18 1 - 41 U/L    AST (SGOT) 19 1 - 40 U/L    Alkaline Phosphatase 108 39 - 117 U/L    Total Bilirubin 0.2 0.0 - 1.2 mg/dL    eGFR  African Amer 144 >60 mL/min/1.73    Globulin 3.4 gm/dL    A/G Ratio 1.1 g/dL     BUN/Creatinine Ratio 13.2 7.0 - 25.0    Anion Gap 7.7 5.0 - 15.0 mmol/L   Lipase    Collection Time: 11/19/21  9:10 AM    Specimen: Blood   Result Value Ref Range    Lipase 13 13 - 60 U/L   Type & Screen    Collection Time: 11/19/21  9:10 AM    Specimen: Blood   Result Value Ref Range    ABO Type O     RH type Positive     Antibody Screen Negative     T&S Expiration Date 11/22/2021 11:59:59 PM    CBC Auto Differential    Collection Time: 11/19/21  9:10 AM    Specimen: Blood   Result Value Ref Range    WBC 4.10 3.40 - 10.80 10*3/mm3    RBC 3.49 (L) 4.14 - 5.80 10*6/mm3    Hemoglobin 8.8 (L) 13.0 - 17.7 g/dL    Hematocrit 27.7 (L) 37.5 - 51.0 %    MCV 79.4 79.0 - 97.0 fL    MCH 25.2 (L) 26.6 - 33.0 pg    MCHC 31.8 31.5 - 35.7 g/dL    RDW 18.1 (H) 12.3 - 15.4 %    RDW-SD 51.7 37.0 - 54.0 fl    MPV 10.3 6.0 - 12.0 fL    Platelets 315 140 - 450 10*3/mm3    Neutrophil % 59.0 42.7 - 76.0 %    Lymphocyte % 24.4 19.6 - 45.3 %    Monocyte % 11.2 5.0 - 12.0 %    Eosinophil % 3.9 0.3 - 6.2 %    Basophil % 1.0 0.0 - 1.5 %    Immature Grans % 0.5 0.0 - 0.5 %    Neutrophils, Absolute 2.42 1.70 - 7.00 10*3/mm3    Lymphocytes, Absolute 1.00 0.70 - 3.10 10*3/mm3    Monocytes, Absolute 0.46 0.10 - 0.90 10*3/mm3    Eosinophils, Absolute 0.16 0.00 - 0.40 10*3/mm3    Basophils, Absolute 0.04 0.00 - 0.20 10*3/mm3    Immature Grans, Absolute 0.02 0.00 - 0.05 10*3/mm3    nRBC 0.0 0.0 - 0.2 /100 WBC   Hemoglobin A1c    Collection Time: 11/19/21  9:10 AM    Specimen: Blood   Result Value Ref Range    Hemoglobin A1C 6.00 (H) 4.80 - 5.60 %   COVID-19,[facility] IN-HOUSE CEPHEID/JOAQUÍN NP SWAB IN TRANSPORT MEDIA 8-12 HR TAT - Swab, Nasopharynx    Collection Time: 11/19/21 11:20 AM    Specimen: Nasopharynx; Swab   Result Value Ref Range    COVID19 Not Detected Not Detected - Ref. Range       Ordered the above labs and independently reviewed the results.        RADIOLOGY  CT Abdomen Pelvis With Contrast    Result Date: 11/19/2021  CT ABDOMEN AND PELVIS  WITH CONTRAST  HISTORY: Abdominal discomfort with blood-streaked stool.  TECHNIQUE: Axial CT images of the abdomen and pelvis were obtained following administration of intravenous contrast. The patient was not given oral contrast Coronal and sagittal reformats were obtained.  COMPARISON: 06/12/2014  FINDINGS: Circumferential wall thickening involving majority of the sigmoid colon with mucosal hyperenhancement is present. The findings are most suggestive of a colitis. No extraluminal air or fluid collection. No evidence of bowel obstruction. Moderate amount of formed stool is seen within the proximal colon. The appendix is normal.  There are patchy areas of airspace disease identified within the visualized lung fields particularly within the right lower lobe. Majority of these areas of ground glass density with accompanying interstitial thickening.  The liver demonstrates normal attenuation. There is a stable hypoattenuating left hepatic lobe lesion favored to represent a benign hemangioma. The gallbladder is normal. The spleen is normal. Pancreas is normal without ductal dilatation. Bilateral adrenal glands and kidneys are normal. The urinary bladder is partially distended with circumferential wall thickening. No pathological retroperitoneal lymphadenopathy.      1. CT findings of sigmoid colitis. 2. Areas of airspace disease within the visualized lower lung fields particularly severe within the right lower lobe. The patient has history of recent COVID pneumonia and is likely represent evolving sequela.  These findings were discussed with Dr. Ziggy Yeung by telephone at the time of dictation.  Radiation dose reduction techniques were utilized, including automated exposure control and exposure modulation based on body size.  This report was finalized on 11/19/2021 2:10 PM by Dr. Radha Delgado M.D.        I ordered the above noted radiological studies. Reviewed by me and discussed with radiologist.  See dictation for  official radiology interpretation.      PROCEDURES    Procedures        MEDICATIONS GIVEN IN ER    Medications   sodium chloride 0.9 % flush 10 mL (has no administration in time range)   ondansetron (ZOFRAN) injection 4 mg (has no administration in time range)   nitroglycerin (NITROSTAT) SL tablet 0.4 mg (has no administration in time range)   sodium chloride 0.9 % flush 10 mL (has no administration in time range)   sodium chloride 0.9 % flush 10 mL (has no administration in time range)   acetaminophen (TYLENOL) tablet 650 mg (has no administration in time range)     Or   acetaminophen (TYLENOL) 160 MG/5ML solution 650 mg (has no administration in time range)     Or   acetaminophen (TYLENOL) suppository 650 mg (has no administration in time range)   dextrose (GLUTOSE) oral gel 15 g (has no administration in time range)   dextrose (D50W) (25 g/50 mL) IV injection 25 g (has no administration in time range)   glucagon (human recombinant) (GLUCAGEN DIAGNOSTIC) injection 1 mg (has no administration in time range)   insulin lispro (ADMELOG) injection 0-9 Units (has no administration in time range)   HYDROmorphone (DILAUDID) injection 0.5 mg (0.5 mg Intravenous Given 11/19/21 0930)   iopamidol (ISOVUE-300) 61 % injection 85 mL (85 mL Intravenous Given by Other 11/19/21 1007)         PROGRESS, DATA ANALYSIS, CONSULTS, AND MEDICAL DECISION MAKING    My differential diagnosis for abdominal pain includes but is not limited to:  Gastritis, gastroenteritis, peptic ulcer disease, GERD, esophageal perforation, acute appendicitis, mesenteric adenitis, Meckel’s diverticulum, epiploic appendagitis, diverticulitis, colon cancer, ulcerative colitis, Crohn’s disease, intussusception, small bowel obstruction, adhesions, ischemic bowel, perforated viscus, ileus, obstipation, biliary colic, cholecystitis, cholelithiasis, Ian-Radnolph Xavier, hepatitis, pancreatitis, common bile duct obstruction, cholangitis, bile leak, splenic trauma, splenic  rupture, splenic infarction, splenic abscess, abdominal abscess, ascites, spontaneous bacterial peritonitis, hernia, UTI, cystitis, prostatitis, ureterolithiasis, urinary obstruction, AAA, myocardial infarction, pneumonia, cancer, porphyria, DKA, medications, sickle cell, viral syndrome, zoster        All labs have been independently reviewed by me.  All radiology studies have been reviewed by me and discussed with radiologist dictating the report.   EKG's independently viewed and interpreted by me.  Discussion below represents my analysis of pertinent findings related to patient's condition, differential diagnosis, treatment plan and final disposition.      ED Course as of 11/19/21 1555   Fri Nov 19, 2021   0928 Hemoglobin(!): 8.8  Stable chronic anemia [RS]   0928 Platelets: 315 [RS]   0928 WBC: 4.10 [RS]   1059 Patient resting more comfortably at this time.  I have updated him with his lab results and CT result.  He reports he has never had a colonoscopy.  Plan admission for further evaluation and treatment.  Patient agreeable. [RS]   1118 CONSULT        Provider: Dr. Nichole - [facility]    Discussion: Reviewed patient history, ED presentation and evaluation.  Agreeable to accept patient for observation admission with telemetry.  He is aware of the pending GI consult.    Agreeable c treatment and planned disposition.       [RS]      ED Course User Index  [RS] Ziggy Yeung MD       AS OF 15:55 EST VITALS:    BP - 139/82  HR - 70  TEMP - 97.2 °F (36.2 °C)  O2 SATS - 99%        DIAGNOSIS  Final diagnoses:   Acute colitis   Bright red blood per rectum   Chronic anemia   Chronic anticoagulation   Chronic hypoxemic respiratory failure (HCC)   History of 2019 novel coronavirus disease (COVID-19)         DISPOSITION  ADMISSION    Discussed treatment plan and reason for admission with pt/family and admitting physician.  Pt/family voiced understanding of the plan for admission for further testing/treatment as needed.           Ziggy Yeung MD  11/19/21 5437

## 2021-11-19 NOTE — ED NOTES
Attempted report, receiving RN unavailable.  Will re-attempt in 10 minutes.     Dima Gloria, RN  11/19/21 1021

## 2021-11-19 NOTE — ED TRIAGE NOTES
Pt arrived via ems from home with complaints of bloody stool x1wk.  Pt reports he had Covid in October and reports he has still felt SOA.  Pt reports he is on Eliquis. Pt wears home O2 3LNC all the time.     Pt was wearing a mask during assessment.  This RN wore appropriate PPE

## 2021-11-19 NOTE — PLAN OF CARE
Problem: Adult Inpatient Plan of Care  Goal: Plan of Care Review  Outcome: Ongoing, Progressing  Flowsheets (Taken 11/19/2021 1610)  Progress: no change  Plan of Care Reviewed With: patient  Outcome Summary: vss, new admit from er, no falls, c/o pain, continue to monitor  Goal: Patient-Specific Goal (Individualized)  Outcome: Ongoing, Progressing  Goal: Absence of Hospital-Acquired Illness or Injury  Outcome: Ongoing, Progressing  Goal: Optimal Comfort and Wellbeing  Outcome: Ongoing, Progressing  Goal: Readiness for Transition of Care  Outcome: Ongoing, Progressing     Problem: Adult Inpatient Plan of Care  Goal: Plan of Care Review  Outcome: Ongoing, Progressing  Flowsheets (Taken 11/19/2021 1610)  Progress: no change  Plan of Care Reviewed With: patient  Outcome Summary: vss, new admit from er, no falls, c/o pain, continue to monitor  Goal: Patient-Specific Goal (Individualized)  Outcome: Ongoing, Progressing  Goal: Absence of Hospital-Acquired Illness or Injury  Outcome: Ongoing, Progressing  Goal: Optimal Comfort and Wellbeing  Outcome: Ongoing, Progressing  Goal: Readiness for Transition of Care  Outcome: Ongoing, Progressing   Goal Outcome Evaluation:  Plan of Care Reviewed With: patient        Progress: no change  Outcome Summary: vss, new admit from er, no falls, c/o pain, continue to monitor

## 2021-11-19 NOTE — CONSULTS
Gastroenterology   Initial Inpatient Consult Note  Thank you for asking our opinion on this patient.  Referring Provider: Jeb Nichole MD    Reason for Consultation: Colitis    Subjective     History of present illness:    61 y.o. male who we are asked to see for about 4- 5 days of rectal bleeding.  He has had some loose stools as well.  He describes some abdominal pain primarily left lower quadrant which is mild, nonradiating and not really associated with his bowel movements.  He thinks that perhaps the diarrhea is worse with eating.  CT in the ER shows evidence of segmental sigmoid colitis.  His white count is normal.  Has had a recent COVID-19 infection and was discharged recently from the hospital on Eliquis due to elevated D-dimer and he has had C. difficile in the past as well.    Past Medical History:  Past Medical History:   Diagnosis Date   • Arthritis    • Chronic pain    • Diabetes mellitus (CMS/HCC)    • Herniated thoracic disc without myelopathy     by right scapula   • Hypertension    • Narcotic dependence (CMS/HCC)    • Pneumonia    • PTSD (post-traumatic stress disorder)    • Torn Achilles tendon      Past Surgical History:  Past Surgical History:   Procedure Laterality Date   • HIP SURGERY Right       Social History:   Social History     Tobacco Use   • Smoking status: Never Smoker   • Smokeless tobacco: Not on file   Substance Use Topics   • Alcohol use: No      Family History:  Family History   Problem Relation Age of Onset   • Cancer Mother    • Hypertension Mother        Home Meds:  (Not in a hospital admission)    Current Meds:   insulin lispro, 0-9 Units, Subcutaneous, 4x Daily With Meals & Nightly  sodium chloride, 10 mL, Intravenous, Q12H      Allergies:  No Known Allergies  Review of Systems  Pertinent items are noted in HPI, all other systems reviewed and negative    Objective     Vital Signs  Temp:  [97.2 °F (36.2 °C)] 97.2 °F (36.2 °C)  Heart Rate:  [103] 103  Resp:  [18] 18  BP:  (139)/(82) 139/82    Physical Exam:  CONSTITUTIONAL:  today's vital signs reviewed  EARS NOSE THROAT: trachea midline and no deformity of the nares  EYES: no scleral icterus  GASTROINTESTINAL: abdomen is soft, nontender, nondistended with normal active bowel sounds, no masses are appreciated  PSYCHIATRIC: appropriate mood and affect  RESPIRATORY: normal inspiratory effort with no increased work of breathing  NEUROLOGIC: patient is awake and alert  DERMATOLOGIC: skin is warm with no cyanosis  LYMPHATIC: no periumbilical lymphadenopathy     Results Review:   I reviewed the patient's new clinical results.    Results from last 7 days   Lab Units 11/19/21  0910   WBC 10*3/mm3 4.10   HEMOGLOBIN g/dL 8.8*   HEMATOCRIT % 27.7*   PLATELETS 10*3/mm3 315     Results from last 7 days   Lab Units 11/19/21  0910   SODIUM mmol/L 140   POTASSIUM mmol/L 4.0   CHLORIDE mmol/L 105   CO2 mmol/L 27.3   BUN mg/dL 9   CREATININE mg/dL 0.68*   CALCIUM mg/dL 8.5*   BILIRUBIN mg/dL 0.2   ALK PHOS U/L 108   ALT (SGPT) U/L 18   AST (SGOT) U/L 19   GLUCOSE mg/dL 104*         Lab Results   Lab Value Date/Time    LIPASE 13 11/19/2021 0910    LIPASE 39 04/07/2021 1524    LIPASE 52 10/11/2020 0822    LIPASE 103 08/13/2020 0445    LIPASE 199 07/19/2020 1002    LIPASE 17 06/12/2014 1730       Radiology:  CT Abdomen Pelvis With Contrast   Preliminary Result   1. CT findings of sigmoid colitis.   2. Areas of airspace disease within the visualized lower lung fields   particularly severe within the right lower lobe. The patient has history   of recent COVID pneumonia and is likely represent evolving sequela.       These findings were discussed with Dr. Ziggy Yeung by telephone at the   time of dictation.       Radiation dose reduction techniques were utilized, including automated   exposure control and exposure modulation based on body size.                  Assessment/Plan   Patient Active Problem List   Diagnosis   • Chest pain at rest   • Chronic pain    • Diabetes mellitus (HCC)   • Hypertension   • DDD (degenerative disc disease), lumbar   • Pulmonary nodule, needs follow up PET scan as outpatient   • Drug-seeking behavior   • Depression with suicidal ideation   • History of methicillin resistant staphylococcus aureus (MRSA)   • PTSD (post-traumatic stress disorder)   • Opioid use disorder, moderate, in sustained remission (HCC)   • Iron deficiency anemia   • Cellulitis of left hand   • Acute colitis   • Hematochezia   • Anticoagulated   • Polysubstance abuse (HCC)   • Methadone dependence (HCC)   • History of COVID-19       Assessment:  1. Segmental sigmoid colitis.  Patient seems to have a limited sigmoid colitis.  Question whether this could be possibly ischemic versus infectious.  Certainly the Eliquis is increasing the bleeding risk with any mucosal inflammation.  No leukocytosis  2. History of elevated D-dimer on Eliquis  3. Anemia.  Patient's hemoglobin is slightly below baseline but no significant change from 2 weeks ago when it was 9.0 and now it is 8.8.    Plan:  · Await C. difficile toxin and stool PCR  · Monitor H&H  · Further plans for possible endoscopic evaluation with colonoscopy pending clinical course      I discussed the patients findings and my recommendations with patient and nursing staff.    MD Jesse Puri M.D.  [facility] [place] [address]  Office: [phone]

## 2021-11-20 PROBLEM — A04.72 C. DIFFICILE COLITIS: Status: ACTIVE | Noted: 2021-11-20

## 2021-11-20 LAB
ANION GAP SERPL CALCULATED.3IONS-SCNC: 7.7 MMOL/L (ref 5–15)
BUN SERPL-MCNC: 4 MG/DL (ref 8–23)
BUN/CREAT SERPL: 6.5 (ref 7–25)
C DIFF TOX GENS STL QL NAA+PROBE: POSITIVE
CALCIUM SPEC-SCNC: 8.2 MG/DL (ref 8.6–10.5)
CHLORIDE SERPL-SCNC: 104 MMOL/L (ref 98–107)
CO2 SERPL-SCNC: 25.3 MMOL/L (ref 22–29)
CREAT SERPL-MCNC: 0.62 MG/DL (ref 0.76–1.27)
D DIMER PPP FEU-MCNC: 1.66 MCGFEU/ML (ref 0–0.49)
DEPRECATED RDW RBC AUTO: 51.5 FL (ref 37–54)
ERYTHROCYTE [DISTWIDTH] IN BLOOD BY AUTOMATED COUNT: 17.9 % (ref 12.3–15.4)
FERRITIN SERPL-MCNC: 20.2 NG/ML (ref 30–400)
FOLATE SERPL-MCNC: 4.32 NG/ML (ref 4.78–24.2)
GFR SERPL CREATININE-BSD FRML MDRD: >150 ML/MIN/1.73
GLUCOSE BLDC GLUCOMTR-MCNC: 103 MG/DL (ref 70–130)
GLUCOSE BLDC GLUCOMTR-MCNC: 108 MG/DL (ref 70–130)
GLUCOSE BLDC GLUCOMTR-MCNC: 121 MG/DL (ref 70–130)
GLUCOSE BLDC GLUCOMTR-MCNC: 133 MG/DL (ref 70–130)
GLUCOSE SERPL-MCNC: 125 MG/DL (ref 65–99)
HCT VFR BLD AUTO: 25.2 % (ref 37.5–51)
HCT VFR BLD AUTO: 25.2 % (ref 37.5–51)
HCT VFR BLD AUTO: 30.1 % (ref 37.5–51)
HGB BLD-MCNC: 7.8 G/DL (ref 13–17.7)
HGB BLD-MCNC: 7.8 G/DL (ref 13–17.7)
HGB BLD-MCNC: 9.3 G/DL (ref 13–17.7)
INR PPP: 1.18 (ref 0.9–1.1)
IRON 24H UR-MRATE: 22 MCG/DL (ref 59–158)
IRON SATN MFR SERPL: 7 % (ref 20–50)
MCH RBC QN AUTO: 24.6 PG (ref 26.6–33)
MCHC RBC AUTO-ENTMCNC: 31 G/DL (ref 31.5–35.7)
MCV RBC AUTO: 79.5 FL (ref 79–97)
PLATELET # BLD AUTO: 319 10*3/MM3 (ref 140–450)
PMV BLD AUTO: 10.4 FL (ref 6–12)
POTASSIUM SERPL-SCNC: 3.7 MMOL/L (ref 3.5–5.2)
PROTHROMBIN TIME: 14.8 SECONDS (ref 11.7–14.2)
RBC # BLD AUTO: 3.17 10*6/MM3 (ref 4.14–5.8)
RETICS # AUTO: 0.06 10*6/MM3 (ref 0.02–0.13)
RETICS/RBC NFR AUTO: 1.83 % (ref 0.7–1.9)
SODIUM SERPL-SCNC: 137 MMOL/L (ref 136–145)
TIBC SERPL-MCNC: 338 MCG/DL (ref 298–536)
TRANSFERRIN SERPL-MCNC: 227 MG/DL (ref 200–360)
VIT B12 BLD-MCNC: 751 PG/ML (ref 211–946)
WBC NRBC COR # BLD: 2.96 10*3/MM3 (ref 3.4–10.8)

## 2021-11-20 PROCEDURE — 82962 GLUCOSE BLOOD TEST: CPT

## 2021-11-20 PROCEDURE — 85027 COMPLETE CBC AUTOMATED: CPT | Performed by: NURSE PRACTITIONER

## 2021-11-20 PROCEDURE — 85045 AUTOMATED RETICULOCYTE COUNT: CPT | Performed by: INTERNAL MEDICINE

## 2021-11-20 PROCEDURE — 84466 ASSAY OF TRANSFERRIN: CPT | Performed by: INTERNAL MEDICINE

## 2021-11-20 PROCEDURE — 82746 ASSAY OF FOLIC ACID SERUM: CPT | Performed by: INTERNAL MEDICINE

## 2021-11-20 PROCEDURE — 80048 BASIC METABOLIC PNL TOTAL CA: CPT | Performed by: NURSE PRACTITIONER

## 2021-11-20 PROCEDURE — 25010000002 IRON SUCROSE PER 1 MG: Performed by: INTERNAL MEDICINE

## 2021-11-20 PROCEDURE — 63710000001 INSULIN GLARGINE PER 5 UNITS: Performed by: NURSE PRACTITIONER

## 2021-11-20 PROCEDURE — 87493 C DIFF AMPLIFIED PROBE: CPT | Performed by: NURSE PRACTITIONER

## 2021-11-20 PROCEDURE — 85610 PROTHROMBIN TIME: CPT | Performed by: NURSE PRACTITIONER

## 2021-11-20 PROCEDURE — 85379 FIBRIN DEGRADATION QUANT: CPT | Performed by: NURSE PRACTITIONER

## 2021-11-20 PROCEDURE — 82607 VITAMIN B-12: CPT | Performed by: INTERNAL MEDICINE

## 2021-11-20 PROCEDURE — 85014 HEMATOCRIT: CPT | Performed by: NURSE PRACTITIONER

## 2021-11-20 PROCEDURE — 99232 SBSQ HOSP IP/OBS MODERATE 35: CPT | Performed by: INTERNAL MEDICINE

## 2021-11-20 PROCEDURE — 85018 HEMOGLOBIN: CPT | Performed by: NURSE PRACTITIONER

## 2021-11-20 PROCEDURE — 82728 ASSAY OF FERRITIN: CPT | Performed by: INTERNAL MEDICINE

## 2021-11-20 PROCEDURE — 83540 ASSAY OF IRON: CPT | Performed by: INTERNAL MEDICINE

## 2021-11-20 RX ORDER — OXYCODONE HYDROCHLORIDE AND ACETAMINOPHEN 5; 325 MG/1; MG/1
1 TABLET ORAL EVERY 6 HOURS PRN
Status: DISCONTINUED | OUTPATIENT
Start: 2021-11-20 | End: 2021-11-23 | Stop reason: HOSPADM

## 2021-11-20 RX ORDER — VANCOMYCIN HYDROCHLORIDE 125 MG/1
125 CAPSULE ORAL EVERY 6 HOURS SCHEDULED
Status: DISCONTINUED | OUTPATIENT
Start: 2021-11-20 | End: 2021-11-23 | Stop reason: HOSPADM

## 2021-11-20 RX ORDER — SODIUM CHLORIDE 9 MG/ML
100 INJECTION, SOLUTION INTRAVENOUS CONTINUOUS
Status: DISCONTINUED | OUTPATIENT
Start: 2021-11-20 | End: 2021-11-22

## 2021-11-20 RX ORDER — HYDROCODONE BITARTRATE AND ACETAMINOPHEN 5; 325 MG/1; MG/1
1 TABLET ORAL EVERY 6 HOURS PRN
Status: DISCONTINUED | OUTPATIENT
Start: 2021-11-20 | End: 2021-11-20

## 2021-11-20 RX ADMIN — VANCOMYCIN HYDROCHLORIDE 125 MG: 125 CAPSULE ORAL at 23:45

## 2021-11-20 RX ADMIN — CLONAZEPAM 0.5 MG: 0.5 TABLET ORAL at 00:39

## 2021-11-20 RX ADMIN — IRON SUCROSE 300 MG: 20 INJECTION, SOLUTION INTRAVENOUS at 14:45

## 2021-11-20 RX ADMIN — FUROSEMIDE 40 MG: 40 TABLET ORAL at 08:33

## 2021-11-20 RX ADMIN — CARVEDILOL 3.12 MG: 3.12 TABLET, FILM COATED ORAL at 00:39

## 2021-11-20 RX ADMIN — CLONIDINE HYDROCHLORIDE 0.1 MG: 0.1 TABLET ORAL at 08:34

## 2021-11-20 RX ADMIN — HYDROCODONE BITARTRATE AND ACETAMINOPHEN 1 TABLET: 5; 325 TABLET ORAL at 09:57

## 2021-11-20 RX ADMIN — INSULIN GLARGINE 10 UNITS: 100 INJECTION, SOLUTION SUBCUTANEOUS at 08:40

## 2021-11-20 RX ADMIN — SODIUM CHLORIDE, PRESERVATIVE FREE 10 ML: 5 INJECTION INTRAVENOUS at 21:25

## 2021-11-20 RX ADMIN — SODIUM CHLORIDE, PRESERVATIVE FREE 10 ML: 5 INJECTION INTRAVENOUS at 08:35

## 2021-11-20 RX ADMIN — DULOXETINE HYDROCHLORIDE 30 MG: 30 CAPSULE, DELAYED RELEASE ORAL at 08:34

## 2021-11-20 RX ADMIN — CLONAZEPAM 0.5 MG: 0.5 TABLET ORAL at 21:24

## 2021-11-20 RX ADMIN — VANCOMYCIN HYDROCHLORIDE 125 MG: 125 CAPSULE ORAL at 18:21

## 2021-11-20 RX ADMIN — VANCOMYCIN HYDROCHLORIDE 125 MG: 125 CAPSULE ORAL at 12:09

## 2021-11-20 RX ADMIN — CLONIDINE HYDROCHLORIDE 0.1 MG: 0.1 TABLET ORAL at 00:39

## 2021-11-20 RX ADMIN — OXYCODONE AND ACETAMINOPHEN 1 TABLET: 5; 325 TABLET ORAL at 18:21

## 2021-11-20 RX ADMIN — CARVEDILOL 3.12 MG: 3.12 TABLET, FILM COATED ORAL at 08:34

## 2021-11-20 RX ADMIN — SODIUM CHLORIDE 100 ML/HR: 9 INJECTION, SOLUTION INTRAVENOUS at 12:10

## 2021-11-20 RX ADMIN — CARVEDILOL 3.12 MG: 3.12 TABLET, FILM COATED ORAL at 21:24

## 2021-11-20 RX ADMIN — PRAVASTATIN SODIUM 40 MG: 40 TABLET ORAL at 08:34

## 2021-11-20 RX ADMIN — CLONIDINE HYDROCHLORIDE 0.1 MG: 0.1 TABLET ORAL at 21:24

## 2021-11-20 NOTE — PROGRESS NOTES
Pharmacy consulted for [facility] CLOSTRIDIUM DIFFICILE INFECTION (CDI) TREATMENT    -Kwame's medications were reviewed and I see no clinically significant offending agents that could have contributed to C diff   -Agree with current dose of PO vanc as he doesn't have any guideline concordant signs that would suggest fulminant case of C diff.   -If Kwame becomes hypotensive, I would recommend increasing dose of PO vanc to 500 mg q6h for remainder of course.    Thanks for involving me in Kwame's case,    Parth Starkey, PharmD, [facility]  Clinical Infectious Diseases Pharmacy Specialist   [phone]

## 2021-11-20 NOTE — PROGRESS NOTES
"Pharmacy Consult    Pharmacy consulted to \"Change proton pump inhibitors (PPIs) to famotidine unless documented or history of GI bleed or to discontinue antiperistalic agents and stool softeners/laxatives.\".  Medications reviewed - no changes made.    Pharmacy will continue to follow.  Sim Garza PharmD  "

## 2021-11-20 NOTE — PROGRESS NOTES
" LOS: 0 days     Name: Kwame Andres  Age: 61 y.o.  Sex: male  :  1960  MRN: [id number]         Primary Care Physician: Provider, No Known    Subjective   Subjective  Patient complains of generalized abdominal pain.  Still having loose bowel movements that are nonbloody.  Denies fevers or chills.  C. difficile test today has returned positive.      Objective   Vital Signs  Temp:  [98.2 °F (36.8 °C)-99.1 °F (37.3 °C)] 98.2 °F (36.8 °C)  Heart Rate:  [70-81] 79  Resp:  [17-18] 18  BP: (114-133)/(69-94) 114/69  Body mass index is 29.94 kg/m².    Objective:  General Appearance:  Comfortable and in no acute distress.    Vital signs: (most recent): Blood pressure 114/69, pulse 79, temperature 98.2 °F (36.8 °C), temperature source Oral, resp. rate 18, height 188 cm (74\"), weight 106 kg (233 lb 3.2 oz), SpO2 98 %.    Lungs:  Normal effort and normal respiratory rate.    Heart: Normal rate.  Regular rhythm.    Abdomen: Abdomen is soft.  Bowel sounds are normal.   There is no abdominal tenderness.     Extremities: There is no dependent edema or local swelling.    Neurological: Patient is alert and oriented to person, place and time.    Skin:  Warm and dry.              Results Review:       I reviewed the patient's new clinical results.    Results from last 7 days   Lab Units 21  0751 21  0910   WBC 10*3/mm3 2.96*  --  4.10   HEMOGLOBIN g/dL 7.8*  7.8* 9.0* 8.8*   PLATELETS 10*3/mm3 319  --  315     Results from last 7 days   Lab Units 21  0751 21  0910   SODIUM mmol/L 137 140   POTASSIUM mmol/L 3.7 4.0   CHLORIDE mmol/L 104 105   CO2 mmol/L 25.3 27.3   BUN mg/dL 4* 9   CREATININE mg/dL 0.62* 0.68*   CALCIUM mg/dL 8.2* 8.5*   GLUCOSE mg/dL 125* 104*     Results from last 7 days   Lab Units 21  0751   INR  1.18*             Scheduled Meds:   carvedilol, 3.125 mg, Oral, BID With Meals  cloNIDine, 0.1 mg, Oral, TID  DULoxetine, 30 mg, Oral, Daily  insulin glargine, 10 Units, " Subcutaneous, Daily  insulin lispro, 0-9 Units, Subcutaneous, 4x Daily With Meals & Nightly  iron sucrose, 300 mg, Intravenous, Q24H  pravastatin, 40 mg, Oral, Daily  sodium chloride, 10 mL, Intravenous, Q12H  vancomycin, 125 mg, Oral, Q6H      PRN Meds:   •  acetaminophen **OR** acetaminophen **OR** acetaminophen  •  clonazePAM  •  dextrose  •  dextrose  •  glucagon (human recombinant)  •  nitroglycerin  •  ondansetron  •  oxyCODONE-acetaminophen  •  Pharmacy Consult  •  [COMPLETED] Insert peripheral IV **AND** sodium chloride  •  sodium chloride  Continuous Infusions:  Pharmacy Consult,   sodium chloride, 100 mL/hr, Last Rate: 100 mL/hr (11/20/21 1210)        Assessment/Plan   Active Hospital Problems    Diagnosis  POA   • **C. difficile colitis [A04.72]  Unknown   • Acute colitis [K52.9]  Yes   • Hematochezia [K92.1]  Yes   • Anticoagulated [Z79.01]  Not Applicable   • Methadone dependence (HCC) [F11.20]  Yes   • History of COVID-19 [Z86.16]  Unknown   • Iron deficiency anemia [D50.9]  Yes   • History of methicillin resistant staphylococcus aureus (MRSA) [Z86.14]  Yes   • PTSD (post-traumatic stress disorder) [F43.10]  Yes   • Hypertension [I10]  Yes      Resolved Hospital Problems   No resolved problems to display.       Assessment & Plan    -Start oral vancomycin  -He is iron deficient.  We will start Venofer.  Monitor hemoglobin and transfuse if drops below 7  -D-dimer has trended down since recent hospitalization at [place] for COVID-19.  There he had no evidence of DVT or PE.  I do not see any indication for ongoing anticoagulation and this would be contraindicated in the setting of hematochezia  -Continue supportive care with IV fluids  -Continue clonazepam.  He states that Norco upsets his stomach and requests switch to Percocet.  He is asking if I will restart his methadone.  However, he does not have an active outpatient prescription for this and has been taking some from a family member.  He will not  receive any methadone while he is here nor a prescription for any controlled substances upon discharge.  I encouraged him to find a primary care physician.  -Chronic hypoxic respiratory failure is at baseline status post COVID-19 pneumonia      I wore full protective equipment throughout the patient encounter including eye protection and facemask.  Hand hygiene was performed before donning protective equipment and after removal when leaving the room.    Jeb Nichole MD  [facility]  11/20/21  12:46 EST

## 2021-11-20 NOTE — PROGRESS NOTES
[facility]  Inpatient Progress Note    Reason for Follow Up: Colitis, rectal bleeding    Subjective     Interval History:   Still having diarrhea.  His GI PCR was negative.  Unfortunately C. difficile was not collected at the same time and his nurse is collected at this morning.  He is still having episodes of abdominal tenderness and explosive diarrhea.  Bowel movement that he just had was nonbloody but he is still seeing specks of blood intermittently.    Current Facility-Administered Medications:   •  acetaminophen (TYLENOL) tablet 650 mg, 650 mg, Oral, Q4H PRN **OR** acetaminophen (TYLENOL) 160 MG/5ML solution 650 mg, 650 mg, Oral, Q4H PRN **OR** acetaminophen (TYLENOL) suppository 650 mg, 650 mg, Rectal, Q4H PRN, Ana Galeas APRN  •  carvedilol (COREG) tablet 3.125 mg, 3.125 mg, Oral, BID With Meals, Usman Stratton APRN, 3.125 mg at 11/20/21 0039  •  clonazePAM (KlonoPIN) tablet 0.5 mg, 0.5 mg, Oral, BID PRN, Usman Stratton APRN, 0.5 mg at 11/20/21 0039  •  cloNIDine (CATAPRES) tablet 0.1 mg, 0.1 mg, Oral, TID, Usman Stratton APRN, 0.1 mg at 11/20/21 0039  •  dextrose (D50W) (25 g/50 mL) IV injection 25 g, 25 g, Intravenous, Q15 Min PRN, Ana Galeas APRN  •  dextrose (GLUTOSE) oral gel 15 g, 15 g, Oral, Q15 Min PRN, Ana Galeas APRN  •  DULoxetine (CYMBALTA) DR capsule 30 mg, 30 mg, Oral, Daily, Usman Stratton APRN  •  furosemide (LASIX) tablet 40 mg, 40 mg, Oral, Daily, Usman Stratton APRN  •  glucagon (human recombinant) (GLUCAGEN DIAGNOSTIC) injection 1 mg, 1 mg, Subcutaneous, Q15 Min PRN, Ana Galeas APRN  •  insulin glargine (LANTUS, SEMGLEE) injection 10 Units, 10 Units, Subcutaneous, Daily, Usman Stratton, APRN  •  insulin lispro (ADMELOG) injection 0-9 Units, 0-9 Units, Subcutaneous, 4x Daily With Meals & Nightly, Ana Galeas, APRN  •  nitroglycerin (NITROSTAT) SL tablet 0.4 mg, 0.4 mg, Sublingual, Q5  Min PRN, Ana Galeas APRN  •  ondansetron (ZOFRAN) injection 4 mg, 4 mg, Intravenous, Q6H PRN, Ana Galeas APRN  •  pravastatin (PRAVACHOL) tablet 40 mg, 40 mg, Oral, Daily, Usman Stratton, GRZEGORZ  •  [COMPLETED] Insert peripheral IV, , , Once **AND** sodium chloride 0.9 % flush 10 mL, 10 mL, Intravenous, PRN, Ziggy Yeung MD  •  sodium chloride 0.9 % flush 10 mL, 10 mL, Intravenous, Q12H, Ana Galeas APRN, 10 mL at 11/19/21 2309  •  sodium chloride 0.9 % flush 10 mL, 10 mL, Intravenous, PRN, Ana Galeas APRN  Review of Systems:   All systems reviewed and negative except for: GI: Diarrhea, abdominal tenderness    Objective     Vital Signs  Temp:  [97.2 °F (36.2 °C)-99.1 °F (37.3 °C)] 98.2 °F (36.8 °C)  Heart Rate:  [] 79  Resp:  [17-18] 18  BP: (114-139)/(69-94) 114/69  Body mass index is 29.94 kg/m².    Intake/Output Summary (Last 24 hours) at 11/20/2021 0829  Last data filed at 11/20/2021 0613  Gross per 24 hour   Intake --   Output 1250 ml   Net -1250 ml     No intake/output data recorded.     Physical Exam:   General: patient awake, alert and cooperative   Eyes: Normal lids and lashes, no scleral icterus   Neck: supple, normal ROM   Skin: warm and dry, not jaundiced   Cardiovascular: regular rhythm and rate, no murmurs auscultated   Pulm: clear to auscultation bilaterally, regular and unlabored   Abdomen: soft, obese, tender and a little distended; normal bowel sounds   Rectal: deferred   Extremities: no rash or edema   Psychiatric: Normal mood and behavior; memory intact     Results Review:     I reviewed the patient's new clinical results.    Results from last 7 days   Lab Units 11/20/21  0751 11/19/21 2026 11/19/21  0910   WBC 10*3/mm3 2.96*  --  4.10   HEMOGLOBIN g/dL 7.8*  7.8* 9.0* 8.8*   HEMATOCRIT % 25.2*  25.2* 29.3* 27.7*   PLATELETS 10*3/mm3 319  --  315     Results from last 7 days   Lab Units 11/19/21  0910   SODIUM mmol/L 140   POTASSIUM mmol/L 4.0    CHLORIDE mmol/L 105   CO2 mmol/L 27.3   BUN mg/dL 9   CREATININE mg/dL 0.68*   CALCIUM mg/dL 8.5*   BILIRUBIN mg/dL 0.2   ALK PHOS U/L 108   ALT (SGPT) U/L 18   AST (SGOT) U/L 19   GLUCOSE mg/dL 104*         Lab Results   Lab Value Date/Time    LIPASE 13 11/19/2021 0910    LIPASE 39 04/07/2021 1524    LIPASE 52 10/11/2020 0822    LIPASE 103 08/13/2020 0445    LIPASE 199 07/19/2020 1002    LIPASE 17 06/12/2014 1730       Radiology:  CT Abdomen Pelvis With Contrast   Final Result   1. CT findings of sigmoid colitis.   2. Areas of airspace disease within the visualized lower lung fields   particularly severe within the right lower lobe. The patient has history   of recent COVID pneumonia and is likely represent evolving sequela.       These findings were discussed with Dr. Ziggy Yeung by telephone at the   time of dictation.       Radiation dose reduction techniques were utilized, including automated   exposure control and exposure modulation based on body size.       This report was finalized on 11/19/2021 2:10 PM by Dr. Radha Delgado M.D.              Assessment/Plan     Patient Active Problem List   Diagnosis   • Chest pain at rest   • Chronic pain   • Diabetes mellitus (HCC)   • Hypertension   • DDD (degenerative disc disease), lumbar   • Pulmonary nodule, needs follow up PET scan as outpatient   • Drug-seeking behavior   • Depression with suicidal ideation   • History of methicillin resistant staphylococcus aureus (MRSA)   • PTSD (post-traumatic stress disorder)   • Opioid use disorder, moderate, in sustained remission (HCC)   • Iron deficiency anemia   • Cellulitis of left hand   • Acute colitis   • Hematochezia   • Anticoagulated   • Polysubstance abuse (HCC)   • Methadone dependence (HCC)   • History of COVID-19       Impression  1.  Segmental colitis: GI PCR is negative, C diff just collected    2.  Rectal bleeding: With above, he was also recently on Eliquis    3.  Recent anticoagulation    4.  Acute on  chronic anemia    Plan  Follow-up stool study results  If he does not have C. difficile, recommend unprepped flex sig for tissue samples  Continue supportive care  Continue off anticoagulation unless there is another medical necessity  Follow hemoglobin  Monitor for further bleeding    I discussed the patients findings and my recommendations with patient and nursing staff.    All necessary PPE, including face mask and eye protection, were worn during this encounter.  Hand sanitization was performed both before and after the patient interaction.    Over 25 minutes was spent reviewing the patient's chart and records, in discussion with the patient, in examination of the patient, and in discussion with members of the patient's medical team.    Candi Lange MD

## 2021-11-21 LAB
ANION GAP SERPL CALCULATED.3IONS-SCNC: 9 MMOL/L (ref 5–15)
BUN SERPL-MCNC: 6 MG/DL (ref 8–23)
BUN/CREAT SERPL: 8.2 (ref 7–25)
CALCIUM SPEC-SCNC: 7.8 MG/DL (ref 8.6–10.5)
CHLORIDE SERPL-SCNC: 107 MMOL/L (ref 98–107)
CO2 SERPL-SCNC: 24 MMOL/L (ref 22–29)
CREAT SERPL-MCNC: 0.73 MG/DL (ref 0.76–1.27)
DEPRECATED RDW RBC AUTO: 51.6 FL (ref 37–54)
ERYTHROCYTE [DISTWIDTH] IN BLOOD BY AUTOMATED COUNT: 17.8 % (ref 12.3–15.4)
GFR SERPL CREATININE-BSD FRML MDRD: 132 ML/MIN/1.73
GLUCOSE BLDC GLUCOMTR-MCNC: 100 MG/DL (ref 70–130)
GLUCOSE BLDC GLUCOMTR-MCNC: 101 MG/DL (ref 70–130)
GLUCOSE BLDC GLUCOMTR-MCNC: 80 MG/DL (ref 70–130)
GLUCOSE SERPL-MCNC: 140 MG/DL (ref 65–99)
HCT VFR BLD AUTO: 26.7 % (ref 37.5–51)
HCT VFR BLD AUTO: 31.6 % (ref 37.5–51)
HGB BLD-MCNC: 8.1 G/DL (ref 13–17.7)
HGB BLD-MCNC: 9.8 G/DL (ref 13–17.7)
MCH RBC QN AUTO: 24.5 PG (ref 26.6–33)
MCHC RBC AUTO-ENTMCNC: 30.3 G/DL (ref 31.5–35.7)
MCV RBC AUTO: 80.9 FL (ref 79–97)
PLATELET # BLD AUTO: 282 10*3/MM3 (ref 140–450)
PMV BLD AUTO: 11.5 FL (ref 6–12)
POTASSIUM SERPL-SCNC: 4.4 MMOL/L (ref 3.5–5.2)
RBC # BLD AUTO: 3.3 10*6/MM3 (ref 4.14–5.8)
SODIUM SERPL-SCNC: 140 MMOL/L (ref 136–145)
WBC NRBC COR # BLD: 3.48 10*3/MM3 (ref 3.4–10.8)

## 2021-11-21 PROCEDURE — 80048 BASIC METABOLIC PNL TOTAL CA: CPT | Performed by: INTERNAL MEDICINE

## 2021-11-21 PROCEDURE — 85027 COMPLETE CBC AUTOMATED: CPT | Performed by: INTERNAL MEDICINE

## 2021-11-21 PROCEDURE — 85014 HEMATOCRIT: CPT | Performed by: NURSE PRACTITIONER

## 2021-11-21 PROCEDURE — 36415 COLL VENOUS BLD VENIPUNCTURE: CPT | Performed by: NURSE PRACTITIONER

## 2021-11-21 PROCEDURE — 63710000001 INSULIN GLARGINE PER 5 UNITS: Performed by: NURSE PRACTITIONER

## 2021-11-21 PROCEDURE — 85018 HEMOGLOBIN: CPT | Performed by: NURSE PRACTITIONER

## 2021-11-21 PROCEDURE — 99232 SBSQ HOSP IP/OBS MODERATE 35: CPT | Performed by: INTERNAL MEDICINE

## 2021-11-21 PROCEDURE — 82962 GLUCOSE BLOOD TEST: CPT

## 2021-11-21 PROCEDURE — 25010000002 IRON SUCROSE PER 1 MG: Performed by: INTERNAL MEDICINE

## 2021-11-21 RX ORDER — DICYCLOMINE HYDROCHLORIDE 10 MG/1
10 CAPSULE ORAL 3 TIMES DAILY
Status: DISCONTINUED | OUTPATIENT
Start: 2021-11-21 | End: 2021-11-23 | Stop reason: HOSPADM

## 2021-11-21 RX ADMIN — CLONAZEPAM 0.5 MG: 0.5 TABLET ORAL at 16:35

## 2021-11-21 RX ADMIN — SODIUM CHLORIDE 100 ML/HR: 9 INJECTION, SOLUTION INTRAVENOUS at 00:48

## 2021-11-21 RX ADMIN — OXYCODONE AND ACETAMINOPHEN 1 TABLET: 5; 325 TABLET ORAL at 16:35

## 2021-11-21 RX ADMIN — IRON SUCROSE 300 MG: 20 INJECTION, SOLUTION INTRAVENOUS at 11:47

## 2021-11-21 RX ADMIN — VANCOMYCIN HYDROCHLORIDE 125 MG: 125 CAPSULE ORAL at 06:35

## 2021-11-21 RX ADMIN — CARVEDILOL 3.12 MG: 3.12 TABLET, FILM COATED ORAL at 17:01

## 2021-11-21 RX ADMIN — OXYCODONE AND ACETAMINOPHEN 1 TABLET: 5; 325 TABLET ORAL at 23:38

## 2021-11-21 RX ADMIN — SODIUM CHLORIDE, PRESERVATIVE FREE 10 ML: 5 INJECTION INTRAVENOUS at 09:18

## 2021-11-21 RX ADMIN — CARVEDILOL 3.12 MG: 3.12 TABLET, FILM COATED ORAL at 09:17

## 2021-11-21 RX ADMIN — INSULIN GLARGINE 10 UNITS: 100 INJECTION, SOLUTION SUBCUTANEOUS at 09:25

## 2021-11-21 RX ADMIN — PRAVASTATIN SODIUM 40 MG: 40 TABLET ORAL at 09:17

## 2021-11-21 RX ADMIN — SODIUM CHLORIDE, PRESERVATIVE FREE 10 ML: 5 INJECTION INTRAVENOUS at 22:03

## 2021-11-21 RX ADMIN — DICYCLOMINE HYDROCHLORIDE 10 MG: 10 CAPSULE ORAL at 16:35

## 2021-11-21 RX ADMIN — CLONIDINE HYDROCHLORIDE 0.1 MG: 0.1 TABLET ORAL at 16:35

## 2021-11-21 RX ADMIN — CLONIDINE HYDROCHLORIDE 0.1 MG: 0.1 TABLET ORAL at 09:17

## 2021-11-21 RX ADMIN — OXYCODONE AND ACETAMINOPHEN 1 TABLET: 5; 325 TABLET ORAL at 00:48

## 2021-11-21 RX ADMIN — VANCOMYCIN HYDROCHLORIDE 125 MG: 125 CAPSULE ORAL at 11:47

## 2021-11-21 RX ADMIN — VANCOMYCIN HYDROCHLORIDE 125 MG: 125 CAPSULE ORAL at 23:38

## 2021-11-21 RX ADMIN — SODIUM CHLORIDE 100 ML/HR: 9 INJECTION, SOLUTION INTRAVENOUS at 13:38

## 2021-11-21 RX ADMIN — DULOXETINE HYDROCHLORIDE 30 MG: 30 CAPSULE, DELAYED RELEASE ORAL at 09:17

## 2021-11-21 RX ADMIN — DICYCLOMINE HYDROCHLORIDE 10 MG: 10 CAPSULE ORAL at 22:03

## 2021-11-21 RX ADMIN — OXYCODONE AND ACETAMINOPHEN 1 TABLET: 5; 325 TABLET ORAL at 06:34

## 2021-11-21 RX ADMIN — VANCOMYCIN HYDROCHLORIDE 125 MG: 125 CAPSULE ORAL at 17:00

## 2021-11-21 RX ADMIN — SODIUM CHLORIDE 100 ML/HR: 9 INJECTION, SOLUTION INTRAVENOUS at 23:38

## 2021-11-21 NOTE — PLAN OF CARE
Problem: Adult Inpatient Plan of Care  Goal: Plan of Care Review  Outcome: Ongoing, Progressing  Flowsheets (Taken 11/21/2021 1846)  Progress: improving  Plan of Care Reviewed With: patient  Outcome Summary: vss, no falls, c/o pain, prn percocet, vanc po, less stools, continue to monitor  Goal: Patient-Specific Goal (Individualized)  Outcome: Ongoing, Progressing  Goal: Absence of Hospital-Acquired Illness or Injury  Outcome: Ongoing, Progressing  Intervention: Identify and Manage Fall Risk  Recent Flowsheet Documentation  Taken 11/21/2021 1846 by Anamika Carranza RN  Safety Promotion/Fall Prevention:   activity supervised   assistive device/personal items within reach   clutter free environment maintained   fall prevention program maintained   nonskid shoes/slippers when out of bed   room organization consistent   safety round/check completed  Taken 11/21/2021 1635 by Anamika Carranza RN  Safety Promotion/Fall Prevention:   activity supervised   assistive device/personal items within reach   clutter free environment maintained   fall prevention program maintained   nonskid shoes/slippers when out of bed   room organization consistent   safety round/check completed  Taken 11/21/2021 1416 by Anamika Carranza RN  Safety Promotion/Fall Prevention:   activity supervised   assistive device/personal items within reach   clutter free environment maintained   fall prevention program maintained   nonskid shoes/slippers when out of bed   room organization consistent   safety round/check completed  Taken 11/21/2021 1215 by Anamika Carranza RN  Safety Promotion/Fall Prevention:   assistive device/personal items within reach   activity supervised   clutter free environment maintained   fall prevention program maintained   nonskid shoes/slippers when out of bed   safety round/check completed   room organization consistent  Taken 11/21/2021 1005 by Anamika Carranza RN  Safety Promotion/Fall Prevention:   activity  supervised   assistive device/personal items within reach   clutter free environment maintained   fall prevention program maintained   nonskid shoes/slippers when out of bed   room organization consistent   safety round/check completed  Taken 11/21/2021 0918 by Anamika Carranza RN  Safety Promotion/Fall Prevention:   activity supervised   clutter free environment maintained   assistive device/personal items within reach   fall prevention program maintained   nonskid shoes/slippers when out of bed   room organization consistent   safety round/check completed  Taken 11/21/2021 0747 by Anamika Carranza RN  Safety Promotion/Fall Prevention:   activity supervised   assistive device/personal items within reach   clutter free environment maintained   fall prevention program maintained   nonskid shoes/slippers when out of bed   room organization consistent   safety round/check completed  Intervention: Prevent Skin Injury  Recent Flowsheet Documentation  Taken 11/21/2021 1416 by Anamika Carranza RN  Body Position: position changed independently  Taken 11/21/2021 0918 by Anamika Carranza RN  Body Position:   tilted, right   position changed independently  Goal: Optimal Comfort and Wellbeing  Outcome: Ongoing, Progressing  Goal: Readiness for Transition of Care  Outcome: Ongoing, Progressing     Problem: Diabetes Comorbidity  Goal: Blood Glucose Level Within Desired Range  Outcome: Ongoing, Progressing     Problem: Hypertension Comorbidity  Goal: Blood Pressure in Desired Range  Outcome: Ongoing, Progressing     Problem: Pain Chronic (Persistent) (Comorbidity Management)  Goal: Acceptable Pain Control and Functional Ability  Outcome: Ongoing, Progressing  Intervention: Develop Pain Management Plan  Recent Flowsheet Documentation  Taken 11/21/2021 1635 by Anamika Carranza RN  Pain Management Interventions:   see MAR   position adjusted   pillow support provided  Taken 11/21/2021 1416 by Anamika Carranza RN  Pain  Management Interventions: pillow support provided  Taken 11/21/2021 0918 by Anamika Carranza RN  Pain Management Interventions:   position adjusted   pillow support provided  Intervention: Optimize Psychosocial Wellbeing  Recent Flowsheet Documentation  Taken 11/21/2021 1416 by Anamika Carranza RN  Diversional Activities: television  Taken 11/21/2021 0918 by Anamika Carranza RN  Diversional Activities: television     Problem: Fall Injury Risk  Goal: Absence of Fall and Fall-Related Injury  Outcome: Ongoing, Progressing  Intervention: Promote Injury-Free Environment  Recent Flowsheet Documentation  Taken 11/21/2021 1846 by Anamika Carranza RN  Safety Promotion/Fall Prevention:   activity supervised   assistive device/personal items within reach   clutter free environment maintained   fall prevention program maintained   nonskid shoes/slippers when out of bed   room organization consistent   safety round/check completed  Taken 11/21/2021 1635 by Anamika Carranza RN  Safety Promotion/Fall Prevention:   activity supervised   assistive device/personal items within reach   clutter free environment maintained   fall prevention program maintained   nonskid shoes/slippers when out of bed   room organization consistent   safety round/check completed  Taken 11/21/2021 1416 by Anamika Carranza RN  Safety Promotion/Fall Prevention:   activity supervised   assistive device/personal items within reach   clutter free environment maintained   fall prevention program maintained   nonskid shoes/slippers when out of bed   room organization consistent   safety round/check completed  Taken 11/21/2021 1215 by Anamika Carranza RN  Safety Promotion/Fall Prevention:   assistive device/personal items within reach   activity supervised   clutter free environment maintained   fall prevention program maintained   nonskid shoes/slippers when out of bed   safety round/check completed   room organization consistent  Taken 11/21/2021  1005 by Anamika Carranza RN  Safety Promotion/Fall Prevention:   activity supervised   assistive device/personal items within reach   clutter free environment maintained   fall prevention program maintained   nonskid shoes/slippers when out of bed   room organization consistent   safety round/check completed  Taken 11/21/2021 0918 by Anamika Carranza RN  Safety Promotion/Fall Prevention:   activity supervised   clutter free environment maintained   assistive device/personal items within reach   fall prevention program maintained   nonskid shoes/slippers when out of bed   room organization consistent   safety round/check completed  Taken 11/21/2021 0747 by Anamika Carranza RN  Safety Promotion/Fall Prevention:   activity supervised   assistive device/personal items within reach   clutter free environment maintained   fall prevention program maintained   nonskid shoes/slippers when out of bed   room organization consistent   safety round/check completed   Goal Outcome Evaluation:  Plan of Care Reviewed With: patient        Progress: improving  Outcome Summary: vss, no falls, c/o pain, prn percocet, vanc po, less stools, continue to monitor

## 2021-11-21 NOTE — PROGRESS NOTES
[facility]  Inpatient Progress Note    Reason for Follow Up: C diff colitis, rectal bleeding    Subjective     Interval History:   C diff +.  Now on vancomycin and improving.  Less diarrhea.  Less abdominal tenderness.      Current Facility-Administered Medications:   •  acetaminophen (TYLENOL) tablet 650 mg, 650 mg, Oral, Q4H PRN **OR** acetaminophen (TYLENOL) 160 MG/5ML solution 650 mg, 650 mg, Oral, Q4H PRN **OR** acetaminophen (TYLENOL) suppository 650 mg, 650 mg, Rectal, Q4H PRN, Ana Galeas APRN  •  carvedilol (COREG) tablet 3.125 mg, 3.125 mg, Oral, BID With Meals, Usman Stratton APRN, 3.125 mg at 11/21/21 0917  •  clonazePAM (KlonoPIN) tablet 0.5 mg, 0.5 mg, Oral, BID PRN, Usman Stratton APRN, 0.5 mg at 11/20/21 2124  •  cloNIDine (CATAPRES) tablet 0.1 mg, 0.1 mg, Oral, TID, Usman Stratton APRN, 0.1 mg at 11/21/21 0917  •  dextrose (D50W) (25 g/50 mL) IV injection 25 g, 25 g, Intravenous, Q15 Min PRN, Ana Galeas APRN  •  dextrose (GLUTOSE) oral gel 15 g, 15 g, Oral, Q15 Min PRN, Ana Galeas APRN  •  DULoxetine (CYMBALTA) DR capsule 30 mg, 30 mg, Oral, Daily, Usman Stratton APRN, 30 mg at 11/21/21 0917  •  glucagon (human recombinant) (GLUCAGEN DIAGNOSTIC) injection 1 mg, 1 mg, Subcutaneous, Q15 Min PRN, Ana Galeas APRN  •  insulin glargine (LANTUS, SEMGLEE) injection 10 Units, 10 Units, Subcutaneous, Daily, Usman Stratton APRN, 10 Units at 11/21/21 0925  •  insulin lispro (ADMELOG) injection 0-9 Units, 0-9 Units, Subcutaneous, 4x Daily With Meals & Nightly, Ana Galeas, APRN  •  iron sucrose (VENOFER) 300 mg in sodium chloride 0.9 % 250 mL IVPB, 300 mg, Intravenous, Q24H, Jeb Nichole MD, 300 mg at 11/20/21 1445  •  nitroglycerin (NITROSTAT) SL tablet 0.4 mg, 0.4 mg, Sublingual, Q5 Min PRN, Ana Galeas, APRN  •  ondansetron (ZOFRAN) injection 4 mg, 4 mg, Intravenous, Q6H PRN, Adal  GRZEGORZ Garrett  •  oxyCODONE-acetaminophen (PERCOCET) 5-325 MG per tablet 1 tablet, 1 tablet, Oral, Q6H PRN, Jeb Nichole MD, 1 tablet at 11/21/21 0634  •  Pharmacy Consult, , Does not apply, Continuous PRN, Jeb Nichole MD  •  pravastatin (PRAVACHOL) tablet 40 mg, 40 mg, Oral, Daily, Usman Stratton APRN, 40 mg at 11/21/21 0917  •  [COMPLETED] Insert peripheral IV, , , Once **AND** sodium chloride 0.9 % flush 10 mL, 10 mL, Intravenous, PRN, Ziggy Yeung MD  •  sodium chloride 0.9 % flush 10 mL, 10 mL, Intravenous, Q12H, Ana Galeas APRN, 10 mL at 11/21/21 0918  •  sodium chloride 0.9 % flush 10 mL, 10 mL, Intravenous, PRN, Ana Galeas APRN  •  sodium chloride 0.9 % infusion, 100 mL/hr, Intravenous, Continuous, Jeb Nichole MD, Last Rate: 100 mL/hr at 11/21/21 0048, 100 mL/hr at 11/21/21 0048  •  vancomycin (VANCOCIN) capsule 125 mg, 125 mg, Oral, Q6H, Jeb Nichole MD, 125 mg at 11/21/21 0635  Review of Systems:   All systems reviewed and negative except for: GI: Diarrhea, abdominal tenderness    Objective     Vital Signs  Temp:  [97.8 °F (36.6 °C)-98.9 °F (37.2 °C)] 98.9 °F (37.2 °C)  Heart Rate:  [62-79] 79  Resp:  [16-18] 16  BP: ()/(53-83) 106/63  Body mass index is 29.94 kg/m².    Intake/Output Summary (Last 24 hours) at 11/21/2021 1038  Last data filed at 11/21/2021 0034  Gross per 24 hour   Intake --   Output 600 ml   Net -600 ml     No intake/output data recorded.     Physical Exam:   General: patient awake, alert and cooperative   Eyes: Normal lids and lashes, no scleral icterus   Neck: supple, normal ROM   Skin: warm and dry, not jaundiced   Cardiovascular: regular rhythm and rate, no murmurs auscultated   Pulm: clear to auscultation bilaterally, regular and unlabored   Abdomen: soft, obese, tender and a little distended; normal bowel sounds   Rectal: deferred   Extremities: no rash or edema   Psychiatric: Normal mood  and behavior; memory intact     Results Review:     I reviewed the patient's new clinical results.    Results from last 7 days   Lab Units 11/21/21  0943 11/20/21 2004 11/20/21  0751 11/19/21 2026 11/19/21  0910   WBC 10*3/mm3 3.48  --  2.96*  --  4.10   HEMOGLOBIN g/dL 8.1* 9.3* 7.8*  7.8*   < > 8.8*   HEMATOCRIT % 26.7* 30.1* 25.2*  25.2*   < > 27.7*   PLATELETS 10*3/mm3 282  --  319  --  315    < > = values in this interval not displayed.     Results from last 7 days   Lab Units 11/20/21  0751 11/19/21  0910   SODIUM mmol/L 137 140   POTASSIUM mmol/L 3.7 4.0   CHLORIDE mmol/L 104 105   CO2 mmol/L 25.3 27.3   BUN mg/dL 4* 9   CREATININE mg/dL 0.62* 0.68*   CALCIUM mg/dL 8.2* 8.5*   BILIRUBIN mg/dL  --  0.2   ALK PHOS U/L  --  108   ALT (SGPT) U/L  --  18   AST (SGOT) U/L  --  19   GLUCOSE mg/dL 125* 104*     Results from last 7 days   Lab Units 11/20/21  0751   INR  1.18*     Lab Results   Lab Value Date/Time    LIPASE 13 11/19/2021 0910    LIPASE 39 04/07/2021 1524    LIPASE 52 10/11/2020 0822    LIPASE 103 08/13/2020 0445    LIPASE 199 07/19/2020 1002    LIPASE 17 06/12/2014 1730       Radiology:  CT Abdomen Pelvis With Contrast   Final Result   1. CT findings of sigmoid colitis.   2. Areas of airspace disease within the visualized lower lung fields   particularly severe within the right lower lobe. The patient has history   of recent COVID pneumonia and is likely represent evolving sequela.       These findings were discussed with Dr. Ziggy Yeung by telephone at the   time of dictation.       Radiation dose reduction techniques were utilized, including automated   exposure control and exposure modulation based on body size.       This report was finalized on 11/19/2021 2:10 PM by Dr. Radha Delgado M.D.              Assessment/Plan     Patient Active Problem List   Diagnosis   • Chest pain at rest   • Chronic pain   • Diabetes mellitus (HCC)   • Hypertension   • DDD (degenerative disc disease), lumbar   •  Pulmonary nodule, needs follow up PET scan as outpatient   • Drug-seeking behavior   • Depression with suicidal ideation   • History of methicillin resistant staphylococcus aureus (MRSA)   • PTSD (post-traumatic stress disorder)   • Opioid use disorder, moderate, in sustained remission (HCC)   • Iron deficiency anemia   • Cellulitis of left hand   • Acute colitis   • Hematochezia   • Anticoagulated   • Polysubstance abuse (HCC)   • Methadone dependence (HCC)   • History of COVID-19   • C. difficile colitis       Impression  1.  Segmental colitis: c diff positive    2.  Rectal bleeding: With above, he was also recently on Eliquis    3.  Recent anticoagulation    4.  Acute on chronic anemia    Plan  Vancomycin for C diff  Follow hemoglobin  Monitor stools, some degree of hematochezia is expected in the setting of c diff colitis.  He has never had a colonoscopy, he has iron deficiency anemia; he will need EGD and colonoscopy in the outpatient setting when he has recovered from his acute infection  Message sent to my office to arrange follow-up--I discussed with him today that he does need to have a colonoscopy given that he has never had one, he verbalizes understanding    GI will sign off but remain available as needed in this patient's care.  Thank you.      I discussed the patients findings and my recommendations with patient and nursing staff.    All necessary PPE, including face mask and eye protection, were worn during this encounter.  Hand sanitization was performed both before and after the patient interaction.    Over 25 minutes was spent reviewing the patient's chart and records, in discussion with the patient, in examination of the patient, and in discussion with members of the patient's medical team.    Candi Lange MD

## 2021-11-22 ENCOUNTER — TELEPHONE (OUTPATIENT)
Dept: GASTROENTEROLOGY | Facility: CLINIC | Age: 61
End: 2021-11-22

## 2021-11-22 LAB
GLUCOSE BLDC GLUCOMTR-MCNC: 105 MG/DL (ref 70–130)
GLUCOSE BLDC GLUCOMTR-MCNC: 114 MG/DL (ref 70–130)
GLUCOSE BLDC GLUCOMTR-MCNC: 122 MG/DL (ref 70–130)
GLUCOSE BLDC GLUCOMTR-MCNC: 89 MG/DL (ref 70–130)
HCT VFR BLD AUTO: 29.1 % (ref 37.5–51)
HCT VFR BLD AUTO: 32.4 % (ref 37.5–51)
HGB BLD-MCNC: 8.5 G/DL (ref 13–17.7)
HGB BLD-MCNC: 9.8 G/DL (ref 13–17.7)

## 2021-11-22 PROCEDURE — 85014 HEMATOCRIT: CPT | Performed by: NURSE PRACTITIONER

## 2021-11-22 PROCEDURE — 85018 HEMOGLOBIN: CPT | Performed by: NURSE PRACTITIONER

## 2021-11-22 PROCEDURE — 25010000002 IRON SUCROSE PER 1 MG: Performed by: INTERNAL MEDICINE

## 2021-11-22 PROCEDURE — 82962 GLUCOSE BLOOD TEST: CPT

## 2021-11-22 PROCEDURE — 63710000001 INSULIN GLARGINE PER 5 UNITS: Performed by: NURSE PRACTITIONER

## 2021-11-22 PROCEDURE — 36415 COLL VENOUS BLD VENIPUNCTURE: CPT | Performed by: NURSE PRACTITIONER

## 2021-11-22 RX ADMIN — VANCOMYCIN HYDROCHLORIDE 125 MG: 125 CAPSULE ORAL at 08:58

## 2021-11-22 RX ADMIN — CLONAZEPAM 0.5 MG: 0.5 TABLET ORAL at 21:58

## 2021-11-22 RX ADMIN — IRON SUCROSE 300 MG: 20 INJECTION, SOLUTION INTRAVENOUS at 12:15

## 2021-11-22 RX ADMIN — CLONAZEPAM 0.5 MG: 0.5 TABLET ORAL at 09:15

## 2021-11-22 RX ADMIN — CARVEDILOL 3.12 MG: 3.12 TABLET, FILM COATED ORAL at 08:58

## 2021-11-22 RX ADMIN — PRAVASTATIN SODIUM 40 MG: 40 TABLET ORAL at 08:58

## 2021-11-22 RX ADMIN — DICYCLOMINE HYDROCHLORIDE 10 MG: 10 CAPSULE ORAL at 08:58

## 2021-11-22 RX ADMIN — INSULIN GLARGINE 10 UNITS: 100 INJECTION, SOLUTION SUBCUTANEOUS at 08:57

## 2021-11-22 RX ADMIN — OXYCODONE AND ACETAMINOPHEN 1 TABLET: 5; 325 TABLET ORAL at 09:15

## 2021-11-22 RX ADMIN — VANCOMYCIN HYDROCHLORIDE 125 MG: 125 CAPSULE ORAL at 12:15

## 2021-11-22 RX ADMIN — DICYCLOMINE HYDROCHLORIDE 10 MG: 10 CAPSULE ORAL at 17:43

## 2021-11-22 RX ADMIN — CLONIDINE HYDROCHLORIDE 0.1 MG: 0.1 TABLET ORAL at 08:58

## 2021-11-22 RX ADMIN — CLONIDINE HYDROCHLORIDE 0.1 MG: 0.1 TABLET ORAL at 17:43

## 2021-11-22 RX ADMIN — DULOXETINE HYDROCHLORIDE 30 MG: 30 CAPSULE, DELAYED RELEASE ORAL at 08:58

## 2021-11-22 RX ADMIN — DICYCLOMINE HYDROCHLORIDE 10 MG: 10 CAPSULE ORAL at 21:56

## 2021-11-22 RX ADMIN — SODIUM CHLORIDE, PRESERVATIVE FREE 10 ML: 5 INJECTION INTRAVENOUS at 21:56

## 2021-11-22 RX ADMIN — CARVEDILOL 3.12 MG: 3.12 TABLET, FILM COATED ORAL at 17:43

## 2021-11-22 RX ADMIN — VANCOMYCIN HYDROCHLORIDE 125 MG: 125 CAPSULE ORAL at 17:43

## 2021-11-22 NOTE — CASE MANAGEMENT/SOCIAL WORK
Discharge Planning Assessment  [facility]     Patient Name: Kwame Andres  MRN: [id number]  Today's Date: 11/22/2021    Admit Date: 11/19/2021     Discharge Needs Assessment     Row Name 11/22/21 1235       Living Environment    Lives With sibling(s)    Name(s) of Who Lives With Patient Sister Karen German    Current Living Arrangements home/apartment/condo    Primary Care Provided by self    Provides Primary Care For no one, unable/limited ability to care for self    Family Caregiver if Needed sibling(s)    Family Caregiver Names sister Karen German [phone]    Quality of Family Relationships helpful    Able to Return to Prior Arrangements yes       Resource/Environmental Concerns    Resource/Environmental Concerns none       Transition Planning    Patient/Family Anticipates Transition to home with family    Patient/Family Anticipated Services at Transition none    Transportation Anticipated family or friend will provide       Discharge Needs Assessment    Readmission Within the Last 30 Days no previous admission in last 30 days    Equipment Currently Used at Home cane, straight; oxygen; walker, standard; grab bar; shower chair    Concerns to be Addressed basic needs    Anticipated Changes Related to Illness none    Equipment Needed After Discharge none               Discharge Plan     Row Name 11/22/21 0720       Plan    Plan Return home with family    Patient/Family in Agreement with Plan yes    Plan Comments Spoke with patient by telephone due to isolation precautions.  Patient lives witgh sister Karen German [phone], is IADL, uses a cane, has a walker, grab bars in the BR, shower chair, O2 from [place].  He has never used HH or been to SNF.  He does not have a PCP - he requested CCP to make appointment with a male MD near hospital.  Spoke with appointment liason - appointment with Glenn Taylor DO for December 9th.  Patient plans to return home at [place].  CCP will follow, will watch for PT eval. BHumeniuk  RN              Continued Care and Services - Admitted Since 11/19/2021    Coordination has not been started for this encounter.          Demographic Summary     Row Name 11/22/21 1230       General Information    Admission Type inpatient    Arrived From home    Referral Source admission list    Reason for Consult discharge planning    Preferred Language English     Used During This Interaction no               Functional Status     Row Name 11/22/21 1231       Functional Status    Usual Activity Tolerance moderate    Current Activity Tolerance fair       Functional Status, IADL    Medications assistive person; independent  Lives with his sister    Meal Preparation assistive person    Housekeeping assistive person    Laundry assistive person    Shopping assistive person       Mental Status    General Appearance WDL --  Unable to assess       Mental Status Summary    Recent Changes in Mental Status/Cognitive Functioning no changes                        Becky S. Humeniuk, RN

## 2021-11-22 NOTE — PROGRESS NOTES
Name: Kawme Andres ADMIT: 2021   : 1960  PCP: Provider, No Known    MRN: [id number] LOS: 2 days   AGE/SEX: 61 y.o. male  ROOM: [facility]/     Subjective   Subjective   Resting in bed. No family present. States he lives with his sister who helps care for him since returning home after covid. Has oxygen at home. Denies any dyspnea, cough, fever or chills. Has been dealing with fecal incontinence. Had two loose stools last night. Some nausea, but ate all his breakfast. No vomiting. Reports some cramping. No fever or chills.      Objective   Objective   Vital Signs  Temp:  [97.5 °F (36.4 °C)-98.3 °F (36.8 °C)] 98.1 °F (36.7 °C)  Heart Rate:  [61-88] 65  Resp:  [16] 16  BP: ()/(53-92) 136/80  SpO2:  [86 %-98 %] 97 %  on  Flow (L/min):  [2-3] 2;   Device (Oxygen Therapy): nasal cannula  Body mass index is 30.27 kg/m².     Physical Exam  Vitals and nursing note reviewed.   Constitutional:       General: He is not in acute distress.     Appearance: He is obese. He is ill-appearing. He is not toxic-appearing.   HENT:      Head: Normocephalic.   Cardiovascular:      Rate and Rhythm: Normal rate and regular rhythm.      Pulses: No decreased pulses.   Pulmonary:      Effort: Pulmonary effort is normal.      Breath sounds: Normal breath sounds.   Abdominal:      General: Bowel sounds are normal.      Palpations: Abdomen is soft.      Tenderness: There is no abdominal tenderness.   Musculoskeletal:         General: Normal range of motion.      Cervical back: Normal range of motion and neck supple.   Skin:     General: Skin is warm and dry.      Findings: No ecchymosis.   Neurological:      General: No focal deficit present.      Mental Status: He is alert and oriented to person, place, and time.      Motor: Weakness present.   Psychiatric:         Mood and Affect: Mood normal.         Behavior: Behavior normal.      Results Review:       I reviewed the patient's new clinical results.  Results from last 7  days   Lab Units 11/22/21  0811 11/21/21 2133 11/21/21  0943 11/20/21 2004 11/20/21 0751 11/20/21 0751 11/19/21 2026 11/19/21  0910   WBC 10*3/mm3  --   --  3.48  --   --  2.96*  --  4.10   HEMOGLOBIN g/dL 8.5* 9.8* 8.1* 9.3*   < > 7.8*  7.8*   < > 8.8*   PLATELETS 10*3/mm3  --   --  282  --   --  319  --  315    < > = values in this interval not displayed.     Results from last 7 days   Lab Units 11/21/21  0943 11/20/21  0751 11/19/21  0910   SODIUM mmol/L 140 137 140   POTASSIUM mmol/L 4.4 3.7 4.0   CHLORIDE mmol/L 107 104 105   CO2 mmol/L 24.0 25.3 27.3   BUN mg/dL 6* 4* 9   CREATININE mg/dL 0.73* 0.62* 0.68*   GLUCOSE mg/dL 140* 125* 104*   Estimated Creatinine Clearance: 138.4 mL/min (A) (by C-G formula based on SCr of 0.73 mg/dL (L)).  Results from last 7 days   Lab Units 11/19/21  0910   ALBUMIN g/dL 3.90   BILIRUBIN mg/dL 0.2   ALK PHOS U/L 108   AST (SGOT) U/L 19   ALT (SGPT) U/L 18     Results from last 7 days   Lab Units 11/21/21  0943 11/20/21  0751 11/19/21  0910   CALCIUM mg/dL 7.8* 8.2* 8.5*   ALBUMIN g/dL  --   --  3.90       Glucose   Date/Time Value Ref Range Status   11/22/2021 1154 122 70 - 130 mg/dL Final     Comment:     Meter: [id number] : [id number] Alyssa Sheppard RN   11/22/2021 0555 89 70 - 130 mg/dL Final     Comment:     Meter: [id number] : [id number] Lela WALLACE   11/21/2021 2122 101 70 - 130 mg/dL Final     Comment:     Meter: [id number] : [id number] Lela WALLACE   11/21/2021 1605 100 70 - 130 mg/dL Final     Comment:     Meter: [id number] : [id number] Trevor Starkey NA   11/21/2021 0632 80 70 - 130 mg/dL Final     Comment:     Meter: [id number] : [id number] [place]ciro Waller NA   11/20/2021 2109 121 70 - 130 mg/dL Final     Comment:     RN Notified R and V Meter: [id number] : [id number] WALLACE   11/20/2021 1628 108 70 - 130 mg/dL Final     Comment:     RN Notified R and V Meter: [id number] : [id number] WALLACE        carvedilol, 3.125 mg, Oral, BID With Meals  cloNIDine, 0.1 mg, Oral, TID  dicyclomine, 10 mg, Oral, TID  DULoxetine, 30 mg, Oral, Daily  insulin glargine, 10 Units, Subcutaneous, Daily  insulin lispro, 0-9 Units, Subcutaneous, 4x Daily With Meals & Nightly  pravastatin, 40 mg, Oral, Daily  sodium chloride, 10 mL, Intravenous, Q12H  vancomycin, 125 mg, Oral, Q6H      Pharmacy Consult,     Diet Regular; Consistent Carbohydrate       Assessment/Plan     Active Hospital Problems    Diagnosis  POA   • **C. difficile colitis [A04.72]  Unknown   • Acute colitis [K52.9]  Yes   • Hematochezia [K92.1]  Yes   • Anticoagulated [Z79.01]  Not Applicable   • Methadone dependence (HCC) [F11.20]  Yes   • History of COVID-19 [Z86.16]  Unknown   • Iron deficiency anemia [D50.9]  Yes   • History of methicillin resistant staphylococcus aureus (MRSA) [Z86.14]  Yes   • PTSD (post-traumatic stress disorder) [F43.10]  Yes   • Hypertension [I10]  Yes      Resolved Hospital Problems   No resolved problems to display.     Mr. Andres is a 61 year old male who initially presented to the hospital for abdominal pain and bloody stools. CT A/P on admission showed mild sigmoid colitis. Stool studies ordered showing positive for C.diff.  He did just had a prolonged stay at [facility] (10/17- 11/1) for COVID19 requiring mechanical ventilation. Was on Eliquis prior to admission given elevated d-dimer and was on supplement oxygen at home.     · C.diff colitis/hematochezia: Now on oral vancomycin. Eliquis held on admission given bloody stools- not unexpected with C.diff but plans for colonoscopy and EGD in outpatient setting given associated TYREL. GI followed but signed off. Diarrhea slowly improving.   · TYREL: Stable. S/P 3 doses of IV Venofer. Monitor trends.   · History of COVID19: D-dimer lower than prior values. On supplemental oxygen at home. Needs PCP at discharge for further follow up. No clinical findings to suggest need for ongoing  anticoagulation.   · HTN: Stable on Coreg and clonidine. Monitor trends.  · PTSD/methadone dependence: No indication for methadone at this time. Will defer to outpatient providers. Pain medication as needed while inpatient. Clonazepam and Cymbalta continued.  · DM2: A1c 6%. Sugars stable here. Lantus and SSI on board. Metformin on hold.    Discussed with patient and Dr. Nichole.    Consult PT to evaluate.    VTE Prophylaxis - SCDs  Code Status - Full code  Disposition - Anticipate discharge tomorrow with family.      GRZEGORZ Santos  [facility]  11/22/21  14:10 EST

## 2021-11-22 NOTE — PAYOR COMM NOTE
"NOTIFICATION OF ADMISSION  ID #[id number]  F:  [phone]        Kwame Andres (61 y.o. Male)             Date of Birth Social Security Number Address Home Phone MRN    1960  [address] [place] [address] [phone] [id number]    Anabaptist Marital Status             None Single       Admission Date Admission Type Admitting Provider Attending Provider Department, Room/Bed    11/19/21 Emergency Jeb Nichole MD [place], Robert Russell, MD [address], [address]    Discharge Date Discharge Disposition Discharge Destination                         Attending Provider: Jeb Nichole MD    Allergies: Shrimp    Isolation: Spore   Infection: MRSA/History Only (11/19/21), COVID (History) (11/19/21), C.difficile (11/20/21)   Code Status: CPR   Advance Care Planning Activity    Ht: 188 cm (74\")   Wt: 107 kg (235 lb 12.8 oz)    Admission Cmt: None   Principal Problem: C. difficile colitis [A04.72]                 Active Insurance as of 11/19/2021     Primary Coverage     Payor Plan Insurance Group Employer/Plan Group    [facility] BY MCMA[facility]AN [id number]     Payor Plan Address Payor Plan Phone Number Payor Plan Fax Number Effective Dates    [address]   1/1/2021 - None Entered    [place] [address]       Subscriber Name Subscriber Birth Date Member ID       KWAME ANDRES 1960 [id number]                 Emergency Contacts      (Rel.) Home Phone Work Phone Mobile Phone    Jose Angel Olson (Father) -- -- [phone]               History & Physical      Ana Galeas APRN at 11/19/21 1323     Attestation signed by Jeb Nichole MD at 11/19/21 8446    Addendum: I have reviewed the history and plan as obtained by GRZEGORZ Franklin and performed my own independent history. I have personally examined the patient. My exam confirms her physical findings and I agree with the plan as listed " above, with the addition of the following:     This is a 61-year-old male who presented to the emergency room with abdominal pain and bloody stools.  On exam he is in no acute distress.  Does not look septic.  Heart is regular.  Breath sounds are diminished.  Abdominal exam reveals some mild lower quadrant discomfort to palpation.  Positive bowel sounds.  No significant peripheral edema.  CT of the abdomen pelvis today showed some mild sigmoid colitis.  We will check stool studies.  GI has been consulted and will consider addition of antibiotics based upon studies and their input.  Trend hemoglobin and transfuse as needed.  Stop Eliquis which was started at [place] after he was treated there a couple of weeks ago for Covid pneumonia without evidence of DVT or PE.  Oxygenation appears to be at his current baseline status post Covid pneumonia.  Additional plans based on his clinical course and as outlined by APRN.    Jeb Nichole MD  [facility]  11/19/21  13:50 EST                        Patient Name:  Kwame Andres  YOB: 1960  MRN:  [id number]  Admit Date:  11/19/2021  Patient Care Team:  Provider, No Known as PCP - General  Provider, No Known as PCP - Family Medicine      Subjective   History Present Illness     Chief Complaint   Patient presents with   • Black or Bloody Stool   • Shortness of Breath       Mr. Andres is a 61 y.o. with a history of HTN, TYREL, methadone dependence, MRSA, recent COVID-19 admission at [facility] October 2021, history of C. Difficile.  He was admitted 10/17-11/1 to [facility] with COVID-19 treated with Decadron, Remdesivir, azithromycin, baricitinib, and requiring mechanical ventilation.  He was discharged on Eliquis 5 mg twice daily for PE prophylaxis given Ddimer of 4k at time of discharge. He was also discharged on supplemental 02 at 4L.  He states he has been slowly improving from the COVID-19 standpoint but still gets  short of breath with ambulation.  Last week he had 2 episodes of diarrhea which he attributed to eating bad chicken.  Proximately 4 days ago he started having episodes of mucousy loose stool with fecal urgency and incontinence.  Diarrhea is worse after eating but can occur without exacerbating factors.  Nothing is made the diarrhea worse.  Mild abdominal pain no nausea or vomiting.  Yesterday he had 2 stools with blood in it so he came to the ER for further evaluation.  The past 3 days he has taken 2 aspirin daily for dental pain. Stools are similar to when he had C. Difficile.  He denies black stool, hematemesis, fever, chills, chest pain, palpitations, rash.         History of Present Illness  Review of Systems   Constitutional: Negative for appetite change, chills, diaphoresis and fatigue.   HENT: Negative for congestion and trouble swallowing.    Respiratory: Positive for shortness of breath (with exertion ). Negative for choking and chest tightness.    Cardiovascular: Negative for chest pain, palpitations and leg swelling.   Gastrointestinal: Positive for abdominal pain, blood in stool and diarrhea. Negative for abdominal distention, constipation, nausea and vomiting.   Genitourinary: Negative for dysuria.   Musculoskeletal: Negative for back pain.   Skin: Negative for pallor.   Neurological: Positive for light-headedness (when short of breath ). Negative for dizziness.   Hematological: Bruises/bleeds easily.   Psychiatric/Behavioral: Negative for confusion.        Personal History     Past Medical History:   Diagnosis Date   • Arthritis    • Chronic pain    • Diabetes mellitus (CMS/HCC)    • Herniated thoracic disc without myelopathy     by right scapula   • Hypertension    • Narcotic dependence (CMS/HCC)    • Pneumonia    • PTSD (post-traumatic stress disorder)    • Torn Achilles tendon      Past Surgical History:   Procedure Laterality Date   • HIP SURGERY Right      Family History   Problem Relation Age of  Onset   • Cancer Mother    • Hypertension Mother      Social History     Tobacco Use   • Smoking status: Never Smoker   • Smokeless tobacco: Not on file   Substance Use Topics   • Alcohol use: No   • Drug use: No     No current facility-administered medications on file prior to encounter.     Current Outpatient Medications on File Prior to Encounter   Medication Sig Dispense Refill   • aspirin 81 MG chewable tablet Chew 2 (two) times a day.     • carvedilol (COREG) 3.125 MG tablet Take 1 tablet by mouth 2 (Two) Times a Day With Meals. 60 tablet 0   • cloNIDine (CATAPRES) 0.1 MG tablet Take 1 tablet by mouth 3 (three) times a day. 21 tablet 0   • DULoxetine (CYMBALTA) 30 MG capsule Take 1 capsule by mouth daily. 7 capsule 0   • furosemide (LASIX) 40 MG tablet Take  by mouth daily.     • hydrOXYzine (ATARAX) 50 MG tablet Take 1 tablet by mouth 3 (three) times a day as needed for itching. 21 tablet 0   • insulin detemir (LEVEMIR) 100 UNIT/ML injection Inject 20 Units under the skin daily. in am  12   • metFORMIN (GLUCOPHAGE) 500 MG tablet Take  by mouth daily.     • pravastatin (PRAVACHOL) 20 MG tablet Take 2 tablets by mouth daily.       No Known Allergies    Objective    Objective     Vital Signs  Temp:  [97.2 °F (36.2 °C)] 97.2 °F (36.2 °C)  Heart Rate:  [103] 103  Resp:  [18] 18  BP: (139)/(82) 139/82  SpO2:  [97 %] 97 %  on  Flow (L/min):  [3] 3;   Device (Oxygen Therapy): nasal cannula  Body mass index is 28.25 kg/m².    Physical Exam  Vitals and nursing note reviewed.   Constitutional:       General: He is not in acute distress.     Appearance: He is well-developed. He is obese. He is not toxic-appearing.      Comments: Mildly ill appearing. NAD   HENT:      Head: Normocephalic and atraumatic.   Eyes:      Conjunctiva/sclera: Conjunctivae normal.   Neck:      Trachea: No tracheal deviation.   Cardiovascular:      Rate and Rhythm: Regular rhythm. Tachycardia present.   Pulmonary:      Effort: Pulmonary effort is  normal. No respiratory distress.      Breath sounds: Normal breath sounds.      Comments: Decreased breath sounds and coarse in bases  Abdominal:      General: Bowel sounds are normal. There is no distension.      Palpations: Abdomen is soft. There is no mass.      Tenderness: There is abdominal tenderness. There is no guarding or rebound.   Musculoskeletal:         General: Normal range of motion.      Cervical back: Normal range of motion.      Right lower leg: No edema.      Left lower leg: No edema.   Skin:     General: Skin is warm and dry.   Neurological:      General: No focal deficit present.      Mental Status: He is alert and oriented to person, place, and time.   Psychiatric:         Mood and Affect: Mood is anxious.         Speech: Speech is rapid and pressured.         Behavior: Behavior is cooperative.         Judgment: Judgment normal.      Comments: Pleasant but very anxious         Results Review:  I reviewed the patient's new clinical results.  I reviewed the patient's new imaging results and agree with the interpretation.  I reviewed the patient's other test results and agree with the interpretation  I personally viewed and interpreted the patient's EKG/Telemetry data  Discussed with ED provider.    Lab Results (last 24 hours)     Procedure Component Value Units Date/Time    CBC & Differential [[id number]]  (Abnormal) Collected: 11/19/21 0910    Specimen: Blood Updated: 11/19/21 0918    Narrative:      The following orders were created for panel order CBC & Differential.  Procedure                               Abnormality         Status                     ---------                               -----------         ------                     CBC Auto Differential[[id number]]        Abnormal            Final result                 Please view results for these tests on the individual orders.    Comprehensive Metabolic Panel [[id number]]  (Abnormal) Collected: 11/19/21 0910    Specimen: Blood Updated:  11/19/21 0934     Glucose 104 mg/dL      BUN 9 mg/dL      Creatinine 0.68 mg/dL      Sodium 140 mmol/L      Potassium 4.0 mmol/L      Chloride 105 mmol/L      CO2 27.3 mmol/L      Calcium 8.5 mg/dL      Total Protein 7.3 g/dL      Albumin 3.90 g/dL      ALT (SGPT) 18 U/L      AST (SGOT) 19 U/L      Alkaline Phosphatase 108 U/L      Total Bilirubin 0.2 mg/dL      eGFR  African Amer 144 mL/min/1.73      Globulin 3.4 gm/dL      A/G Ratio 1.1 g/dL      BUN/Creatinine Ratio 13.2     Anion Gap 7.7 mmol/L     Narrative:      GFR Normal >60  Chronic Kidney Disease <60  Kidney Failure <15      Lipase [[id number]]  (Normal) Collected: 11/19/21 0910    Specimen: Blood Updated: 11/19/21 0934     Lipase 13 U/L     CBC Auto Differential [[id number]]  (Abnormal) Collected: 11/19/21 0910    Specimen: Blood Updated: 11/19/21 0918     WBC 4.10 10*3/mm3      RBC 3.49 10*6/mm3      Hemoglobin 8.8 g/dL      Hematocrit 27.7 %      MCV 79.4 fL      MCH 25.2 pg      MCHC 31.8 g/dL      RDW 18.1 %      RDW-SD 51.7 fl      MPV 10.3 fL      Platelets 315 10*3/mm3      Neutrophil % 59.0 %      Lymphocyte % 24.4 %      Monocyte % 11.2 %      Eosinophil % 3.9 %      Basophil % 1.0 %      Immature Grans % 0.5 %      Neutrophils, Absolute 2.42 10*3/mm3      Lymphocytes, Absolute 1.00 10*3/mm3      Monocytes, Absolute 0.46 10*3/mm3      Eosinophils, Absolute 0.16 10*3/mm3      Basophils, Absolute 0.04 10*3/mm3      Immature Grans, Absolute 0.02 10*3/mm3      nRBC 0.0 /100 WBC     COVID PRE-OP / PRE-PROCEDURE SCREENING ORDER (NO ISOLATION) - Swab, Nasopharynx [[id number]]  (Normal) Collected: 11/19/21 1120    Specimen: Swab from Nasopharynx Updated: 11/19/21 1210    Narrative:      The following orders were created for panel order COVID PRE-OP / PRE-PROCEDURE SCREENING ORDER (NO ISOLATION) - Swab, Nasopharynx.  Procedure                               Abnormality         Status                     ---------                               -----------          ------                     COVID-19,[facility]...[[id number]]  Normal              Final result                 Please view results for these tests on the individual orders.    COVID-19,[facility] CEPHEID/JOAQUÍN NP SWAB IN TRANSPORT MEDIA 8-12 HR TAT - Swab, Nasopharynx [[id number]]  (Normal) Collected: 11/19/21 1120    Specimen: Swab from Nasopharynx Updated: 11/19/21 1210     COVID19 Not Detected    Narrative:      Fact sheet for providers: https://www.fda.gov/media/767824/download     Fact sheet for patients: https://www.fda.gov/media/047463/download          Imaging Results (Last 24 Hours)     Procedure Component Value Units Date/Time    CT Abdomen Pelvis With Contrast [[id number]] Collected: 11/19/21 1120     Updated: 11/19/21 1120    Narrative:      CT ABDOMEN AND PELVIS WITH CONTRAST     HISTORY: Abdominal discomfort with blood-streaked stool.     TECHNIQUE: Axial CT images of the abdomen and pelvis were obtained  following administration of intravenous contrast. The patient was not  given oral contrast Coronal and sagittal reformats were obtained.     COMPARISON: 06/12/2014     FINDINGS: Circumferential wall thickening involving majority of the  sigmoid colon with mucosal hyperenhancement is present. The findings are  most suggestive of a colitis. No extraluminal air or fluid collection.  No evidence of bowel obstruction. Moderate amount of formed stool is  seen within the proximal colon. The appendix is normal.     There are patchy areas of airspace disease identified within the  visualized lung fields particularly within the right lower lobe.  Majority of these areas of ground glass density with accompanying  interstitial thickening.     The liver demonstrates normal attenuation. There is a stable  hypoattenuating left hepatic lobe lesion favored to represent a benign  hemangioma. The gallbladder is normal. The spleen is normal. Pancreas is  normal without ductal dilatation. Bilateral adrenal  glands and kidneys  are normal. The urinary bladder is partially distended with  circumferential wall thickening. No pathological retroperitoneal  lymphadenopathy.       Impression:      1. CT findings of sigmoid colitis.  2. Areas of airspace disease within the visualized lower lung fields  particularly severe within the right lower lobe. The patient has history  of recent COVID pneumonia and is likely represent evolving sequela.     These findings were discussed with Dr. Ziggy Yeung by telephone at the  time of dictation.     Radiation dose reduction techniques were utilized, including automated  exposure control and exposure modulation based on body size.                    No orders to display        Assessment/Plan     Active Hospital Problems    Diagnosis  POA   • **Acute colitis [K52.9]  Yes   • Hematochezia [K92.1]  Yes   • Anticoagulated [Z79.01]  Not Applicable   • Methadone dependence (HCC) [F11.20]  Yes   • History of COVID-19 [Z86.16]  Unknown   • Iron deficiency anemia [D50.9]  Yes   • History of methicillin resistant staphylococcus aureus (MRSA) [Z86.14]  Yes   • PTSD (post-traumatic stress disorder) [F43.10]  Yes   • Hypertension [I10]  Yes      Resolved Hospital Problems   No resolved problems to display.       Mr. Andres is a 61 y.o. reported history of C. difficile and iron deficiency anemia that was discharged 11/1 from [facility] following severe COVID-19.  He was discharged on full dose Eliquis given significantly elevated D-dimer.  He presents with mucous , diarrhea with blood.     Colitis, diarrhea, hematochezia  Admit for observation to telemetry  CT with sigmoid colitis  Await C. difficile and GI PCR panel  Hold eliquis  Vital signs stable.  GI consulted by ER physician.  Clear liquid diet    Iron deficiency anemia  Hemoglobin baseline at time of discharge from [facility] was 9.  Currently is 8.8.  Check anemia studies.  Trend hemoglobin q 12 H&H given only 2 reported mild bloody  stools yesterday.  Check anemia studies.     Recent COVID-19 infection  Severe requiring intubation and baricitinib.  CT here with sequela of recent pneumonia.  COVID-19 test negative.    Discharge on eliquis for high Ddimer per note. Hold OAC and check ddimer.   Patient was discharged on 4 L but does not understand how to wean oxygen nor does he have a pulse oximeter at home.  Patient turned down to 2 L while in the room and  remains at 96%.  Maintain oxygen saturations 93% or above.  No pulmonary disease history.   IS and wean 02 as tolerated with adequate instructions the time of discharge.  Send patient home with pulse oximeter available on the COVID-19 floors    PTSD/methadone use/polysubstance abuse history  He reports a history of polysubstance abuse but is prescribed methadone as needed for chronic pain.  Thai reviewed    HTN   Continue home carvedilol and clonidine when medication reconciliation reviewed.    DM2  A1c.  Hold home Metformin.  Listed on home basal insulin which will be continue attenuated dose and trend glucose.  Avoid hypoglycemia      · I discussed the patient's findings and my recommendations with patient, nursing staff, ED provider and Dr. Nichole.    VTE Prophylaxis - SCDs.  Code Status - Full code.       GRZEGORZ Eaton  [facility]  11/19/21  13:33 EST    Electronically signed by Jeb Nichole MD at 11/19/21 1351          Emergency Department Notes      Malka Perez, RN at 11/19/21 0826        Pt arrived via ems from home with complaints of bloody stool x1wk.  Pt reports he had Covid in October and reports he has still felt SOA.  Pt reports he is on Eliquis. Pt wears home O2 3LNC all the time.     Pt was wearing a mask during assessment.  This RN wore appropriate PPE      Electronically signed by Malka Perez RN at 11/19/21 0818     Ziggy Yeung MD at 11/19/21 0876           EMERGENCY DEPARTMENT ENCOUNTER    Room Number:   LETI/LETI  Date of encounter:  11/19/2021  PCP: Provider, No Known  Historian: Pt    Patient was placed in face mask during triage process. Patient was wearing facemask when I entered the room and throughout our encounter. I wore full protective equipment throughout this patient encounter including a face mask, eye protection, and gloves. Hand hygiene was performed before donning protective equipment and again following doffing of PPE after leaving the room.    HPI:  Chief Complaint: Abdominal discomfort  A complete HPI/ROS/PMH/PSH/SH/FH are unobtainable due to: N/A   Context: Kwame Andres is a 61 y.o. male who presents to the ED c/o abdominal cramping that comes in waves over the last 5 to 7 days with associated mucousy and occasionally blood-streaked stooling.  Patient has had some fecal incontinence during sleep.  Stools have been rather loose.  Patient does have a remote history of C. difficile and was recently discharged from hospital following an episode of COVID-19 pneumonia and hypoxia.  Following that hospitalization he was placed on full anticoagulation and supplemental oxygen.  He reports over the last 2 weeks he has had intermittent episodes of mucousy bloody stool that became constant over the last 5 days.  Symptoms worsen with eating.  No vomiting reported.  No fevers reported.  Chronic dyspnea unchanged.  Discomfort mild at this time but intermittently is severe.  Patient uncertain of any antibiotics they may have received as inpatient during his last hospitalization but reports no oral antibiotics since discharge.  He did eat some chicken salad a couple of weeks ago which was followed by 2 days of diarrhea but then resolved for a week before current onset.      MEDICAL HISTORY REVIEW  Admit date: 10/17/2021  Admitting physician: Maren Bailey MD  Discharge date and time: 11/1/2021 11:15 AM  Discharging physician: JH Peoples MD  Discharged Condition: stable    Discharge  Diagnoses:Principal Problem:  COVID-19 (10/18/2021) POA: Unknown  Active Problems:  DM type 2 (diabetes mellitus, type 2) (CMS/[facility]) (10/21/2013) POA: Yes  Chest pain (5/29/2014) POA: Yes  Hypoxia (10/18/2021) POA: Unknown  Bilateral pneumonia (10/18/2021) POA: Unknown          PAST MEDICAL HISTORY  Active Ambulatory Problems     Diagnosis Date Noted   • Chest pain at rest 08/24/2016   • Chronic pain 08/24/2016   • Diabetes mellitus (HCC) 08/24/2016   • Hypertension 08/24/2016   • DDD (degenerative disc disease), lumbar 08/24/2016   • Pulmonary nodule, needs follow up PET scan as outpatient 08/26/2016   • Drug-seeking behavior 08/26/2016   • Depression with suicidal ideation 09/20/2016   • History of methicillin resistant staphylococcus aureus (MRSA) 12/19/2019   • PTSD (post-traumatic stress disorder) 12/19/2019   • Opioid use disorder, moderate, in sustained remission ([facility]) 12/19/2019   • Iron deficiency anemia 12/20/2019   • Cellulitis of left hand 12/20/2019   • Polysubstance abuse ([facility]) 11/19/2021     Resolved Ambulatory Problems     Diagnosis Date Noted   • No Resolved Ambulatory Problems     Past Medical History:   Diagnosis Date   • Arthritis    • Herniated thoracic disc without myelopathy    • Narcotic dependence (CMS/[facility])    • Pneumonia    • Torn Achilles tendon          PAST SURGICAL HISTORY  Past Surgical History:   Procedure Laterality Date   • HIP SURGERY Right          FAMILY HISTORY  Family History   Problem Relation Age of Onset   • Cancer Mother    • Hypertension Mother          SOCIAL HISTORY  Social History     Socioeconomic History   • Marital status: Single   Tobacco Use   • Smoking status: Never Smoker   Substance and Sexual Activity   • Alcohol use: No   • Drug use: No         ALLERGIES  Patient has no known allergies.        REVIEW OF SYSTEMS  Review of Systems     All systems reviewed and negative except for those discussed in HPI.       PHYSICAL EXAM    I have reviewed the triage vital  signs and nursing notes.    ED Triage Vitals [11/19/21 0845]   Temp Heart Rate Resp BP SpO2   97.2 °F (36.2 °C) 103 18 139/82 97 %      Temp src Heart Rate Source Patient Position BP Location FiO2 (%)   -- -- -- -- --       Physical Exam    Physical Exam   Constitutional: No distress.  Chronically ill-appearing though not overtly toxic  HENT:  Head: Normocephalic and atraumatic.   Oropharynx: Mucous membranes are moist.   Eyes: No scleral icterus. No conjunctival pallor.  Neck: Painless range of motion noted. Neck supple.   Cardiovascular: Normal rate, regular rhythm and intact distal pulses.  Pulmonary/Chest: No respiratory distress. There are no wheezes, no rhonchi, and no rales.   Abdominal: Soft. There is moderate diffuse discomfort with palpation with focal tenderness suprapubic and left lower quadrant. There is no rebound and no guarding.   Musculoskeletal: Moves all extremities equally. There is no pedal edema or calf tenderness.  Healed surgical scars right forearm  Neurological: Alert.  Baseline strength and sensation noted.   Skin: Skin is pink, warm, and dry. No pallor.   Psychiatric: Mood and affect normal.   Nursing note and vitals reviewed.    LAB RESULTS  Recent Results (from the past 24 hour(s))   Comprehensive Metabolic Panel    Collection Time: 11/19/21  9:10 AM    Specimen: Blood   Result Value Ref Range    Glucose 104 (H) 65 - 99 mg/dL    BUN 9 8 - 23 mg/dL    Creatinine 0.68 (L) 0.76 - 1.27 mg/dL    Sodium 140 136 - 145 mmol/L    Potassium 4.0 3.5 - 5.2 mmol/L    Chloride 105 98 - 107 mmol/L    CO2 27.3 22.0 - 29.0 mmol/L    Calcium 8.5 (L) 8.6 - 10.5 mg/dL    Total Protein 7.3 6.0 - 8.5 g/dL    Albumin 3.90 3.50 - 5.20 g/dL    ALT (SGPT) 18 1 - 41 U/L    AST (SGOT) 19 1 - 40 U/L    Alkaline Phosphatase 108 39 - 117 U/L    Total Bilirubin 0.2 0.0 - 1.2 mg/dL    eGFR  African Amer 144 >60 mL/min/1.73    Globulin 3.4 gm/dL    A/G Ratio 1.1 g/dL    BUN/Creatinine Ratio 13.2 7.0 - 25.0    Anion Gap  7.7 5.0 - 15.0 mmol/L   Lipase    Collection Time: 11/19/21  9:10 AM    Specimen: Blood   Result Value Ref Range    Lipase 13 13 - 60 U/L   Type & Screen    Collection Time: 11/19/21  9:10 AM    Specimen: Blood   Result Value Ref Range    ABO Type O     RH type Positive     Antibody Screen Negative     T&S Expiration Date 11/22/2021 11:59:59 PM    CBC Auto Differential    Collection Time: 11/19/21  9:10 AM    Specimen: Blood   Result Value Ref Range    WBC 4.10 3.40 - 10.80 10*3/mm3    RBC 3.49 (L) 4.14 - 5.80 10*6/mm3    Hemoglobin 8.8 (L) 13.0 - 17.7 g/dL    Hematocrit 27.7 (L) 37.5 - 51.0 %    MCV 79.4 79.0 - 97.0 fL    MCH 25.2 (L) 26.6 - 33.0 pg    MCHC 31.8 31.5 - 35.7 g/dL    RDW 18.1 (H) 12.3 - 15.4 %    RDW-SD 51.7 37.0 - 54.0 fl    MPV 10.3 6.0 - 12.0 fL    Platelets 315 140 - 450 10*3/mm3    Neutrophil % 59.0 42.7 - 76.0 %    Lymphocyte % 24.4 19.6 - 45.3 %    Monocyte % 11.2 5.0 - 12.0 %    Eosinophil % 3.9 0.3 - 6.2 %    Basophil % 1.0 0.0 - 1.5 %    Immature Grans % 0.5 0.0 - 0.5 %    Neutrophils, Absolute 2.42 1.70 - 7.00 10*3/mm3    Lymphocytes, Absolute 1.00 0.70 - 3.10 10*3/mm3    Monocytes, Absolute 0.46 0.10 - 0.90 10*3/mm3    Eosinophils, Absolute 0.16 0.00 - 0.40 10*3/mm3    Basophils, Absolute 0.04 0.00 - 0.20 10*3/mm3    Immature Grans, Absolute 0.02 0.00 - 0.05 10*3/mm3    nRBC 0.0 0.0 - 0.2 /100 WBC   Hemoglobin A1c    Collection Time: 11/19/21  9:10 AM    Specimen: Blood   Result Value Ref Range    Hemoglobin A1C 6.00 (H) 4.80 - 5.60 %   COVID-19,[facility] IN-HOUSE [facility]PHEID/JOAQUÍN NP SWAB IN TRANSPORT MEDIA 8-12 HR TAT - Swab, Nasopharynx    Collection Time: 11/19/21 11:20 AM    Specimen: Nasopharynx; Swab   Result Value Ref Range    COVID19 Not Detected Not Detected - Ref. Range       Ordered the above labs and independently reviewed the results.        RADIOLOGY  CT Abdomen Pelvis With Contrast    Result Date: 11/19/2021  CT ABDOMEN AND PELVIS WITH CONTRAST  HISTORY: Abdominal discomfort with  blood-streaked stool.  TECHNIQUE: Axial CT images of the abdomen and pelvis were obtained following administration of intravenous contrast. The patient was not given oral contrast Coronal and sagittal reformats were obtained.  COMPARISON: 06/12/2014  FINDINGS: Circumferential wall thickening involving majority of the sigmoid colon with mucosal hyperenhancement is present. The findings are most suggestive of a colitis. No extraluminal air or fluid collection. No evidence of bowel obstruction. Moderate amount of formed stool is seen within the proximal colon. The appendix is normal.  There are patchy areas of airspace disease identified within the visualized lung fields particularly within the right lower lobe. Majority of these areas of ground glass density with accompanying interstitial thickening.  The liver demonstrates normal attenuation. There is a stable hypoattenuating left hepatic lobe lesion favored to represent a benign hemangioma. The gallbladder is normal. The spleen is normal. Pancreas is normal without ductal dilatation. Bilateral adrenal glands and kidneys are normal. The urinary bladder is partially distended with circumferential wall thickening. No pathological retroperitoneal lymphadenopathy.      1. CT findings of sigmoid colitis. 2. Areas of airspace disease within the visualized lower lung fields particularly severe within the right lower lobe. The patient has history of recent COVID pneumonia and is likely represent evolving sequela.  These findings were discussed with Dr. Ziggy Yeung by telephone at the time of dictation.  Radiation dose reduction techniques were utilized, including automated exposure control and exposure modulation based on body size.  This report was finalized on 11/19/2021 2:10 PM by Dr. Radha Delgado M.D.        I ordered the above noted radiological studies. Reviewed by me and discussed with radiologist.  See dictation for official radiology  interpretation.      PROCEDURES    Procedures        MEDICATIONS GIVEN IN ER    Medications   sodium chloride 0.9 % flush 10 mL (has no administration in time range)   ondansetron (ZOFRAN) injection 4 mg (has no administration in time range)   nitroglycerin (NITROSTAT) SL tablet 0.4 mg (has no administration in time range)   sodium chloride 0.9 % flush 10 mL (has no administration in time range)   sodium chloride 0.9 % flush 10 mL (has no administration in time range)   acetaminophen (TYLENOL) tablet 650 mg (has no administration in time range)     Or   acetaminophen (TYLENOL) 160 MG/5ML solution 650 mg (has no administration in time range)     Or   acetaminophen (TYLENOL) suppository 650 mg (has no administration in time range)   dextrose (GLUTOSE) oral gel 15 g (has no administration in time range)   dextrose (D50W) (25 g/50 mL) IV injection 25 g (has no administration in time range)   glucagon (human recombinant) (GLUCAGEN DIAGNOSTIC) injection 1 mg (has no administration in time range)   insulin lispro (ADMELOG) injection 0-9 Units (has no administration in time range)   HYDROmorphone (DILAUDID) injection 0.5 mg (0.5 mg Intravenous Given 11/19/21 0930)   iopamidol (ISOVUE-300) 61 % injection 85 mL (85 mL Intravenous Given by Other 11/19/21 1007)         PROGRESS, DATA ANALYSIS, CONSULTS, AND MEDICAL DECISION MAKING    My differential diagnosis for abdominal pain includes but is not limited to:  Gastritis, gastroenteritis, peptic ulcer disease, GERD, esophageal perforation, acute appendicitis, mesenteric adenitis, Meckel’s diverticulum, epiploic appendagitis, diverticulitis, colon cancer, ulcerative colitis, Crohn’s disease, intussusception, small bowel obstruction, adhesions, ischemic bowel, perforated viscus, ileus, obstipation, biliary colic, cholecystitis, cholelithiasis, Ian-Randolph Xavier, hepatitis, pancreatitis, common bile duct obstruction, cholangitis, bile leak, splenic trauma, splenic rupture, splenic  infarction, splenic abscess, abdominal abscess, ascites, spontaneous bacterial peritonitis, hernia, UTI, cystitis, prostatitis, ureterolithiasis, urinary obstruction, AAA, myocardial infarction, pneumonia, cancer, porphyria, DKA, medications, sickle cell, viral syndrome, zoster        All labs have been independently reviewed by me.  All radiology studies have been reviewed by me and discussed with radiologist dictating the report.   EKG's independently viewed and interpreted by me.  Discussion below represents my analysis of pertinent findings related to patient's condition, differential diagnosis, treatment plan and final disposition.      ED Course as of 11/19/21 1555   Fri Nov 19, 2021   0928 Hemoglobin(!): 8.8  Stable chronic anemia [RS]   0928 Platelets: 315 [RS]   0928 WBC: 4.10 [RS]   1059 Patient resting more comfortably at this time.  I have updated him with his lab results and CT result.  He reports he has never had a colonoscopy.  Plan admission for further evaluation and treatment.  Patient agreeable. [RS]   1118 CONSULT        Provider: Dr. Nichole - [facility]    Discussion: Reviewed patient history, ED presentation and evaluation.  Agreeable to accept patient for observation admission with telemetry.  He is aware of the pending GI consult.    Agreeable c treatment and planned disposition.       [RS]      ED Course User Index  [RS] Ziggy Yeung MD       AS OF 15:55 EST VITALS:    BP - 139/82  HR - 70  TEMP - 97.2 °F (36.2 °C)  O2 SATS - 99%        DIAGNOSIS  Final diagnoses:   Acute colitis   Bright red blood per rectum   Chronic anemia   Chronic anticoagulation   Chronic hypoxemic respiratory failure (HCC)   History of 2019 novel coronavirus disease (COVID-19)         DISPOSITION  ADMISSION    Discussed treatment plan and reason for admission with pt/family and admitting physician.  Pt/family voiced understanding of the plan for admission for further testing/treatment as needed.          Ziggy Yeung,  MD  11/19/21 1556      Electronically signed by Ziggy Yeung MD at 11/19/21 1556     Dima Gloria, RN at 11/19/21 1406        Attempted report, receiving RN unavailable.  Will re-attempt in 10 minutes.     Dima Gloria, RN  11/19/21 1405      Electronically signed by Dima Gloria, RN at 11/19/21 9952

## 2021-11-22 NOTE — TELEPHONE ENCOUNTER
Call pt on 11/22 [facility] to schedule 4-6 wk hosp f/u w/Verito or Dr. Howell

## 2021-11-23 VITALS
OXYGEN SATURATION: 95 % | WEIGHT: 235.8 LBS | BODY MASS INDEX: 30.26 KG/M2 | TEMPERATURE: 99.3 F | SYSTOLIC BLOOD PRESSURE: 123 MMHG | RESPIRATION RATE: 16 BRPM | HEART RATE: 75 BPM | HEIGHT: 74 IN | DIASTOLIC BLOOD PRESSURE: 78 MMHG

## 2021-11-23 LAB
ALBUMIN SERPL-MCNC: 3.2 G/DL (ref 3.5–5.2)
ALBUMIN/GLOB SERPL: 1 G/DL
ALP SERPL-CCNC: 117 U/L (ref 39–117)
ALT SERPL W P-5'-P-CCNC: 14 U/L (ref 1–41)
ANION GAP SERPL CALCULATED.3IONS-SCNC: 6.4 MMOL/L (ref 5–15)
AST SERPL-CCNC: 19 U/L (ref 1–40)
BILIRUB SERPL-MCNC: 0.2 MG/DL (ref 0–1.2)
BUN SERPL-MCNC: 5 MG/DL (ref 8–23)
BUN/CREAT SERPL: 7.7 (ref 7–25)
CALCIUM SPEC-SCNC: 8.3 MG/DL (ref 8.6–10.5)
CHLORIDE SERPL-SCNC: 106 MMOL/L (ref 98–107)
CO2 SERPL-SCNC: 28.6 MMOL/L (ref 22–29)
CREAT SERPL-MCNC: 0.65 MG/DL (ref 0.76–1.27)
DEPRECATED RDW RBC AUTO: 54.3 FL (ref 37–54)
ERYTHROCYTE [DISTWIDTH] IN BLOOD BY AUTOMATED COUNT: 18.4 % (ref 12.3–15.4)
GFR SERPL CREATININE-BSD FRML MDRD: >150 ML/MIN/1.73
GLOBULIN UR ELPH-MCNC: 3.2 GM/DL
GLUCOSE BLDC GLUCOMTR-MCNC: 120 MG/DL (ref 70–130)
GLUCOSE SERPL-MCNC: 77 MG/DL (ref 65–99)
HCT VFR BLD AUTO: 28.7 % (ref 37.5–51)
HGB BLD-MCNC: 8.5 G/DL (ref 13–17.7)
MCH RBC QN AUTO: 24 PG (ref 26.6–33)
MCHC RBC AUTO-ENTMCNC: 29.6 G/DL (ref 31.5–35.7)
MCV RBC AUTO: 81.1 FL (ref 79–97)
PLATELET # BLD AUTO: 360 10*3/MM3 (ref 140–450)
PMV BLD AUTO: 10.7 FL (ref 6–12)
POTASSIUM SERPL-SCNC: 4 MMOL/L (ref 3.5–5.2)
PROT SERPL-MCNC: 6.4 G/DL (ref 6–8.5)
RBC # BLD AUTO: 3.54 10*6/MM3 (ref 4.14–5.8)
SODIUM SERPL-SCNC: 141 MMOL/L (ref 136–145)
WBC NRBC COR # BLD: 4.52 10*3/MM3 (ref 3.4–10.8)

## 2021-11-23 PROCEDURE — 97161 PT EVAL LOW COMPLEX 20 MIN: CPT

## 2021-11-23 PROCEDURE — 80053 COMPREHEN METABOLIC PANEL: CPT | Performed by: NURSE PRACTITIONER

## 2021-11-23 PROCEDURE — 85027 COMPLETE CBC AUTOMATED: CPT | Performed by: NURSE PRACTITIONER

## 2021-11-23 PROCEDURE — 82962 GLUCOSE BLOOD TEST: CPT

## 2021-11-23 RX ORDER — VANCOMYCIN HYDROCHLORIDE 125 MG/1
125 CAPSULE ORAL EVERY 6 HOURS SCHEDULED
Qty: 28 CAPSULE | Refills: 0 | Status: SHIPPED | OUTPATIENT
Start: 2021-11-23 | End: 2021-11-30

## 2021-11-23 RX ADMIN — CARVEDILOL 3.12 MG: 3.12 TABLET, FILM COATED ORAL at 08:50

## 2021-11-23 RX ADMIN — OXYCODONE AND ACETAMINOPHEN 1 TABLET: 5; 325 TABLET ORAL at 13:14

## 2021-11-23 RX ADMIN — DULOXETINE HYDROCHLORIDE 30 MG: 30 CAPSULE, DELAYED RELEASE ORAL at 08:50

## 2021-11-23 RX ADMIN — OXYCODONE AND ACETAMINOPHEN 1 TABLET: 5; 325 TABLET ORAL at 06:49

## 2021-11-23 RX ADMIN — CLONAZEPAM 0.5 MG: 0.5 TABLET ORAL at 08:54

## 2021-11-23 RX ADMIN — PRAVASTATIN SODIUM 40 MG: 40 TABLET ORAL at 08:50

## 2021-11-23 RX ADMIN — VANCOMYCIN HYDROCHLORIDE 125 MG: 125 CAPSULE ORAL at 00:30

## 2021-11-23 RX ADMIN — VANCOMYCIN HYDROCHLORIDE 125 MG: 125 CAPSULE ORAL at 06:49

## 2021-11-23 RX ADMIN — DICYCLOMINE HYDROCHLORIDE 10 MG: 10 CAPSULE ORAL at 08:50

## 2021-11-23 RX ADMIN — CLONIDINE HYDROCHLORIDE 0.1 MG: 0.1 TABLET ORAL at 08:50

## 2021-11-23 RX ADMIN — SODIUM CHLORIDE, PRESERVATIVE FREE 10 ML: 5 INJECTION INTRAVENOUS at 08:50

## 2021-11-23 NOTE — DISCHARGE SUMMARY
Date of Admission: 11/19/2021  Date of Discharge:  11/23/2021  Primary Care Physician: Provider, No Known     Discharge Diagnosis:  Active Hospital Problems    Diagnosis  POA   • **C. difficile colitis [A04.72]  Unknown   • Acute colitis [K52.9]  Yes   • Hematochezia [K92.1]  Yes   • Anticoagulated [Z79.01]  Not Applicable   • Methadone dependence (HCC) [F11.20]  Yes   • History of COVID-19 [Z86.16]  Unknown   • Polysubstance abuse (HCC) [F19.10]  Yes   • Iron deficiency anemia [D50.9]  Yes   • History of methicillin resistant staphylococcus aureus (MRSA) [Z86.14]  Yes   • PTSD (post-traumatic stress disorder) [F43.10]  Yes   • Hypertension [I10]  Yes   • Type 2 diabetes mellitus, without long-term current use of insulin (HCC) [E11.9]  Yes      Resolved Hospital Problems   No resolved problems to display.       DETAILS OF HOSPITAL STAY     Pertinent Test Results and Procedures Performed  CT scan of the abdomen and pelvis:  1. CT findings of sigmoid colitis.   2. Areas of airspace disease within the visualized lower lung fields   particularly severe within the right lower lobe. The patient has history   of recent COVID pneumonia and is likely represent evolving sequela.     Hospital Course  This is a 61-year-old male who presented to the emergency room with complaints of abdominal pain bloody diarrhea.  Please see H&P for full details of admission.  CT scan showed acute sigmoid colitis as described above.  He tested positive for C. difficile and was started on oral vancomycin to which she has responded nicely with resolution of bloody stool.  GI did evaluate and recommend he have a colonoscopy in a couple of months once he is over the acute infection.  He is now having only 3 semiformed bowel movements per day.  He is medically stable and will be released home.  Of note, he was recently discharged from [facility] for COVID-19 pneumonia.  He required oxygen upon discharge at that time and remains on 2 L now.   He will be discharged on oxygen once again with his home supply.  He was also sent out from that hospitalization on Eliquis for elevated D-dimer without evidence of DVT or PE.  D-dimer minimally elevated upon our check here and I do not see an indication for ongoing anticoagulation particularly in the setting of lower GI bleed.  Eliquis was discontinued and he will remain off of this at discharge.  At this point he is medically stable and will be released.    Physical Exam at Discharge:  General: No acute distress, AAOx3, chronically ill-appearing  HEENT: EOMI, PERRL  Cardiovascular: +s1 and s2, RRR  Lungs: No rhonchi or wheezing  Abdomen: soft, nontender    Consults:   Consults     Date and Time Order Name Status Description    11/19/2021 11:22 AM Inpatient Gastroenterology Consult      11/19/2021 10:59 AM Gastroenterology (on-call MD unless specified)      11/19/2021 10:59 AM [facility] (on-call MD unless specified) Details              Condition on Discharge: Stable    Discharge Disposition  Home or Self Care    Discharge Medications     Discharge Medications      New Medications      Instructions Start Date   vancomycin 125 MG capsule  Commonly known as: VANCOCIN   125 mg, Oral, Every 6 Hours Scheduled         Continue These Medications      Instructions Start Date   carvedilol 3.125 MG tablet  Commonly known as: COREG   3.125 mg, Oral, 2 Times Daily With Meals      clonazePAM 0.5 MG tablet  Commonly known as: KlonoPIN   0.5 mg, Oral, 2 Times Daily PRN      cloNIDine 0.1 MG tablet  Commonly known as: CATAPRES   0.1 mg, Oral, 3 Times Daily      DULoxetine 30 MG capsule  Commonly known as: CYMBALTA   30 mg, Oral, Daily      furosemide 40 MG tablet  Commonly known as: LASIX   Oral, Daily      hydrOXYzine 50 MG tablet  Commonly known as: ATARAX   50 mg, Oral, 3 Times Daily PRN      insulin detemir 100 UNIT/ML injection  Commonly known as: LEVEMIR   20 Units, Subcutaneous, Daily, in am       metFORMIN 500 MG  tablet  Commonly known as: GLUCOPHAGE   500 mg, Oral, 2 Times Daily With Meals      methadone 10 MG tablet  Commonly known as: DOLOPHINE   20 mg, Oral, 2 Times Daily      pravastatin 20 MG tablet  Commonly known as: PRAVACHOL   40 mg, Oral, Daily      Zinc Sulfate 220 (50 Zn) MG tablet   220 mg, Oral         Stop These Medications    apixaban 5 MG tablet tablet  Commonly known as: ELIQUIS     aspirin 81 MG chewable tablet            Discharge Diet:   Diet Instructions     Diet: Regular, Consistent Carbohydrate      Discharge Diet:  Regular  Consistent Carbohydrate             Activity at Discharge:   Activity Instructions     Activity as Tolerated            Follow-up Appointments  Future Appointments   Date Time Provider Department Center   12/9/2021  8:45 AM Glenn Taylor DO [facility]     Additional Instructions for the Follow-ups that You Need to Schedule     Discharge Follow-up with PCP   As directed       Currently Documented PCP:    Provider, No Known    PCP Phone Number:    None     Follow Up Details: 1 week         Discharge Follow-up with Specialty: [facility] in 6 to 8 weeks for consideration of colonoscopy   As directed      Specialty: [facility] in 6 to 8 weeks for consideration of colonoscopy               I have examined and discussed discharge planning with the patient today.    I wore full protective equipment throughout the patient encounter including eye protection and facemask.  Hand hygiene was performed before donning protective equipment and after removal when leaving the room.     Jeb Nichole MD  11/23/21  09:58 EST    Time: Discharge greater than 30 min

## 2021-11-23 NOTE — PAYOR COMM NOTE
"DISCHARGED  REF #[id number]        Kwame Andres (61 y.o. Male)             Date of Birth Social Security Number Address Home Phone MRN    1960  [id number] [id number] [phone] [id number]    [employer] Marital Status             None Single       Admission Date Admission Type Admitting Provider Attending Provider Department, Room/Bed    11/19/21 Emergency Sanchez Mcelroy MD  [address], [address]    Discharge Date Discharge Disposition Discharge Destination          11/23/2021 Home or Self Care              Attending Provider: (none)   Allergies: Shrimp    Isolation: Spore   Infection: MRSA/History Only (11/19/21), COVID (History) (11/19/21), C.difficile (11/20/21)   Code Status: CPR   Advance Care Planning Activity    Ht: 188 cm (74\")   Wt: 107 kg (235 lb 12.8 oz)    Admission Cmt: None   Principal Problem: C. difficile colitis [A04.72]                 Active Insurance as of 11/19/2021     Primary Coverage     Payor Plan Insurance Group Employer/Plan Group    [facility] BY MARJ [id number]     Payor Plan Address Payor Plan Phone Number Payor Plan Fax Number Effective Dates    [address]   1/1/2021 - None Entered    [place] [id number]       Subscriber Name Subscriber Birth Date Member ID       KWAME ANDRES 1960 [id number]                 Emergency Contacts      (Rel.) Home Phone Work Phone Mobile Phone    Jose Angel Olson (Father) -- -- [phone]            Discharge Order (From admission, onward)     Start     Ordered    11/23/21 0956  Discharge patient  Once        Expected Discharge Date: 11/23/21    Discharge Disposition: Home or Self Care    Physician of Record for Attribution - Please select from Treatment Team: SANCHEZ MCELROY [[id number]]    Review needed by CMO to determine Physician of Record: No       Question Answer Comment   Physician of Record for Attribution - Please select from " Treatment Team SANCHEZ MCELROY    Review needed by CMO to determine Physician of Record No        11/23/21 0975

## 2021-11-23 NOTE — PLAN OF CARE
Goal Outcome Evaluation:  Plan of Care Reviewed With: patient        Progress: no change  Outcome Summary: Pt is a 62 yo male admitted 11/19/21 with C. difficile colitis. PMH includes but not limited to: T2DM, HTN, PTSD, polysubstance abuse. Pt is A&Ox4 and reports he lives with sister in apartment, was using SC for mobility, and was on 3.5-4L O2 prior to admission. Pt currently transfers and ambulates 60ft with RW assist x 1 demonstrating SOA and impaired endurance. Pt O2 saturation decreasd to 87% after ambulation bout requiring ~1 minute seated rest break to recover >90%. Pt could continue to benefit from skilled PT to address mobility deficits. PT recommends  PT to facilitate progress.    Patient was not wearing a face mask during this therapy encounter. Therapist used appropriate personal protective equipment including gown, eye protection, mask and gloves.  Mask used was standard procedure mask. Appropriate PPE was worn during the entire therapy session. Hand hygiene was completed before and after therapy session. Patient is not in enhanced droplet precautions.

## 2021-11-23 NOTE — THERAPY EVALUATION
Patient Name: Kwame Andres  : 1960    MRN: [id number]                              Today's Date: 2021       Admit Date: 2021    Visit Dx:     ICD-10-CM ICD-9-CM   1. Acute colitis  K52.9 558.9   2. Bright red blood per rectum  K62.5 569.3   3. Chronic anemia  D64.9 285.9   4. Chronic anticoagulation  Z79.01 V58.61   5. Chronic hypoxemic respiratory failure (HCC)  J96.11 518.83     799.02   6. History of 2019 novel coronavirus disease (COVID-19)  Z86.16 V12.09     Patient Active Problem List   Diagnosis   • Chest pain at rest   • Chronic pain   • Type 2 diabetes mellitus, without long-term current use of insulin (HCC)   • Hypertension   • DDD (degenerative disc disease), lumbar   • Pulmonary nodule, needs follow up PET scan as outpatient   • Drug-seeking behavior   • Depression with suicidal ideation   • History of methicillin resistant staphylococcus aureus (MRSA)   • PTSD (post-traumatic stress disorder)   • Opioid use disorder, moderate, in sustained remission (HCC)   • Iron deficiency anemia   • Cellulitis of left hand   • Acute colitis   • Hematochezia   • Anticoagulated   • Polysubstance abuse (HCC)   • Methadone dependence (HCC)   • History of COVID-19   • C. difficile colitis     Past Medical History:   Diagnosis Date   • Arthritis    • Chronic pain    • Diabetes mellitus (HCC)    • Herniated thoracic disc without myelopathy     by right scapula   • Hypertension    • Narcotic dependence (HCC)    • Pneumonia    • PTSD (post-traumatic stress disorder)    • Torn Achilles tendon      Past Surgical History:   Procedure Laterality Date   • HIP SURGERY Right       General Information     Row Name 21 1110          Physical Therapy Time and Intention    Document Type evaluation  -SR     Mode of Treatment physical therapy; individual therapy  -SR     Row Name 21 1110          General Information    Patient Profile Reviewed yes  -SR     Prior Level of Function min assist:; ADL's;  independent:; gait  uses SC  -SR     Existing Precautions/Restrictions fall; oxygen therapy device and L/min  3.5-4L O2  -SR     Barriers to Rehab previous functional deficit  -SR     Row Name 11/23/21 1110          Living Environment    Lives With sibling(s)  -SR     Row Name 11/23/21 1110          Home Main Entrance    Number of Stairs, Main Entrance none  -SR     Row Name 11/23/21 1110          Stairs Within Home, Primary    Stairs, Within Home, Primary lives in apt  -SR     Row Name 11/23/21 1110          Cognition    Orientation Status (Cognition) oriented x 4  -SR     Row Name 11/23/21 1110          Safety Issues, Functional Mobility    Impairments Affecting Function (Mobility) shortness of breath; endurance/activity tolerance  -SR     Comment, Safety Issues/Impairments (Mobility) gait belt and non slip socks for safety  -SR           User Key  (r) = Recorded By, (t) = Taken By, (c) = Cosigned By    Initials Name Provider Type    SR Candi De La Torre Physical Therapist               Mobility     Row Name 11/23/21 1111          Bed Mobility    Bed Mobility supine-sit; sit-supine  -SR     Supine-Sit [place] (Bed Mobility) standby assist  -SR     Sit-Supine [place] (Bed Mobility) standby assist  -SR     Assistive Device (Bed Mobility) head of bed elevated; bed rails  -SR     Row Name 11/23/21 1111          Transfers    Comment (Transfers) sit <> stand with RW CGA with cues for hand placement during transition  -SR     Row Name 11/23/21 1111          Sit-Stand Transfer    Sit-Stand [place] (Transfers) contact guard; 1 person assist; verbal cues  -SR     Assistive Device (Sit-Stand Transfers) walker, front-wheeled  -SR     Row Name 11/23/21 1111          Gait/Stairs (Locomotion)    [place] Level (Gait) contact guard; 1 person assist; verbal cues  -SR     Assistive Device (Gait) walker, front-wheeled  -SR     Distance in Feet (Gait) 60ft  -SR     Deviations/Abnormal Patterns (Gait)  festinating/shuffling; gait speed decreased  -SR     Bilateral Gait Deviations forward flexed posture  -SR     Comment (Gait/Stairs) ambualted 60ft with RW CGA with cues for positioning of AD, no LOB, distance limited by SOA. [id number] decreased to 87% requiring ~1min seated rest break and cues for PLB to recover to >90%  -SR           User Key  (r) = Recorded By, (t) = Taken By, (c) = Cosigned By    Initials Name Provider Type    SR Candi De La Torre Physical Therapist               Obj/Interventions     Row Name 11/23/21 1112          Range of Motion Comprehensive    General Range of Motion no range of motion deficits identified  -SR     Row Name 11/23/21 1112          Strength Comprehensive (MMT)    General Manual Muscle Testing (MMT) Assessment no strength deficits identified  -SR     Row Name 11/23/21 1112          Balance    Balance Assessment sitting static balance; sitting dynamic balance; standing static balance; standing dynamic balance  -SR     Static Sitting Balance WFL; sitting, edge of bed  -SR     Dynamic Sitting Balance WFL; sitting, edge of bed  -SR     Static Standing Balance WFL; supported  -SR     Dynamic Standing Balance WFL; supported  -SR     Comment, Balance no balance impairments during session  -SR           User Key  (r) = Recorded By, (t) = Taken By, (c) = Cosigned By    Initials Name Provider Type    SR Candi De La Torre Physical Therapist               Goals/Plan     Row Name 11/23/21 1116          Bed Mobility Goal 1 (PT)    Activity/Assistive Device (Bed Mobility Goal 1, PT) bed mobility activities, all  -SR     [place] Level/Cues Needed (Bed Mobility Goal 1, PT) independent  -SR     Time Frame (Bed Mobility Goal 1, PT) 1 week  -SR     Row Name 11/23/21 1116          Transfer Goal 1 (PT)    Activity/Assistive Device (Transfer Goal 1, PT) transfers, all  -SR     [place] Level/Cues Needed (Transfer Goal 1, PT) independent  -SR     Time Frame (Transfer Goal 1, PT) 1 week  -SR      Row Name 11/23/21 1116          Gait Training Goal 1 (PT)    Activity/Assistive Device (Gait Training Goal 1, PT) gait (walking locomotion)  -SR     [place] Level (Gait Training Goal 1, PT) standby assist  -SR     Distance (Gait Training Goal 1, PT) 150  -SR     Time Frame (Gait Training Goal 1, PT) 1 week  -SR           User Key  (r) = Recorded By, (t) = Taken By, (c) = Cosigned By    Initials Name Provider Type    SR Candi De La Torre Physical Therapist               Clinical Impression     Row Name 11/23/21 1113          Pain    Additional Documentation Pain Scale: Numbers Pre/Post-Treatment (Group)  -SR     Row Name 11/23/21 1113          Pain Scale: Numbers Pre/Post-Treatment    Pretreatment Pain Rating 0/10 - no pain  -SR     Posttreatment Pain Rating 0/10 - no pain  -SR     Row Name 11/23/21 1113          Plan of Care Review    Plan of Care Reviewed With patient  -SR     Progress no change  -SR     Outcome Summary Pt is a 60 yo male admitted 11/19/21 with C. difficile colitis. PMH includes but not limited to: T2DM, HTN, PTSD, polysubstance abuse. Pt is A&Ox4 and reports he lives with sister in apartment, was using SC for mobility, and was on 3.5-4L O2 prior to admission. Pt currently transfers and ambulates 60ft with RW assist x 1 demonstrating SOA and impaired endurance. Pt O2 saturation decreasd to 87% after ambulation bout requiring ~1 minute seated rest break to recover >90%. Pt could continue to benefit from skilled PT to address mobility deficits. PT recommends  PT to facilitate progress.  -SR     Row Name 11/23/21 1113          Therapy Assessment/Plan (PT)    Patient/Family Therapy Goals Statement (PT) no goal stated  -SR     Rehab Potential (PT) good, to achieve stated therapy goals  -SR     Criteria for Skilled Interventions Met (PT) yes; meets criteria  -SR     Predicted Duration of Therapy Intervention (PT) 1 week  -SR     Row Name 11/23/21 1113          Vital Signs    O2 Delivery Pre Treatment  supplemental O2  -SR     O2 Delivery Intra Treatment supplemental O2  -SR     O2 Delivery Post Treatment supplemental O2  -SR     Row Name 11/23/21 1113          Positioning and Restraints    Pre-Treatment Position in bed  -SR     Post Treatment Position bed  -SR     In Bed fowlers; call light within reach; encouraged to call for assist; exit alarm on; notified nsg  -SR           User Key  (r) = Recorded By, (t) = Taken By, (c) = Cosigned By    Initials Name Provider Type    Candi Levin Physical Therapist               Outcome Measures     Row Name 11/23/21 1116          How much help from another person do you currently need...    Turning from your back to your side while in flat bed without using bedrails? 4  -SR     Moving from lying on back to sitting on the side of a flat bed without bedrails? 3  -SR     Moving to and from a bed to a chair (including a wheelchair)? 3  -SR     Standing up from a chair using your arms (e.g., wheelchair, bedside chair)? 3  -SR     Climbing 3-5 steps with a railing? 3  -SR     To walk in hospital room? 3  -SR     AM-PAC 6 Clicks Score (PT) 19  -SR     Row Name 11/23/21 1116          Functional Assessment    Outcome Measure Options AM-PAC 6 Clicks Basic Mobility (PT)  -SR           User Key  (r) = Recorded By, (t) = Taken By, (c) = Cosigned By    Initials Name Provider Type    Candi Levin Physical Therapist                             Physical Therapy Education                 Title: PT OT SLP Therapies (In Progress)     Topic: Physical Therapy (In Progress)     Point: Mobility training (Done)     Learning Progress Summary           Patient Acceptance, E, VU by SR at 11/23/2021 1117                   Point: Home exercise program (Not Started)     Learner Progress:  Not documented in this visit.          Point: Body mechanics (Done)     Learning Progress Summary           Patient Acceptance, E, VU by SR at 11/23/2021 1117                   Point: Precautions (Done)      Learning Progress Summary           Patient Acceptance, E, VU by  at 11/23/2021 1117                               User Key     Initials Effective Dates Name Provider Type Discipline     02/08/21 -  Candi De La Torre Physical Therapist PT              PT Recommendation and Plan  Planned Therapy Interventions (PT): balance training, bed mobility training, gait training, home exercise program, manual therapy techniques, neuromuscular re-education, postural re-education, ROM (range of motion), strengthening, stretching, transfer training  Plan of Care Reviewed With: patient  Progress: no change  Outcome Summary: Pt is a 60 yo male admitted 11/19/21 with C. difficile colitis. PMH includes but not limited to: T2DM, HTN, PTSD, polysubstance abuse. Pt is A&Ox4 and reports he lives with sister in apartment, was using SC for mobility, and was on 3.5-4L O2 prior to admission. Pt currently transfers and ambulates 60ft with RW assist x 1 demonstrating SOA and impaired endurance. Pt O2 saturation decreasd to 87% after ambulation bout requiring ~1 minute seated rest break to recover >90%. Pt could continue to benefit from skilled PT to address mobility deficits. PT recommends  PT to facilitate progress.     Time Calculation:    PT Charges     Row Name 11/23/21 1117             Time Calculation    Start Time 1020  -SR      Stop Time 1038  -SR      Time Calculation (min) 18 min  -SR      PT Received On 11/23/21  -      PT - Next Appointment 11/24/21  -      PT Goal Re-Cert Due Date 11/30/21  -              Time Calculation- PT    Total Timed Code Minutes- PT 18 minute(s)  -SR            User Key  (r) = Recorded By, (t) = Taken By, (c) = Cosigned By    Initials Name Provider Type    SR Candi De La Torre Physical Therapist              Therapy Charges for Today     Code Description Service Date Service Provider Modifiers Qty    [id number] HC PT EVAL LOW COMPLEXITY 2 11/23/2021 Candi De La Torre GP 1          PT G-Codes  Outcome  Measure Options: AM-PAC 6 Clicks Basic Mobility (PT)  -[facility] 6 Clicks Score (PT): 19    Candi De La Torre  11/23/2021

## 2021-11-23 NOTE — CASE MANAGEMENT/SOCIAL WORK
Continued Stay Note  [facility]     Patient Name: Kwame Andres  MRN: [id number]  Today's Date: 11/23/2021    Admit Date: 11/19/2021     Discharge Plan     Row Name 11/23/21 2447       Plan    Plan Comments DC orders noted.  S/W pt and his sister who state pt is returning home.  CCP discussed possible HH, but pt does not have a PCP until his appointment on Dec 9 with his new PCP.  Pt encouraged to discuss HH with the provider at that appointment.  Pt has home O2 thru [place], but does not have a portable tank to use for the drive home.  Luigi/ Mary notified and she did deliver a portable tank to pt.  Pt and his sister do not have a ride home.  Cab voucher given and arranged by RN. ...........sk    Final Discharge Disposition Code 01 - home or self-care    Final Note DC home.               Discharge Codes    No documentation.               Expected Discharge Date and Time     Expected Discharge Date Expected Discharge Time    Nov 23, 2021             Agnes Dumont RN

## 2021-11-23 NOTE — PLAN OF CARE
Problem: Adult Inpatient Plan of Care  Goal: Plan of Care Review  Outcome: Adequate for Care Transition  Goal: Patient-Specific Goal (Individualized)  Outcome: Adequate for Care Transition  Goal: Absence of Hospital-Acquired Illness or Injury  Outcome: Adequate for Care Transition  Intervention: Identify and Manage Fall Risk  Description: Perform standard risk assessment with a validated tool or comprehensive approach appropriate to the patient on admission; reassess fall risk frequently, with change in status or transfer to another level of care.  Communicate fall injury risk to interprofessional healthcare team.  Determine need for increased observation, equipment and environmental modification, such as low bed and signage, as well as supportive, nonskid footwear.  Adjust safety measures to individual developmental age, stage and identified risk factors.  Reinforce the importance of safety and physical activity with patient and family.  Perform regular intentional rounding to assess need for position change, pain assessment, personal needs, including assistance with toileting.  Recent Flowsheet Documentation  Taken 11/23/2021 4693 by Leonarda Allen RN  Safety Promotion/Fall Prevention:   safety round/check completed   room organization consistent   nonskid shoes/slippers when out of bed  Intervention: Prevent Skin Injury  Description: Assess skin risk on admission and at regular intervals throughout hospital stay.  Keep all areas of skin (especially folds) clean and dry.  Maintain adequate skin hydration.  Relieve and redistribute pressure and protect bony prominences; implement measures based on patient-specific risk factors.  Match turning and repositioning schedule to clinical condition.  Encourage weight shift frequently; assist with reposition if unable to complete independently.  Float heels off bed. Avoid pressure on the Achilles tendon.  Keep skin free from extended contact with medical  devices.  Use aids (e.g., slide boards, mechanical lift) during transfer.  Recent Flowsheet Documentation  Taken 11/23/2021 0850 by Leonarda Allen RN  Body Position:   position changed independently   side-lying, left  Skin Protection:   adhesive use limited   incontinence pads utilized   tubing/devices free from skin contact  Intervention: Prevent and Manage VTE (venous thromboembolism) Risk  Description: Assess for VTE risk.  Encourage/assist with early ambulation.  Initiate and maintain compression or other therapy, as indicated based on identified risk in accordance with organizational protocol/provider order.  Encourage both active and passive leg exercises while in bed, if unable to ambulate.  Recent Flowsheet Documentation  Taken 11/23/2021 0850 by Leonarda Allen RN  VTE Prevention/Management:   bilateral   dorsiflexion/plantar flexion performed  Intervention: Prevent Infection  Description: Maintain skin and mucous membrane integrity; promote hand, oral and pulmonary hygiene.  Optimize fluid balance, nutrition, sleep and glycemic control to maximize infection resistance.  Identify potential sources of infection early to prevent or mitigate progression of infection (e.g., wound, lines, devices).  Evaluate ongoing need for invasive devices; remove promptly when no longer indicated.  Recent Flowsheet Documentation  Taken 11/23/2021 1000 by Leonarda Allen RN  Infection Prevention:   hand hygiene promoted   personal protective equipment utilized   single patient room provided   visitors restricted/screened  Taken 11/23/2021 0800 by Leonarda Allen RN  Infection Prevention:   hand hygiene promoted   personal protective equipment utilized   single patient room provided   visitors restricted/screened  Goal: Optimal Comfort and Wellbeing  Outcome: Adequate for Care Transition  Intervention: Provide Person-Centered Care  Description: Use a family-focused approach to care.  Develop trust and rapport  by proactively providing information, encouraging questions, addressing concerns and offering reassurance.  Acknowledge emotional response to hospitalization.  Recognize and utilize personal coping strategies.  Honor spiritual and cultural preferences.  Recent Flowsheet Documentation  Taken 11/23/2021 0850 by Leonarda Allen RN  Trust Relationship/Rapport:   care explained   choices provided   emotional support provided   empathic listening provided   questions answered   questions encouraged   reassurance provided   thoughts/feelings acknowledged  Goal: Readiness for Transition of Care  Outcome: Adequate for Care Transition     Problem: Diabetes Comorbidity  Goal: Blood Glucose Level Within Desired Range  Outcome: Adequate for Care Transition     Problem: Hypertension Comorbidity  Goal: Blood Pressure in Desired Range  Outcome: Adequate for Care Transition  Intervention: Maintain Hypertension-Management Strategies  Description: Evaluate adherence to home antihypertensive regimen (e.g., exercise and activity, diet modification, medication).  Provide scheduled antihypertensive medication; consider administration time and effects (e.g., avoid giving diuretic prior to bedtime).  Monitor response to medication therapy (e.g., blood pressure, electrolyte level, medication effects).  Minimize risk of orthostatic hypotension; encourage caution with position changes, particularly if elderly  Recent Flowsheet Documentation  Taken 11/23/2021 0850 by Leonarda Allen RN  Medication Review/Management: medications reviewed     Problem: Pain Chronic (Persistent) (Comorbidity Management)  Goal: Acceptable Pain Control and Functional Ability  Outcome: Adequate for Care Transition  Intervention: Manage Persistent Pain  Description: Evaluate pain level, effect of treatment and patient response at regular intervals.  Note: Response to pain may diminish over time. This does not indicate that pain is absent.  Minimize pain  stimuli; coordinate care and adjust environment (e.g., light, noise, unnecessary movement); promote sleep/rest.  Match pharmacologic analgesia to severity and type of pain mechanism (e.g., neuropathic, muscle, inflammatory); consider multimodal approach (e.g., nonopioid, opioid, adjuvant).  Provide medication at regular intervals; titrate to patient response.  Manage breakthrough pain with additional doses; consider rotation or switching medication.  Monitor for signs of substance tolerance (increased dose to reach desired effect, decreased effect with same dose).  Avoid abrupt withdrawal of medication, especially agents capable of causing physical dependence.  Manage medication-induced effects, such as constipation, nausea, urinary retention, somnolence and dizziness.  Consider nonpharmacologic pain interventions to improve function (e.g., massage, transcutaneous electrical nerve stimulation, vibration, heat or cold application, immobilization, hydrotherapy).  Consider addition of complementary or alternative therapy, such as acupuncture, hypnosis or therapeutic touch.  Recent Flowsheet Documentation  Taken 11/23/2021 0850 by Leonarda Allen RN  Medication Review/Management: medications reviewed  Intervention: Optimize Psychosocial Wellbeing  Description: Facilitate patient’s self-control over pain by providing pain information and allowing choices in treatment.  Consider and address emotional response to pain.  Explore and promote use of coping strategies; address barriers to successful coping.  Evaluate and assist with psychosocial, cultural and spiritual factors impacting pain.  Modify pain perception by using cognitive-behavioral techniques, such as distraction, guided imagery, meditation, relaxation or music.  Recent Flowsheet Documentation  Taken 11/23/2021 0850 by Leonarda Allen RN  Diversional Activities: television     Problem: Fall Injury Risk  Goal: Absence of Fall and Fall-Related  Injury  Outcome: Adequate for Care Transition  Intervention: Identify and Manage Contributors to Fall Injury Risk  Description: Reassess fall risk frequently and with change in status or transfer to another level of care.  Communicate fall injury risk to all healthcare team members (e.g., rounds, change of shift/provider, patient transport).  Anticipate needs; perform regular intentional rounding to assess need for position change, pain assessment, personal needs (e.g., toileting) and placement of necessary items.  Provide reorientation, appropriate sensory stimulation and routines with changes in mental status to decrease risk of fall.  Promote use of personal vision and auditory aids (e.g., glasses, hearing aids).  Assess assistance level required for safe and effective care; provide support as needed (e.g., toileting, bathing, mobilization).  Define behavior and activity limits to patient and family.  If fall occurs, assess for and treat injury; determine cause; revise fall injury prevention plan.  Regularly review medication contribution to fall risk; adjust medication administration times to minimize risk of falling.  Consider risk related to polypharmacy and age.  Balance adequate pain management with potential for oversedation.  Recent Flowsheet Documentation  Taken 11/23/2021 0850 by Leonarda Allen, RN  Medication Review/Management: medications reviewed  Intervention: Promote Injury-Free Environment  Description: Provide a safe, barrier-free environment that encourages independent activity.  Keep care area uncluttered and well-lighted.  Determine need for increased observation or auditory alerts (e.g., bed or chair alarm).  Assess equipment and environmental modification needs (e.g., low bed, signage, nonskid footwear, grab bars).  Avoid use of restraints.  Recent Flowsheet Documentation  Taken 11/23/2021 0850 by Leonarda Allen, RN  Safety Promotion/Fall Prevention:   safety round/check completed    room organization consistent   nonskid shoes/slippers when out of bed   Goal Outcome Evaluation:

## 2021-11-23 NOTE — CASE MANAGEMENT/SOCIAL WORK
Case Management Discharge Note      Final Note: DC home.         Selected Continued Care - Discharged on 11/23/2021 Admission date: 11/19/2021 - Discharge disposition: Home or Self Care    Destination    No services have been selected for the patient.              Durable Medical Equipment Coordination complete.    Service Provider Selected Services Address Phone Fax Patient Preferred    [facility]  Durable Medical Equipment [address] [place] [id number] [phone] --          Dialysis/Infusion    No services have been selected for the patient.              Home Medical Care    No services have been selected for the patient.              Therapy    No services have been selected for the patient.              Community Resources    No services have been selected for the patient.              Community & DME    No services have been selected for the patient.                       Final Discharge Disposition Code: 01 - home or self-care

## 2021-11-24 ENCOUNTER — READMISSION MANAGEMENT (OUTPATIENT)
Dept: CALL CENTER | Facility: HOSPITAL | Age: 61
End: 2021-11-24

## 2021-11-24 ENCOUNTER — TRANSITIONAL CARE MANAGEMENT TELEPHONE ENCOUNTER (OUTPATIENT)
Dept: CALL CENTER | Facility: HOSPITAL | Age: 61
End: 2021-11-24

## 2021-11-24 NOTE — OUTREACH NOTE
Call Center TCM Note      Responses   [facility] patient discharged from? [place]   Does the patient have one of the following disease processes/diagnoses(primary or secondary)? Other   TCM attempt successful? No   Unsuccessful attempts Attempt 2          Lavonne Beard RN    11/24/2021, 13:05 EST

## 2021-11-24 NOTE — OUTREACH NOTE
Prep Survey      Responses   [facility] patient discharged from? [place]   Is LACE score < 7 ? No   Emergency Room discharge w/ pulse ox? No   Eligibility [facility]   Date of Admission 11/19/21   Date of Discharge 11/24/21   Discharge diagnosis **C. difficile colitis   Does the patient have one of the following disease processes/diagnoses(primary or secondary)? Other   Does the patient have Home health ordered? No   Is there a DME ordered? No   Prep survey completed? Yes          Yuliana Wu RN

## 2021-11-24 NOTE — OUTREACH NOTE
Call Center TCM Note      Responses   [facility] patient discharged from? [place]   Does the patient have one of the following disease processes/diagnoses(primary or secondary)? Other   TCM attempt successful? No   Unsuccessful attempts Attempt 1          Lavonne Beard RN    11/24/2021, 09:43 EST

## 2021-11-26 ENCOUNTER — TRANSITIONAL CARE MANAGEMENT TELEPHONE ENCOUNTER (OUTPATIENT)
Dept: CALL CENTER | Facility: HOSPITAL | Age: 61
End: 2021-11-26

## 2021-11-26 NOTE — OUTREACH NOTE
Call Center TCM Note      Responses   [facility] patient discharged from? [place]   Does the patient have one of the following disease processes/diagnoses(primary or secondary)? Other   TCM attempt successful? No   Unsuccessful attempts Attempt 3          Izabella Merida LPN    11/26/2021, 12:02 EST

## 2021-12-03 ENCOUNTER — READMISSION MANAGEMENT (OUTPATIENT)
Dept: CALL CENTER | Facility: HOSPITAL | Age: 61
End: 2021-12-03

## 2021-12-03 NOTE — OUTREACH NOTE
Medical Week 2 Survey      Responses   [facility] patient discharged from? [place]   Does the patient have one of the following disease processes/diagnoses(primary or secondary)? Other   Week 2 attempt successful? No   Unsuccessful attempts Attempt 1   Revoke Decline to participate          Angela Buenrostro RN

## 2022-03-14 ENCOUNTER — HOSPITAL ENCOUNTER (EMERGENCY)
Facility: HOSPITAL | Age: 62
Discharge: HOME | End: 2022-03-14
Attending: EMERGENCY MEDICINE | Admitting: EMERGENCY MEDICINE

## 2022-03-14 VITALS
RESPIRATION RATE: 16 BRPM | BODY MASS INDEX: 30.27 KG/M2 | SYSTOLIC BLOOD PRESSURE: 158 MMHG | OXYGEN SATURATION: 95 % | TEMPERATURE: 97.7 F | HEART RATE: 84 BPM | DIASTOLIC BLOOD PRESSURE: 94 MMHG | HEIGHT: 74 IN

## 2022-03-14 DIAGNOSIS — K02.9 DENTAL CARIES: Primary | ICD-10-CM

## 2022-03-14 DIAGNOSIS — K04.7 DENTAL INFECTION: ICD-10-CM

## 2022-03-14 PROCEDURE — 99283 EMERGENCY DEPT VISIT LOW MDM: CPT

## 2022-03-14 RX ORDER — LIDOCAINE HYDROCHLORIDE 20 MG/ML
10 SOLUTION OROPHARYNGEAL ONCE
Status: COMPLETED | OUTPATIENT
Start: 2022-03-14 | End: 2022-03-14

## 2022-03-14 RX ORDER — AMOXICILLIN 875 MG/1
875 TABLET, COATED ORAL 2 TIMES DAILY
Qty: 20 TABLET | Refills: 0 | Status: SHIPPED | OUTPATIENT
Start: 2022-03-14

## 2022-03-14 RX ORDER — AMOXICILLIN 875 MG/1
875 TABLET, COATED ORAL ONCE
Status: COMPLETED | OUTPATIENT
Start: 2022-03-14 | End: 2022-03-14

## 2022-03-14 RX ORDER — LIDOCAINE HYDROCHLORIDE 20 MG/ML
10 SOLUTION OROPHARYNGEAL AS NEEDED
Qty: 20 ML | Refills: 0 | Status: SHIPPED | OUTPATIENT
Start: 2022-03-14

## 2022-03-14 RX ORDER — LIDOCAINE HYDROCHLORIDE 20 MG/ML
10 SOLUTION OROPHARYNGEAL AS NEEDED
Qty: 20 ML | Refills: 0 | Status: SHIPPED | OUTPATIENT
Start: 2022-03-14 | End: 2022-03-14 | Stop reason: SDUPTHER

## 2022-03-14 RX ADMIN — AMOXICILLIN 875 MG: 875 TABLET, FILM COATED ORAL at 01:48

## 2022-03-14 RX ADMIN — LIDOCAINE HYDROCHLORIDE 10 ML: 20 SOLUTION ORAL; TOPICAL at 01:48

## 2022-03-14 NOTE — ED PROVIDER NOTES
EMERGENCY DEPARTMENT ENCOUNTER    Room Number:  20/20  Date of encounter:  3/14/2022  PCP: Provider, No Known  Historian: Patient      HPI:  Chief Complaint: Dental pain  A complete HPI/ROS/PMH/PSH/SH/FH are unobtainable due to: Nothing    Context: Kwame Andres is a 61 y.o. male who presents to the ED c/o dental pain has been ongoing for the past few weeks but worse over the past 3 days.  Patient states until a few days ago he was able to control the pain with ibuprofen.  Patient informs me the pain originates in the vicinity of first lower molar on the left.  It radiates throughout the left side of his face.  He denies fever, facial swelling, nausea, vomiting.  Pain is described as an 8 out of 10 on the pain scale.  Patient states he is unable to sleep secondary to the pain.  He is here for further evaluation.    His chart he has a past medical history of type 2 diabetes, PTSD, opioid use disorder, polysubstance abuse, methadone dependence, COVID-19.      PAST MEDICAL HISTORY  Active Ambulatory Problems     Diagnosis Date Noted   • Chest pain at rest 08/24/2016   • Chronic pain 08/24/2016   • Type 2 diabetes mellitus, without long-term current use of insulin (HCC) 08/24/2016   • Hypertension 08/24/2016   • DDD (degenerative disc disease), lumbar 08/24/2016   • Pulmonary nodule, needs follow up PET scan as outpatient 08/26/2016   • Drug-seeking behavior 08/26/2016   • Depression with suicidal ideation 09/20/2016   • History of methicillin resistant staphylococcus aureus (MRSA) 12/19/2019   • PTSD (post-traumatic stress disorder) 12/19/2019   • Opioid use disorder, moderate, in sustained remission (HCC) 12/19/2019   • Iron deficiency anemia 12/20/2019   • Cellulitis of left hand 12/20/2019   • Acute colitis 11/19/2021   • Hematochezia 11/19/2021   • Anticoagulated 11/19/2021   • Polysubstance abuse (HCC) 11/19/2021   • Methadone dependence ([facility]) 11/19/2021   • History of COVID-19 11/19/2021   • C. difficile  colitis 11/20/2021     Resolved Ambulatory Problems     Diagnosis Date Noted   • No Resolved Ambulatory Problems     Past Medical History:   Diagnosis Date   • Arthritis    • Diabetes mellitus (HCC)    • Herniated thoracic disc without myelopathy    • Narcotic dependence (HCC)    • Pneumonia    • Torn Achilles tendon          PAST SURGICAL HISTORY  Past Surgical History:   Procedure Laterality Date   • HIP SURGERY Right          FAMILY HISTORY  Family History   Problem Relation Age of Onset   • Cancer Mother    • Hypertension Mother          SOCIAL HISTORY  Social History     Socioeconomic History   • Marital status: Single   Tobacco Use   • Smoking status: Never Smoker   Substance and Sexual Activity   • Alcohol use: No   • Drug use: No   • Sexual activity: Defer         ALLERGIES  Shrimp        REVIEW OF SYSTEMS  Review of Systems   Constitutional: Negative for chills and fever.   HENT: Positive for dental problem. Negative for facial swelling.    Respiratory: Negative.    Cardiovascular: Negative.    Gastrointestinal: Negative.    Genitourinary: Negative.    Musculoskeletal: Negative.    Skin: Negative.    Neurological: Negative.    Psychiatric/Behavioral: Negative.         All systems reviewed and negative except for those discussed in HPI.       PHYSICAL EXAM    I have reviewed the triage vital signs and nursing notes.    ED Triage Vitals [03/14/22 0018]   Temp Heart Rate Resp BP SpO2   97.7 °F (36.5 °C) 84 16 158/94 95 %      Temp src Heart Rate Source Patient Position BP Location FiO2 (%)   Temporal Monitor -- -- --       Physical Exam  GENERAL: Well-nourished, nontoxic  HENT: Gum erythema tooth #30.  No fluctuance, no significant gum or facial swelling.,  Dental caries.    EYES: no scleral icterus  CV: regular rhythm, regular rate normal S1-S2  RESPIRATORY: normal effort  ABDOMEN: soft  MUSCULOSKELETAL: no deformity  NEURO: alert, moves all extremities, follows commands  SKIN: warm, dry        LAB  RESULTS  No results found for this or any previous visit (from the past 24 hour(s)).    Ordered the above labs and independently reviewed the results.        RADIOLOGY  No Radiology Exams Resulted Within Past 24 Hours    I ordered the above noted radiological studies. Reviewed by me and discussed with radiologist.  See dictation for official radiology interpretation.      PROCEDURES    Procedures      MEDICATIONS GIVEN IN ER    Medications   Lidocaine Viscous HCl (XYLOCAINE) 2 % solution 10 mL (10 mL Mouth/Throat Given 3/14/22 0148)   amoxicillin (AMOXIL) tablet 875 mg (875 mg Oral Given 3/14/22 0148)         PROGRESS, DATA ANALYSIS, CONSULTS, AND MEDICAL DECISION MAKING    All labs have been independently reviewed by me.  All radiology studies have been reviewed by me and discussed with radiologist dictating the report.   EKG's independently viewed and interpreted by me.  Discussion below represents my analysis of pertinent findings related to patient's condition, differential diagnosis, treatment plan and final disposition.    DDx includes but is not limited to: Dental caries, fracture, apical abscess.  There is certainly not a drainable abscess.  Suspect early apical abscess.  Will treat with amoxicillin, lidocaine balls, NSAID and have the patient follow-up with a dentist.  Return the emergency department with any facial swelling or worsening of his symptoms.           PPE: The patient wore a surgical mask throughout the entire patient encounter. I wore an N95.    AS OF 06:52 EDT VITALS:    BP - 158/94  HR - 84  TEMP - 97.7 °F (36.5 °C) (Temporal)  O2 SATS - 95%        DIAGNOSIS  Final diagnoses:   Dental caries   Dental infection         DISPOSITION  DISCHARGE    Patient discharged in stable condition.    Reviewed implications of results, diagnosis, meds, responsibility to follow up, warning signs and symptoms of possible worsening, potential complications and reasons to return to ER.    Patient/Family voiced  understanding of above instructions.    Discussed plan for discharge, as there is no emergent indication for admission. Patient referred to primary care provider for BP management due to today's BP. Pt/family is agreeable and understands need for follow up and repeat testing.  Pt is aware that discharge does not mean that nothing is wrong but it indicates no emergency is present that requires admission and they must continue care with follow-up as given below or physician of their choice.     FOLLOW-UP  [facility]  [address] [facility] [address]  [phone]  Schedule an appointment as soon as possible for a visit   For further evaluation and treatment    [facility]ENTAL CARE  [address] [facility] [address]  [phone]  Schedule an appointment as soon as possible for a visit   For further evaluation and treatment         Medication List      New Prescriptions    amoxicillin 875 MG tablet  Commonly known as: AMOXIL  Take 1 tablet by mouth 2 (Two) Times a Day.     Lidocaine Viscous HCl 2 % solution  Commonly known as: XYLOCAINE  Take 10 mL by mouth As Needed for Mild Pain  or Moderate Pain .           Where to Get Your Medications      You can get these medications from any pharmacy    Bring a paper prescription for each of these medications  · amoxicillin 875 MG tablet  · Lidocaine Viscous HCl 2 % solution                Juan Pablo Judge III, PA  03/14/22 0651       Juan Pablo Judge III, PA  03/14/22 0652

## 2022-03-14 NOTE — ED NOTES
"Pt to triage from home via [employer] [id number] with c/o dental pain - initially called for soa, because he said the \"pain was making it hard to breathe.\"  Pt has pain to right upper teeth, pt with poor dentition and poor dental hygiene per ems.  Pt wearing mask in triage. Triage personnel wore appropriate PPE    "

## 2022-03-14 NOTE — DISCHARGE INSTRUCTIONS
Return to the ER with any further concerns, should you experience any gum or facial swelling, or should you develop a fever.     Recommend following up one of the dentist provided above or your personal dentist for definitive care.

## 2022-03-14 NOTE — NURSING NOTE
Pt d/c'd, then stated that he arrived w EMS and will need a ride home. Stated that his only family, his mother, is [address] and doesn't drive, and he has no way back to [facility] where he lives. Pt asked RN for a cab voucher, referral to [facility] ems for transpo pending.

## 2022-03-14 NOTE — ED NOTES
Patient was wearing a face mask throughout our encounter.  This RN wore appropriate PPE throughout the encounter.  Hand hygiene was performed before and after patient encounter.

## 2022-05-26 ENCOUNTER — HOSPITAL ENCOUNTER (EMERGENCY)
Facility: HOSPITAL | Age: 62
Discharge: HOME | End: 2022-05-26
Attending: EMERGENCY MEDICINE | Admitting: EMERGENCY MEDICINE

## 2022-05-26 VITALS
RESPIRATION RATE: 16 BRPM | SYSTOLIC BLOOD PRESSURE: 118 MMHG | TEMPERATURE: 97.6 F | HEART RATE: 105 BPM | DIASTOLIC BLOOD PRESSURE: 62 MMHG | OXYGEN SATURATION: 98 %
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
## 2022-05-26 DIAGNOSIS — S51.812A FOREARM LACERATION, LEFT, INITIAL ENCOUNTER: Primary | ICD-10-CM

## 2022-05-26 PROCEDURE — 99284 EMERGENCY DEPT VISIT MOD MDM: CPT

## 2022-05-26 RX ORDER — OXYCODONE HYDROCHLORIDE AND ACETAMINOPHEN 5; 325 MG/1; MG/1
1 TABLET ORAL ONCE
Status: COMPLETED | OUTPATIENT
Start: 2022-05-26 | End: 2022-05-26

## 2022-05-26 RX ORDER — GINSENG 100 MG
1 CAPSULE ORAL 2 TIMES DAILY
Qty: 14 G | Refills: 0 | Status: SHIPPED | OUTPATIENT
Start: 2022-05-26

## 2022-05-26 RX ORDER — LIDOCAINE HYDROCHLORIDE AND EPINEPHRINE 10; 10 MG/ML; UG/ML
10 INJECTION, SOLUTION INFILTRATION; PERINEURAL ONCE
Status: COMPLETED | OUTPATIENT
Start: 2022-05-26 | End: 2022-05-26

## 2022-05-26 RX ADMIN — OXYCODONE AND ACETAMINOPHEN 1 TABLET: 5; 325 TABLET ORAL at 16:31

## 2022-05-26 RX ADMIN — LIDOCAINE HYDROCHLORIDE,EPINEPHRINE BITARTRATE 10 ML: 10; .01 INJECTION, SOLUTION INFILTRATION; PERINEURAL at 16:31

## 2022-05-26 NOTE — ED PROVIDER NOTES
Laceration Repair    Date/Time: 5/26/2022 5:43 PM  Performed by: Aly Young PA  Authorized by: Silver Anthony MD     Consent:     Consent obtained:  Verbal    Consent given by:  Patient  Anesthesia:     Anesthesia method:  Local infiltration    Local anesthetic:  Lidocaine 1% WITH epi  Laceration details:     Location:  Shoulder/arm    Shoulder/arm location:  L lower arm    Length (cm):  6.1  Pre-procedure details:     Preparation:  Patient was prepped and draped in usual sterile fashion  Exploration:     Hemostasis achieved with:  Epinephrine    Wound exploration: wound explored through full range of motion and entire depth of wound visualized      Wound extent: no foreign bodies/material noted, no nerve damage noted and no tendon damage noted    Treatment:     Area cleansed with:  Shpraveena-Clens    Amount of cleaning:  Extensive    Irrigation solution:  Sterile saline  Skin repair:     Repair method:  Sutures    Suture size:  4-0    Suture material:  Nylon    Suture technique:  Horizontal mattress and running    Number of sutures:  11  Approximation:     Approximation:  Close  Repair type:     Repair type:  Simple  Post-procedure details:     Dressing:  Antibiotic ointment and sterile dressing    Procedure completion:  Tolerated well, no immediate complications           Aly Young PA  05/26/22 1900

## 2022-05-26 NOTE — ED PROVIDER NOTES
EMERGENCY DEPARTMENT ENCOUNTER  I wore full protective equipment throughout this patient encounter including a N95 mask, eye shield, gown and gloves. Hand hygiene was performed before donning protective equipment and after removal when leaving the room.    Room Number:  12/12  Date of encounter:  5/26/2022  PCP: Provider, No Known    HPI:  Context: Kwame Andres is a 61 y.o. male who presents to the ED c/o chief complaint of laceration of his left forearm.  Patient reports that he was walking down the stairs when he tripped, caught himself on metal poles but received a laceration on his left forearm.  Patient denies any other injury, no head injury, no loss consciousness, no neck or back pain.  Patient reports that there was significant amount of bleeding but bleeding is since stopped.  Patient denies any weakness or numbness.  Patient denies any concern for fracture.  Patient reports tetanus is up-to-date.    MEDICAL HISTORY REVIEW  Reviewed in EPIC    PAST MEDICAL HISTORY  Active Ambulatory Problems     Diagnosis Date Noted   • Chest pain at rest 08/24/2016   • Chronic pain 08/24/2016   • Type 2 diabetes mellitus, without long-term current use of insulin (HCC) 08/24/2016   • Hypertension 08/24/2016   • DDD (degenerative disc disease), lumbar 08/24/2016   • Pulmonary nodule, needs follow up PET scan as outpatient 08/26/2016   • Drug-seeking behavior 08/26/2016   • Depression with suicidal ideation 09/20/2016   • History of methicillin resistant staphylococcus aureus (MRSA) 12/19/2019   • PTSD (post-traumatic stress disorder) 12/19/2019   • Opioid use disorder, moderate, in sustained remission ([facility]) 12/19/2019   • Iron deficiency anemia 12/20/2019   • Cellulitis of left hand 12/20/2019   • Acute colitis 11/19/2021   • Hematochezia 11/19/2021   • Anticoagulated 11/19/2021   • Polysubstance abuse ([facility]) 11/19/2021   • Methadone dependence ([facility]) 11/19/2021   • History of COVID-19 11/19/2021   • C. difficile colitis  11/20/2021     Resolved Ambulatory Problems     Diagnosis Date Noted   • No Resolved Ambulatory Problems     Past Medical History:   Diagnosis Date   • Arthritis    • Diabetes mellitus (HCC)    • Herniated thoracic disc without myelopathy    • Narcotic dependence (HCC)    • Pneumonia    • Torn Achilles tendon        PAST SURGICAL HISTORY  Past Surgical History:   Procedure Laterality Date   • HIP SURGERY Right        FAMILY HISTORY  Family History   Problem Relation Age of Onset   • Cancer Mother    • Hypertension Mother        SOCIAL HISTORY  Social History     Socioeconomic History   • Marital status: Single   Tobacco Use   • Smoking status: Never Smoker   Substance and Sexual Activity   • Alcohol use: No   • Drug use: No   • Sexual activity: Defer       ALLERGIES  Shrimp    The patient's allergies have been reviewed    REVIEW OF SYSTEMS  All systems reviewed and negative except for those discussed in HPI.     PHYSICAL EXAM  I have reviewed the triage vital signs and nursing notes.  ED Triage Vitals [05/26/22 1537]   Temp Heart Rate Resp BP SpO2   -- 105 20 115/71 98 %      Temp src Heart Rate Source Patient Position BP Location FiO2 (%)   -- -- -- -- --       General: No acute distress.  HENT: NCAT, PERRL, Nares patent.  Eyes: no scleral icterus.  Neck: trachea midline, no ROM limitations.  CV: regular rhythm, regular rate.  Respiratory: normal effort, CTAB.  Abdomen: soft, nondistended, NTTP, no rebound tenderness, no guarding or rigidity.  Musculoskeletal: no deformity.  Left forearm: Patient has multiple linear excoriations down the dorsal surface of his left forearm with a linear laceration on the distal dorsal forearm overlying the distal radius.  Patient has no bony tenderness in the forearm, wrist is nontender, no snuffbox tenderness, wrist has full range of motion, patient is able make a fist and open his hand normally. Positive thumbs up/okay sign/fingers abduct/fingers abduct, sensation intact light  touch radial/medial/ulnar nerve distribution, 2+ radial and ulnar pulses, brisk cap refill all digits.  Neuro: alert, moves all extremities, follows commands.  Skin: warm, dry.    LAB RESULTS  No results found for this or any previous visit (from the past 24 hour(s)).    I ordered the above labs and reviewed the results.    RADIOLOGY  No Radiology Exams Resulted Within Past 24 Hours    I ordered the above noted radiological studies. I reviewed the images and results. I agree with the radiologist interpretation.    PROCEDURES  Procedures    MEDICATIONS GIVEN IN ER  Medications   oxyCODONE-acetaminophen (PERCOCET) 5-325 MG per tablet 1 tablet (1 tablet Oral Given 5/26/22 1631)   lidocaine 1% - EPINEPHrine 1:448319 (XYLOCAINE W/EPI) 1 %-1:870675 injection 10 mL (10 mL Injection Given by Other 5/26/22 1631)       PROGRESS, DATA ANALYSIS, CONSULTS, AND MEDICAL DECISION MAKING  A complete history and physical exam have been performed.  All available laboratory and imaging results have been reviewed by myself prior to disposition.    MDM  After the initial H&P, I discussed pertinent information from history and physical exam with patient/family.  Discussed differential diagnosis.  Discussed plan for ED evaluation/workup/treatment.  All questions answered.  Patient/family is agreeable with plan.  ED Course as of 05/26/22 1811   Thu May 26, 2022   1606 Patient presents with left forearm laceration.  Patient denies any bony tenderness, offered x-ray imaging, patient declines.  Tetanus is up-to-date.  Treating pain, plan for wound irrigation cleaning and repair. [JG]   1806 Serration repaired by NORMA Garner without complication.  Discussed wound care, discussed need for follow-up with primary care for wound check as well as for suture removal.  Given extensive discussion return precautions, discharging. [JG]   1804 The patient was reexamined.  They have had symptomatic improvement during their ED stay.  I discussed today's  findings with the patient, explaining the pertinent positives and negatives from today's visit, and the plan of care.  Discussed plan for discharge as there is no emergent indication for admission.  Discussed limitation of the ED work-up and that this is to rule out life-threatening emergencies but that they could require further testing as determined by their primary care and or any referred specialist patient is agreeable and understands need for follow-up and repeat exam/testing.  Patient is aware that discharge does not mean there is nothing wrong, indicates no emergency is present, and that they must continue their care with their primary care physician and/or any referred specialist.  They were given appropriate follow-up with their primary care physician and/or specialist.  I had an extensive discussion on the expected clinical course and return precautions.  Patient understands to return to the emergency department for continuation, worsening, or new symptoms.  I answered any of the patient's questions. Patient was discharged home in a stable condition.     [JG]      ED Course User Index  [JG] Silver Anthony MD       AS OF 18:11 EDT VITALS:    BP - 118/62  HR - 105  TEMP - 97.6 °F (36.4 °C)  O2 SATS - 98%    DIAGNOSIS  Final diagnoses:   Forearm laceration, left, initial encounter   Fall, initial encounter         DISPOSITION  DISCHARGE    Patient discharged in stable condition.    Reviewed implications of results, diagnosis, meds, responsibility to follow up, warning signs and symptoms of possible worsening, potential complications and reasons to return to ER.    Patient/Family voiced understanding of above instructions.    Discussed plan for discharge, as there is no emergent indication for admission. Patient referred to primary care provider for BP management due to today's BP. Pt/family is agreeable and understands need for follow up and repeat testing.  Pt is aware that discharge does not mean that  nothing is wrong but it indicates no emergency is present that requires admission and they must continue care with follow-up as given below or physician of their choice.     FOLLOW-UP  Your PCP    Schedule an appointment as soon as possible for a visit in 2 days  even if well, For wound re-check    Your PCP    Schedule an appointment as soon as possible for a visit in 10 days  even if well, For suture/staple removal    PATIENT CONNECTION - [place] [address]  [phone]    if you are unable to follow up with your PCP         Medication List      New Prescriptions    bacitracin 500 UNIT/GM ointment  Apply 1 application topically to the appropriate area as directed 2 (Two) Times a Day.           Where to Get Your Medications      You can get these medications from any pharmacy    Bring a paper prescription for each of these medications  · bacitracin 500 UNIT/GM ointment          Silver Anthony MD  05/26/22 8095

## 2022-05-26 NOTE — ED NOTES
Pt sitting in wc in wr waiting cab ride home, pt appears increasingly diaphoretic, hr noted 140, pt reports feeling weak and tired, pt reports he does not want to be seen again but agrees to stay if he starts feeling worse, confirms he has a caregiver at home that will monitor him

## 2022-05-26 NOTE — ED TRIAGE NOTES
.Pt masked on arrival, staff masked    Pt from home via ems, called after he fell and hit left wrist on the corner of a tv stand, laceration to left wrist, ems reports large amount of blood loss on scene
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
## 2024-03-04 ENCOUNTER — APPOINTMENT (OUTPATIENT)
Dept: GENERAL RADIOLOGY | Facility: HOSPITAL | Age: 64
End: 2024-03-04

## 2024-03-04 ENCOUNTER — HOSPITAL ENCOUNTER (OUTPATIENT)
Facility: HOSPITAL | Age: 64
Setting detail: OBSERVATION
Discharge: HOME | End: 2024-03-05
Attending: EMERGENCY MEDICINE | Admitting: EMERGENCY MEDICINE
Payer: MEDICAID

## 2024-03-04 ENCOUNTER — APPOINTMENT (OUTPATIENT)
Dept: CARDIOLOGY | Facility: HOSPITAL | Age: 64
End: 2024-03-04

## 2024-03-04 ENCOUNTER — APPOINTMENT (OUTPATIENT)
Dept: CT IMAGING | Facility: HOSPITAL | Age: 64
End: 2024-03-04

## 2024-03-04 DIAGNOSIS — J44.9 CHRONIC OBSTRUCTIVE PULMONARY DISEASE, UNSPECIFIED COPD TYPE: ICD-10-CM

## 2024-03-04 DIAGNOSIS — L03.115 CELLULITIS OF RIGHT LEG: Primary | ICD-10-CM

## 2024-03-04 DIAGNOSIS — R60.0 LEG EDEMA, RIGHT: ICD-10-CM

## 2024-03-04 PROBLEM — L03.116 LEFT LEG CELLULITIS: Status: ACTIVE | Noted: 2024-03-04

## 2024-03-04 PROBLEM — L03.116 LEFT LEG CELLULITIS: Status: RESOLVED | Noted: 2024-03-04 | Resolved: 2024-03-04

## 2024-03-04 LAB
ALBUMIN SERPL-MCNC: 4.2 G/DL (ref 3.5–5.2)
ALBUMIN/GLOB SERPL: 1.3 G/DL
ALP SERPL-CCNC: 93 U/L (ref 39–117)
ALT SERPL W P-5'-P-CCNC: 20 U/L (ref 1–41)
ANION GAP SERPL CALCULATED.3IONS-SCNC: 8 MMOL/L (ref 5–15)
AST SERPL-CCNC: 18 U/L (ref 1–40)
BASOPHILS # BLD AUTO: 0.03 10*3/MM3 (ref 0–0.2)
BASOPHILS NFR BLD AUTO: 0.5 % (ref 0–1.5)
BH CV LOW VAS LEFT POPLITEAL SPONT: 1
BH CV LOWER VASCULAR LEFT COMMON FEMORAL AUGMENT: NORMAL
BH CV LOWER VASCULAR LEFT COMMON FEMORAL COMPETENT: NORMAL
BH CV LOWER VASCULAR LEFT COMMON FEMORAL COMPRESS: NORMAL
BH CV LOWER VASCULAR LEFT COMMON FEMORAL PHASIC: NORMAL
BH CV LOWER VASCULAR LEFT COMMON FEMORAL SPONT: NORMAL
BH CV LOWER VASCULAR LEFT DISTAL FEMORAL COMPRESS: NORMAL
BH CV LOWER VASCULAR LEFT GASTRONEMIUS COMPRESS: NORMAL
BH CV LOWER VASCULAR LEFT GREATER SAPH AK COMPRESS: NORMAL
BH CV LOWER VASCULAR LEFT GREATER SAPH BK COMPRESS: NORMAL
BH CV LOWER VASCULAR LEFT LESSER SAPH COMPRESS: NORMAL
BH CV LOWER VASCULAR LEFT MID FEMORAL AUGMENT: NORMAL
BH CV LOWER VASCULAR LEFT MID FEMORAL COMPETENT: NORMAL
BH CV LOWER VASCULAR LEFT MID FEMORAL COMPRESS: NORMAL
BH CV LOWER VASCULAR LEFT MID FEMORAL PHASIC: NORMAL
BH CV LOWER VASCULAR LEFT MID FEMORAL SPONT: NORMAL
BH CV LOWER VASCULAR LEFT PERONEAL COMPRESS: NORMAL
BH CV LOWER VASCULAR LEFT POPLITEAL AUGMENT: NORMAL
BH CV LOWER VASCULAR LEFT POPLITEAL COMPETENT: NORMAL
BH CV LOWER VASCULAR LEFT POPLITEAL COMPRESS: NORMAL
BH CV LOWER VASCULAR LEFT POPLITEAL PHASIC: NORMAL
BH CV LOWER VASCULAR LEFT POPLITEAL SPONT: NORMAL
BH CV LOWER VASCULAR LEFT POSTERIOR TIBIAL COMPRESS: NORMAL
BH CV LOWER VASCULAR LEFT PROFUNDA FEMORAL COMPRESS: NORMAL
BH CV LOWER VASCULAR LEFT PROXIMAL FEMORAL COMPRESS: NORMAL
BH CV LOWER VASCULAR LEFT SAPHENOFEMORAL JUNCTION COMPRESS: NORMAL
BH CV LOWER VASCULAR RIGHT COMMON FEMORAL AUGMENT: NORMAL
BH CV LOWER VASCULAR RIGHT COMMON FEMORAL COMPETENT: NORMAL
BH CV LOWER VASCULAR RIGHT COMMON FEMORAL COMPRESS: NORMAL
BH CV LOWER VASCULAR RIGHT COMMON FEMORAL PHASIC: NORMAL
BH CV LOWER VASCULAR RIGHT COMMON FEMORAL SPONT: NORMAL
BH CV LOWER VASCULAR RIGHT DISTAL FEMORAL COMPRESS: NORMAL
BH CV LOWER VASCULAR RIGHT GASTRONEMIUS COMPRESS: NORMAL
BH CV LOWER VASCULAR RIGHT GREATER SAPH AK COMPRESS: NORMAL
BH CV LOWER VASCULAR RIGHT GREATER SAPH BK COMPRESS: NORMAL
BH CV LOWER VASCULAR RIGHT LESSER SAPH COMPRESS: NORMAL
BH CV LOWER VASCULAR RIGHT MID FEMORAL AUGMENT: NORMAL
BH CV LOWER VASCULAR RIGHT MID FEMORAL COMPETENT: NORMAL
BH CV LOWER VASCULAR RIGHT MID FEMORAL COMPRESS: NORMAL
BH CV LOWER VASCULAR RIGHT MID FEMORAL PHASIC: NORMAL
BH CV LOWER VASCULAR RIGHT MID FEMORAL SPONT: NORMAL
BH CV LOWER VASCULAR RIGHT PERONEAL COMPRESS: NORMAL
BH CV LOWER VASCULAR RIGHT POPLITEAL AUGMENT: NORMAL
BH CV LOWER VASCULAR RIGHT POPLITEAL COMPETENT: NORMAL
BH CV LOWER VASCULAR RIGHT POPLITEAL COMPRESS: NORMAL
BH CV LOWER VASCULAR RIGHT POPLITEAL PHASIC: NORMAL
BH CV LOWER VASCULAR RIGHT POPLITEAL SPONT: NORMAL
BH CV LOWER VASCULAR RIGHT POSTERIOR TIBIAL COMPRESS: NORMAL
BH CV LOWER VASCULAR RIGHT PROFUNDA FEMORAL COMPRESS: NORMAL
BH CV LOWER VASCULAR RIGHT PROXIMAL FEMORAL COMPRESS: NORMAL
BH CV LOWER VASCULAR RIGHT SAPHENOFEMORAL JUNCTION COMPRESS: NORMAL
BH CV VAS PRELIMINARY FINDINGS SCRIPTING: 1
BILIRUB SERPL-MCNC: 0.4 MG/DL (ref 0–1.2)
BUN SERPL-MCNC: 11 MG/DL (ref 8–23)
BUN/CREAT SERPL: 13.4 (ref 7–25)
CALCIUM SPEC-SCNC: 8.9 MG/DL (ref 8.6–10.5)
CHLORIDE SERPL-SCNC: 101 MMOL/L (ref 98–107)
CO2 SERPL-SCNC: 29 MMOL/L (ref 22–29)
CREAT SERPL-MCNC: 0.82 MG/DL (ref 0.76–1.27)
D-LACTATE SERPL-SCNC: 1.5 MMOL/L (ref 0.5–2)
DEPRECATED RDW RBC AUTO: 42.4 FL (ref 37–54)
EGFRCR SERPLBLD CKD-EPI 2021: 98.7 ML/MIN/1.73
EOSINOPHIL # BLD AUTO: 0.17 10*3/MM3 (ref 0–0.4)
EOSINOPHIL NFR BLD AUTO: 2.8 % (ref 0.3–6.2)
ERYTHROCYTE [DISTWIDTH] IN BLOOD BY AUTOMATED COUNT: 13.4 % (ref 12.3–15.4)
GLOBULIN UR ELPH-MCNC: 3.3 GM/DL
GLUCOSE SERPL-MCNC: 209 MG/DL (ref 65–99)
HBA1C MFR BLD: 8.4 % (ref 4.8–5.6)
HCT VFR BLD AUTO: 38.2 % (ref 37.5–51)
HGB BLD-MCNC: 12.9 G/DL (ref 13–17.7)
IMM GRANULOCYTES # BLD AUTO: 0.03 10*3/MM3 (ref 0–0.05)
IMM GRANULOCYTES NFR BLD AUTO: 0.5 % (ref 0–0.5)
LYMPHOCYTES # BLD AUTO: 1.07 10*3/MM3 (ref 0.7–3.1)
LYMPHOCYTES NFR BLD AUTO: 17.6 % (ref 19.6–45.3)
MCH RBC QN AUTO: 30.1 PG (ref 26.6–33)
MCHC RBC AUTO-ENTMCNC: 33.8 G/DL (ref 31.5–35.7)
MCV RBC AUTO: 89 FL (ref 79–97)
MONOCYTES # BLD AUTO: 0.54 10*3/MM3 (ref 0.1–0.9)
MONOCYTES NFR BLD AUTO: 8.9 % (ref 5–12)
NEUTROPHILS NFR BLD AUTO: 4.24 10*3/MM3 (ref 1.7–7)
NEUTROPHILS NFR BLD AUTO: 69.7 % (ref 42.7–76)
NRBC BLD AUTO-RTO: 0 /100 WBC (ref 0–0.2)
NT-PROBNP SERPL-MCNC: 38.7 PG/ML (ref 0–900)
PLATELET # BLD AUTO: 213 10*3/MM3 (ref 140–450)
PMV BLD AUTO: 12.6 FL (ref 6–12)
POTASSIUM SERPL-SCNC: 4 MMOL/L (ref 3.5–5.2)
PROT SERPL-MCNC: 7.5 G/DL (ref 6–8.5)
QT INTERVAL: 402 MS
QTC INTERVAL: 437 MS
RBC # BLD AUTO: 4.29 10*6/MM3 (ref 4.14–5.8)
SODIUM SERPL-SCNC: 138 MMOL/L (ref 136–145)
TROPONIN T SERPL HS-MCNC: 13 NG/L
WBC NRBC COR # BLD AUTO: 6.08 10*3/MM3 (ref 3.4–10.8)

## 2024-03-04 PROCEDURE — 80053 COMPREHEN METABOLIC PANEL: CPT | Performed by: EMERGENCY MEDICINE

## 2024-03-04 PROCEDURE — G0378 HOSPITAL OBSERVATION PER HR: HCPCS

## 2024-03-04 PROCEDURE — 93010 ELECTROCARDIOGRAM REPORT: CPT | Performed by: STUDENT IN AN ORGANIZED HEALTH CARE EDUCATION/TRAINING PROGRAM

## 2024-03-04 PROCEDURE — 93970 EXTREMITY STUDY: CPT

## 2024-03-04 PROCEDURE — 25010000002 CEFTRIAXONE PER 250 MG: Performed by: EMERGENCY MEDICINE

## 2024-03-04 PROCEDURE — 63710000001 INSULIN GLARGINE PER 5 UNITS

## 2024-03-04 PROCEDURE — 84484 ASSAY OF TROPONIN QUANT: CPT | Performed by: EMERGENCY MEDICINE

## 2024-03-04 PROCEDURE — 74176 CT ABD & PELVIS W/O CONTRAST: CPT

## 2024-03-04 PROCEDURE — 83880 ASSAY OF NATRIURETIC PEPTIDE: CPT | Performed by: EMERGENCY MEDICINE

## 2024-03-04 PROCEDURE — 36415 COLL VENOUS BLD VENIPUNCTURE: CPT | Performed by: EMERGENCY MEDICINE

## 2024-03-04 PROCEDURE — 83605 ASSAY OF LACTIC ACID: CPT | Performed by: EMERGENCY MEDICINE

## 2024-03-04 PROCEDURE — 71045 X-RAY EXAM CHEST 1 VIEW: CPT

## 2024-03-04 PROCEDURE — 99285 EMERGENCY DEPT VISIT HI MDM: CPT

## 2024-03-04 PROCEDURE — 96365 THER/PROPH/DIAG IV INF INIT: CPT

## 2024-03-04 PROCEDURE — 83036 HEMOGLOBIN GLYCOSYLATED A1C: CPT

## 2024-03-04 PROCEDURE — 85025 COMPLETE CBC W/AUTO DIFF WBC: CPT | Performed by: EMERGENCY MEDICINE

## 2024-03-04 PROCEDURE — 93005 ELECTROCARDIOGRAM TRACING: CPT | Performed by: EMERGENCY MEDICINE

## 2024-03-04 PROCEDURE — 87040 BLOOD CULTURE FOR BACTERIA: CPT | Performed by: EMERGENCY MEDICINE

## 2024-03-04 RX ORDER — DEXTROSE MONOHYDRATE 25 G/50ML
25 INJECTION, SOLUTION INTRAVENOUS
Status: DISCONTINUED | OUTPATIENT
Start: 2024-03-04 | End: 2024-03-05 | Stop reason: HOSPADM

## 2024-03-04 RX ORDER — SODIUM CHLORIDE 0.9 % (FLUSH) 0.9 %
10 SYRINGE (ML) INJECTION AS NEEDED
Status: DISCONTINUED | OUTPATIENT
Start: 2024-03-04 | End: 2024-03-05 | Stop reason: HOSPADM

## 2024-03-04 RX ORDER — CARVEDILOL 3.12 MG/1
3.12 TABLET ORAL 2 TIMES DAILY WITH MEALS
Status: DISCONTINUED | OUTPATIENT
Start: 2024-03-04 | End: 2024-03-05 | Stop reason: HOSPADM

## 2024-03-04 RX ORDER — NICOTINE POLACRILEX 4 MG
15 LOZENGE BUCCAL
Status: DISCONTINUED | OUTPATIENT
Start: 2024-03-04 | End: 2024-03-05 | Stop reason: HOSPADM

## 2024-03-04 RX ORDER — ONDANSETRON 4 MG/1
4 TABLET, ORALLY DISINTEGRATING ORAL EVERY 6 HOURS PRN
Status: DISCONTINUED | OUTPATIENT
Start: 2024-03-04 | End: 2024-03-05 | Stop reason: HOSPADM

## 2024-03-04 RX ORDER — ONDANSETRON 2 MG/ML
4 INJECTION INTRAMUSCULAR; INTRAVENOUS EVERY 6 HOURS PRN
Status: DISCONTINUED | OUTPATIENT
Start: 2024-03-04 | End: 2024-03-05 | Stop reason: HOSPADM

## 2024-03-04 RX ORDER — POLYETHYLENE GLYCOL 3350 17 G/17G
17 POWDER, FOR SOLUTION ORAL DAILY PRN
Status: DISCONTINUED | OUTPATIENT
Start: 2024-03-04 | End: 2024-03-05 | Stop reason: HOSPADM

## 2024-03-04 RX ORDER — ACETAMINOPHEN 325 MG/1
650 TABLET ORAL EVERY 4 HOURS PRN
Status: DISCONTINUED | OUTPATIENT
Start: 2024-03-04 | End: 2024-03-05 | Stop reason: HOSPADM

## 2024-03-04 RX ORDER — HYDROXYZINE HYDROCHLORIDE 25 MG/1
50 TABLET, FILM COATED ORAL 3 TIMES DAILY PRN
Status: DISCONTINUED | OUTPATIENT
Start: 2024-03-04 | End: 2024-03-04

## 2024-03-04 RX ORDER — SODIUM CHLORIDE 0.9 % (FLUSH) 0.9 %
10 SYRINGE (ML) INJECTION EVERY 12 HOURS SCHEDULED
Status: DISCONTINUED | OUTPATIENT
Start: 2024-03-04 | End: 2024-03-05 | Stop reason: HOSPADM

## 2024-03-04 RX ORDER — BISACODYL 10 MG
10 SUPPOSITORY, RECTAL RECTAL DAILY PRN
Status: DISCONTINUED | OUTPATIENT
Start: 2024-03-04 | End: 2024-03-05 | Stop reason: HOSPADM

## 2024-03-04 RX ORDER — AMOXICILLIN 250 MG
2 CAPSULE ORAL 2 TIMES DAILY
Status: DISCONTINUED | OUTPATIENT
Start: 2024-03-04 | End: 2024-03-05 | Stop reason: HOSPADM

## 2024-03-04 RX ORDER — SODIUM CHLORIDE 9 MG/ML
40 INJECTION, SOLUTION INTRAVENOUS AS NEEDED
Status: DISCONTINUED | OUTPATIENT
Start: 2024-03-04 | End: 2024-03-05 | Stop reason: HOSPADM

## 2024-03-04 RX ORDER — CLONAZEPAM 0.5 MG/1
0.5 TABLET ORAL 2 TIMES DAILY PRN
Status: DISCONTINUED | OUTPATIENT
Start: 2024-03-04 | End: 2024-03-04

## 2024-03-04 RX ORDER — DULOXETIN HYDROCHLORIDE 30 MG/1
30 CAPSULE, DELAYED RELEASE ORAL DAILY
Status: DISCONTINUED | OUTPATIENT
Start: 2024-03-04 | End: 2024-03-04

## 2024-03-04 RX ORDER — INSULIN LISPRO 100 [IU]/ML
2-9 INJECTION, SOLUTION INTRAVENOUS; SUBCUTANEOUS
Status: DISCONTINUED | OUTPATIENT
Start: 2024-03-05 | End: 2024-03-05 | Stop reason: HOSPADM

## 2024-03-04 RX ORDER — IBUPROFEN 600 MG/1
1 TABLET ORAL
Status: DISCONTINUED | OUTPATIENT
Start: 2024-03-04 | End: 2024-03-05 | Stop reason: HOSPADM

## 2024-03-04 RX ORDER — CLONIDINE HYDROCHLORIDE 0.1 MG/1
0.1 TABLET ORAL 3 TIMES DAILY
Status: DISCONTINUED | OUTPATIENT
Start: 2024-03-04 | End: 2024-03-04

## 2024-03-04 RX ORDER — BISACODYL 5 MG/1
5 TABLET, DELAYED RELEASE ORAL DAILY PRN
Status: DISCONTINUED | OUTPATIENT
Start: 2024-03-04 | End: 2024-03-05 | Stop reason: HOSPADM

## 2024-03-04 RX ORDER — FUROSEMIDE 40 MG/1
40 TABLET ORAL DAILY
Status: DISCONTINUED | OUTPATIENT
Start: 2024-03-04 | End: 2024-03-05 | Stop reason: HOSPADM

## 2024-03-04 RX ADMIN — Medication 10 ML: at 22:09

## 2024-03-04 RX ADMIN — CEFTRIAXONE 2000 MG: 2 INJECTION, POWDER, FOR SOLUTION INTRAMUSCULAR; INTRAVENOUS at 18:05

## 2024-03-04 RX ADMIN — CARVEDILOL 3.12 MG: 3.12 TABLET, FILM COATED ORAL at 20:54

## 2024-03-04 RX ADMIN — INSULIN GLARGINE 20 UNITS: 100 INJECTION, SOLUTION SUBCUTANEOUS at 20:54

## 2024-03-04 RX ADMIN — CLONIDINE HYDROCHLORIDE 0.1 MG: 0.1 TABLET ORAL at 20:54

## 2024-03-04 RX ADMIN — FUROSEMIDE 40 MG: 40 TABLET ORAL at 20:54

## 2024-03-04 RX ADMIN — DULOXETINE HYDROCHLORIDE 30 MG: 30 CAPSULE, DELAYED RELEASE ORAL at 21:51

## 2024-03-04 RX ADMIN — ACETAMINOPHEN 325MG 650 MG: 325 TABLET ORAL at 19:55

## 2024-03-04 NOTE — ED PROVIDER NOTES
EMERGENCY DEPARTMENT ENCOUNTER    Room Number:  35/35  PCP: Provider, No Known    HPI:  Chief Complaint: Swelling  A complete HPI/ROS/PMH/PSH/SH/FH are unobtainable due to: None  Context: Kwame Andres is a 63 y.o. male who presents to the ED c/o acute leg swelling.  This began 2 weeks ago where the right side is more affected than the left.  Is been progressively worsening.  He takes no blood thinners.  He denies having any current chest pain or shortness of breath.  He uses oxygen only at night.  No fevers.  He states that he used to be on Lasix and hormone supplementation but has been off of his medicines for about 1 to 2 years after his PCP .        PAST MEDICAL HISTORY  Active Ambulatory Problems     Diagnosis Date Noted    Chest pain at rest 2016    Chronic pain 2016    Type 2 diabetes mellitus, without long-term current use of insulin 2016    Hypertension 2016    DDD (degenerative disc disease), lumbar 2016    Pulmonary nodule, needs follow up PET scan as outpatient 2016    Drug-seeking behavior 2016    Depression with suicidal ideation 2016    History of methicillin resistant staphylococcus aureus (MRSA) 2019    PTSD (post-traumatic stress disorder) 2019    Opioid use disorder, moderate, in sustained remission 2019    Iron deficiency anemia 2019    Cellulitis of left hand 2019    Acute colitis 2021    Hematochezia 2021    Anticoagulated 2021    Polysubstance abuse 2021    Methadone dependence 2021    History of COVID-19 2021    C. difficile colitis 2021     Resolved Ambulatory Problems     Diagnosis Date Noted    No Resolved Ambulatory Problems     Past Medical History:   Diagnosis Date    Arthritis     Diabetes mellitus     Herniated thoracic disc without myelopathy     Narcotic dependence     Pneumonia     Torn Achilles tendon          PAST SURGICAL HISTORY  Past Surgical History:    Procedure Laterality Date    HIP SURGERY Right          FAMILY HISTORY  Family History   Problem Relation Age of Onset    Cancer Mother     Hypertension Mother          SOCIAL HISTORY  Social History     Socioeconomic History    Marital status: Single   Tobacco Use    Smoking status: Never   Substance and Sexual Activity    Alcohol use: No    Drug use: No    Sexual activity: Defer         ALLERGIES  Shrimp        REVIEW OF SYSTEMS  Review of Systems     Included in HPI  All systems reviewed and negative except for those discussed in HPI.       PHYSICAL EXAM  ED Triage Vitals [03/04/24 0817]   Temp Heart Rate Resp BP SpO2   98.4 °F (36.9 °C) 78 18 168/72 96 %      Temp src Heart Rate Source Patient Position BP Location FiO2 (%)   -- -- -- -- --       Physical Exam      GENERAL: no acute distress  HENT: nares patent  EYES: no scleral icterus  CV: regular rhythm, normal rate, 2+ DP pulses bilaterally  RESPIRATORY: normal effort, clear to auscultation bilaterally  ABDOMEN: soft, obese abdomen  : normal external genitalia  MUSCULOSKELETAL: no deformity, 3+ edema to the right leg and thigh and 1+ edema to the left leg  NEURO: alert, moves all extremities, follows commands  PSYCH:  calm, cooperative  SKIN: warm, dry, there is erythema and warmth overlying the right calf, no crepitus    Vital signs and nursing notes reviewed.          LAB RESULTS  Recent Results (from the past 24 hour(s))   ECG 12 Lead Dyspnea    Collection Time: 03/04/24  8:46 AM   Result Value Ref Range    QT Interval 402 ms    QTC Interval 437 ms   Duplex Venous Lower Extremity - BILATERAL    Collection Time: 03/04/24 10:25 AM   Result Value Ref Range    Left Popliteal Spont 1.0     Right Common Femoral Spont Y     Right Common Femoral Competent Y     Right Common Femoral Phasic Y     Right Common Femoral Compress C     Right Common Femoral Augment Y     Right Saphenofemoral Junction Compress C     Right Profunda Femoral Compress C     Right Proximal  Femoral Compress C     Right Mid Femoral Spont Y     Right Mid Femoral Competent Y     Right Mid Femoral Phasic Y     Right Mid Femoral Compress C     Right Mid Femoral Augment Y     Right Distal Femoral Compress C     Right Popliteal Spont Y     Right Popliteal Competent Y     Right Popliteal Phasic Y     Right Popliteal Compress C     Right Popliteal Augment Y     Right Posterior Tibial Compress C     Right Peroneal Compress C     Right Gastronemius Compress C     Right Greater Saph AK Compress C     Right Greater Saph BK Compress C     Right Lesser Saph Compress C     Left Common Femoral Spont Y     Left Common Femoral Competent Y     Left Common Femoral Phasic Y     Left Common Femoral Compress C     Left Common Femoral Augment Y     Left Saphenofemoral Junction Compress C     Left Profunda Femoral Compress C     Left Proximal Femoral Compress C     Left Mid Femoral Spont Y     Left Mid Femoral Competent Y     Left Mid Femoral Phasic Y     Left Mid Femoral Compress C     Left Mid Femoral Augment Y     Left Distal Femoral Compress C     Left Popliteal Spont Y     Left Popliteal Competent N     Left Popliteal Phasic Y     Left Popliteal Compress C     Left Popliteal Augment Y     Left Posterior Tibial Compress C     Left Peroneal Compress C     Left Gastronemius Compress C     Left Greater Saph AK Compress C     Left Greater Saph BK Compress C     Left Lesser Saph Compress C     BH CV VAS PRELIMINARY FINDINGS SCRIPTING 1.0    BNP    Collection Time: 03/04/24 11:01 AM    Specimen: Blood   Result Value Ref Range    proBNP 38.7 0.0 - 900.0 pg/mL   CBC Auto Differential    Collection Time: 03/04/24 11:01 AM    Specimen: Blood   Result Value Ref Range    WBC 6.08 3.40 - 10.80 10*3/mm3    RBC 4.29 4.14 - 5.80 10*6/mm3    Hemoglobin 12.9 (L) 13.0 - 17.7 g/dL    Hematocrit 38.2 37.5 - 51.0 %    MCV 89.0 79.0 - 97.0 fL    MCH 30.1 26.6 - 33.0 pg    MCHC 33.8 31.5 - 35.7 g/dL    RDW 13.4 12.3 - 15.4 %    RDW-SD 42.4 37.0 -  54.0 fl    MPV 12.6 (H) 6.0 - 12.0 fL    Platelets 213 140 - 450 10*3/mm3    Neutrophil % 69.7 42.7 - 76.0 %    Lymphocyte % 17.6 (L) 19.6 - 45.3 %    Monocyte % 8.9 5.0 - 12.0 %    Eosinophil % 2.8 0.3 - 6.2 %    Basophil % 0.5 0.0 - 1.5 %    Immature Grans % 0.5 0.0 - 0.5 %    Neutrophils, Absolute 4.24 1.70 - 7.00 10*3/mm3    Lymphocytes, Absolute 1.07 0.70 - 3.10 10*3/mm3    Monocytes, Absolute 0.54 0.10 - 0.90 10*3/mm3    Eosinophils, Absolute 0.17 0.00 - 0.40 10*3/mm3    Basophils, Absolute 0.03 0.00 - 0.20 10*3/mm3    Immature Grans, Absolute 0.03 0.00 - 0.05 10*3/mm3    nRBC 0.0 0.0 - 0.2 /100 WBC   Comprehensive Metabolic Panel    Collection Time: 03/04/24 12:06 PM    Specimen: Blood   Result Value Ref Range    Glucose 209 (H) 65 - 99 mg/dL    BUN 11 8 - 23 mg/dL    Creatinine 0.82 0.76 - 1.27 mg/dL    Sodium 138 136 - 145 mmol/L    Potassium 4.0 3.5 - 5.2 mmol/L    Chloride 101 98 - 107 mmol/L    CO2 29.0 22.0 - 29.0 mmol/L    Calcium 8.9 8.6 - 10.5 mg/dL    Total Protein 7.5 6.0 - 8.5 g/dL    Albumin 4.2 3.5 - 5.2 g/dL    ALT (SGPT) 20 1 - 41 U/L    AST (SGOT) 18 1 - 40 U/L    Alkaline Phosphatase 93 39 - 117 U/L    Total Bilirubin 0.4 0.0 - 1.2 mg/dL    Globulin 3.3 gm/dL    A/G Ratio 1.3 g/dL    BUN/Creatinine Ratio 13.4 7.0 - 25.0    Anion Gap 8.0 5.0 - 15.0 mmol/L    eGFR 98.7 >60.0 mL/min/1.73   High Sensitivity Troponin T    Collection Time: 03/04/24 12:06 PM    Specimen: Blood   Result Value Ref Range    HS Troponin T 13 <22 ng/L       Ordered the above labs and reviewed the results.        RADIOLOGY  CT Abdomen Pelvis Without Contrast    Result Date: 3/4/2024  CT ABDOMEN AND PELVIS WITHOUT IV CONTRAST  HISTORY: Acute left leg swelling, worsening  TECHNIQUE: Radiation dose reduction techniques were utilized, including automated exposure control and exposure modulation based on body size. 3 mm images were obtained through the abdomen and pelvis without IV contrast.  COMPARISON: CT abdomen pelvis  11/19/2021  FINDINGS: Please note evaluation is suboptimal intravenous contrast. Bibasilar Laxacin or pleural parenchymal scarring. There are no findings of small bowel obstruction. The appendix is unremarkable. Mild intrahepatic bili duct dilation is present, as before. The gallbladder, pancreas, spleen, adrenal glands and kidneys have an unremarkable noncontrast CT appearance. No hydronephrosis. No abdominal pelvic adenopathy by size criteria.  Findings suggestive of constipation with a moderate to large amount of stool throughout the colon.  There is mild asymmetric fat stranding and soft tissue thickening overlying the right inguinal region. No free intraperitoneal air is seen  No suspicious lytic or blastic osseous lesion.      1.  Please note evaluation is suboptimal intravenous contrast. 2.  Findings suggestive of constipation. 3.  Asymmetric subcutaneous thickening and stranding overlying the right inguinal region, nonspecific. Correlation with physical exam is recommended. Please note CT cannot exclude the possibility of deep venous thrombosis given patient history. Findings can be further evaluated with Doppler sonogram if clinically indicated. 4.  Other findings as above.    This report was finalized on 3/4/2024 2:09 PM by Dr. Yaakov Britton M.D on Workstation: [id number]      Duplex Venous Lower Extremity - BILATERAL    Result Date: 3/4/2024    There was deep venous valvular incompetence noted in the left popliteal.   All other veins appeared normal bilaterally.     XR Chest 1 View    Result Date: 3/4/2024  XR CHEST 1 VW-  Clinical: The cardiomediastinal silhouette is stable in the right lung is clear. There are linear areas of discoid atelectasis within the left midlung zone. No vascular congestion or pleural effusion seen. No consolidation. The remainder is unremarkable.  This report was finalized on 3/4/2024 9:11 AM by Dr. Jaden Harrison M.D on Workstation: [id number]       Ordered the above noted  radiological studies. Reviewed by me in PACS.        MEDICATIONS GIVEN IN ER  Medications   sodium chloride 0.9 % flush 10 mL (has no administration in time range)   iopamidol (ISOVUE-300) 61 % injection 100 mL (100 mL Intravenous Not Given 3/4/24 1338)   cefTRIAXone (ROCEPHIN) 2,000 mg in sodium chloride 0.9 % 100 mL IVPB-VTB (has no administration in time range)         ORDERS PLACED DURING THIS VISIT:  Orders Placed This Encounter   Procedures    Blood Culture - Blood,    Blood Culture - Blood,    XR Chest 1 View    CT Abdomen Pelvis Without Contrast    Comprehensive Metabolic Panel    BNP    CBC Auto Differential    High Sensitivity Troponin T    Lactic Acid, Plasma    Monitor Blood Pressure    Pulse Oximetry, Continuous    ECG 12 Lead Dyspnea    Insert Peripheral IV    CBC & Differential         OUTPATIENT MEDICATION MANAGEMENT:  Current Facility-Administered Medications Ordered in Epic   Medication Dose Route Frequency Provider Last Rate Last Admin    cefTRIAXone (ROCEPHIN) 2,000 mg in sodium chloride 0.9 % 100 mL IVPB-VTB  2,000 mg Intravenous Once Babak Law II, MD        iopamidol (ISOVUE-300) 61 % injection 100 mL  100 mL Intravenous Once in imaging Babak Law II, MD        sodium chloride 0.9 % flush 10 mL  10 mL Intravenous PRN Babak Law II, MD         Current Outpatient Medications Ordered in Epic   Medication Sig Dispense Refill    amoxicillin (AMOXIL) 875 MG tablet Take 1 tablet by mouth 2 (Two) Times a Day. 20 tablet 0    bacitracin 500 UNIT/GM ointment Apply 1 application topically to the appropriate area as directed 2 (Two) Times a Day. 14 g 0    carvedilol (COREG) 3.125 MG tablet Take 1 tablet by mouth 2 (Two) Times a Day With Meals. 60 tablet 0    clonazePAM (KlonoPIN) 0.5 MG tablet Take 0.5 mg by mouth 2 (Two) Times a Day As Needed.      cloNIDine (CATAPRES) 0.1 MG tablet Take 1 tablet by mouth 3 (three) times a day. 21 tablet 0    DULoxetine (CYMBALTA) 30 MG  capsule Take 1 capsule by mouth daily. 7 capsule 0    furosemide (LASIX) 40 MG tablet Take  by mouth daily.      hydrOXYzine (ATARAX) 50 MG tablet Take 1 tablet by mouth 3 (three) times a day as needed for itching. 21 tablet 0    insulin detemir (LEVEMIR) 100 UNIT/ML injection Inject 20 Units under the skin daily. in am  12    Lidocaine Viscous HCl (XYLOCAINE) 2 % solution Take 10 mL by mouth As Needed for Mild Pain  or Moderate Pain . 20 mL 0    metFORMIN (GLUCOPHAGE) 500 MG tablet Take 500 mg by mouth 2 (Two) Times a Day With Meals.      methadone (DOLOPHINE) 10 MG tablet Take 20 mg by mouth 2 (Two) Times a Day,      pravastatin (PRAVACHOL) 20 MG tablet Take 2 tablets by mouth daily.      Zinc Sulfate 220 (50 Zn) MG tablet Take 220 mg by mouth.         PROCEDURES  Procedures          MEDICAL DECISION MAKING, PROGRESS, and CONSULTS    Discussion below represents my analysis of pertinent findings related to patient's condition, differential diagnosis, treatment plan and final disposition.            Differential diagnosis:    DVT, CHF, cirrhosis, nephrosis, medication side effect, cellulitis               Independent interpretation of labs, radiology studies, and discussions with consultants:  ED Course as of 03/04/24 1554   Mon Mar 04, 2024   0905 EKG independently interpreted by myself.  Time 8:46 AM.  Sinus rhythm.  Heart rate 71.  First-degree AV block.  Normal axis.  No acute ST abnormality. [TD]   0923 Chest x-ray independently interpreted by myself.  No vascular congestion noted. [TD]   1032 Ultrasound of the legs negative bilaterally. [TD]   1147 proBNP: 38.7 [TD]   1147 WBC: 6.08 [TD]   1147 Hemoglobin(!): 12.9 [TD]      ED Course User Index  [TD] Babak Law II, MD       Frankly, I this was negative.  Therefore, I get a CT scan of the abdomen and pelvis to evaluate for any intra-abdominal cause of his leg swelling.  This is also unremarkable except for the constipation.  I be surprised if  constipation would cause this kind of swelling.  At this point, my working diagnosis is that he had cellulitis of that right leg where he has overlying redness and mild warmth to the calf region.  I will admit him for IV and biotics.    I discussed the case with ELLE Grady who will admit to the observation unit.  Was commenced patient was going to have a DVT.        DIAGNOSIS  Final diagnoses:   Cellulitis of right leg         DISPOSITION  Admit      Latest Documented Vital Signs:  As of 15:54 EST  BP- (!) 165/104 HR- 80 Temp- 98.4 °F (36.9 °C) O2 sat- 100%      --    Please note that portions of this were completed with a voice recognition program.       Note Disclaimer: At [facility], we believe that sharing information builds trust and better relationships. You are receiving this note because you are receiving care at [facility] or recently visited. It is possible you will see health information before a provider has talked with you about it. This kind of information can be easy to misunderstand. To help you fully understand what it means for your health, we urge you to discuss this note with your provider.         Babak Law II, MD  03/05/24 4176

## 2024-03-04 NOTE — ED NOTES
Multiple attempts (x5) made to obtain blood cultures by ED tech and ED phleb. Unable to obtain. Charge RN notified and Lab contacted for assistance.

## 2024-03-04 NOTE — ED NOTES
Nursing report ED to floor  Kwame Andres  63 y.o.  male    HPI (triage note):   Chief Complaint   Patient presents with    Leg Swelling       Admitting doctor:   Brian Galeas MD    Admitting diagnosis:   The primary encounter diagnosis was Cellulitis of right leg. A diagnosis of Leg edema, right was also pertinent to this visit.    Code status:   Current Code Status       Date Active Code Status Order ID Comments User Context       Prior            Allergies:   Shrimp    Past Medical History:  Past Medical History:   Diagnosis Date    Arthritis     Chronic pain     Diabetes mellitus     Herniated thoracic disc without myelopathy     by right scapula    Hypertension     Narcotic dependence     Pneumonia     PTSD (post-traumatic stress disorder)     Torn Achilles tendon         Weight:       03/04/24  0843   Weight: 118 kg (260 lb)       Most recent vitals:   Vitals:    03/04/24 0919 03/04/24 1101 03/04/24 1153 03/04/24 1431   BP:    (!) 165/104   Pulse: 87 82 83 80   Resp:       Temp:       SpO2:  98% 99% 100%   Weight:       Height:           Active LDAs/IV Access:   Lines, Drains & Airways       Active LDAs       None                        Labs (abnormal labs have a star):   Labs Reviewed   COMPREHENSIVE METABOLIC PANEL - Abnormal; Notable for the following components:       Result Value    Glucose 209 (*)     All other components within normal limits    Narrative:     GFR Normal >60  Chronic Kidney Disease <60  Kidney Failure <15     CBC WITH AUTO DIFFERENTIAL - Abnormal; Notable for the following components:    Hemoglobin 12.9 (*)     MPV 12.6 (*)     Lymphocyte % 17.6 (*)     All other components within normal limits   BNP (IN-HOUSE) - Normal    Narrative:     This assay is used as an aid in the diagnosis of individuals suspected of having heart failure. It can be used as an aid in the diagnosis of acute decompensated heart failure (ADHF) in patients presenting with signs and symptoms of ADHF to the  emergency department (ED). In addition, NT-proBNP of <300 pg/mL indicates ADHF is not likely.    Age Range Result Interpretation  NT-proBNP Concentration (pg/mL:      <50             Positive            >450                   Gray                 300-450                    Negative             <300    50-75           Positive            >900                  Gray                300-900                  Negative            <300      >75             Positive            >1800                  Gray                300-1800                  Negative            <300   TROPONIN - Normal    Narrative:     High Sensitive Troponin T Reference Range:  <14.0 ng/L- Negative Female for AMI  <22.0 ng/L- Negative Male for AMI  >=14 - Abnormal Female indicating possible myocardial injury.  >=22 - Abnormal Male indicating possible myocardial injury.   Clinicians would have to utilize clinical acumen, EKG, Troponin, and serial changes to determine if it is an Acute Myocardial Infarction or myocardial injury due to an underlying chronic condition.        BLOOD CULTURE   BLOOD CULTURE   LACTIC ACID, PLASMA   CBC AND DIFFERENTIAL    Narrative:     The following orders were created for panel order CBC & Differential.  Procedure                               Abnormality         Status                     ---------                               -----------         ------                     CBC Auto Differential[[id number]]        Abnormal            Final result                 Please view results for these tests on the individual orders.       EKG:   ECG 12 Lead Dyspnea   Final Result   HEART RATE= 71  bpm   RR Interval= 845  ms   VA Interval= 236  ms   P Horizontal Axis= -84  deg   P Front Axis= 2  deg   QRSD Interval= 100  ms   QT Interval= 402  ms   QTcB= 437  ms   QRS Axis= 6  deg   T Wave Axis= 3  deg   - ABNORMAL ECG -   Sinus rhythm   Prolonged VA interval   When compared with ECG of 20-Sep-2016 1:56:19,   New conduction abnormality    Electronically Signed By: Jalyon Jaffe ([facility]) 04-Mar-2024 15:38:19   Date and Time of Study: 2024-03-04 08:46:08          Meds given in ED:   Medications   sodium chloride 0.9 % flush 10 mL (has no administration in time range)   iopamidol (ISOVUE-300) 61 % injection 100 mL (100 mL Intravenous Not Given 3/4/24 1338)   cefTRIAXone (ROCEPHIN) 2,000 mg in sodium chloride 0.9 % 100 mL IVPB-VTB (has no administration in time range)       Imaging results:  CT Abdomen Pelvis Without Contrast    Result Date: 3/4/2024  1.  Please note evaluation is suboptimal intravenous contrast. 2.  Findings suggestive of constipation. 3.  Asymmetric subcutaneous thickening and stranding overlying the right inguinal region, nonspecific. Correlation with physical exam is recommended. Please note CT cannot exclude the possibility of deep venous thrombosis given patient history. Findings can be further evaluated with Doppler sonogram if clinically indicated. 4.  Other findings as above.    This report was finalized on 3/4/2024 2:09 PM by Dr. Yaakov Britton M.D on Workstation: [id number]       Ambulatory status:   - SBA    Social issues:   Social History     Socioeconomic History    Marital status: Single   Tobacco Use    Smoking status: Never   Substance and Sexual Activity    Alcohol use: No    Drug use: No    Sexual activity: Defer          NIH Stroke Scale:         Michael Pinto RN  03/04/24 16:38 EST    Nurse Direct line for any questions: [phone]

## 2024-03-04 NOTE — ED NOTES
Attempted IV placement x2, unsuccessful. Patient has multiple scars/scar tissue across both arms. Charge notified and IV nurse consulted.

## 2024-03-04 NOTE — ED NOTES
Spoke with IV team due to patient needing IV placement for IV abx. Prior IV placed by IV team infiltrated.

## 2024-03-05 ENCOUNTER — READMISSION MANAGEMENT (OUTPATIENT)
Dept: CALL CENTER | Facility: HOSPITAL | Age: 64
End: 2024-03-05

## 2024-03-05 VITALS
DIASTOLIC BLOOD PRESSURE: 91 MMHG | HEIGHT: 74 IN | OXYGEN SATURATION: 92 % | SYSTOLIC BLOOD PRESSURE: 144 MMHG | TEMPERATURE: 97.8 F | WEIGHT: 260 LBS | BODY MASS INDEX: 33.37 KG/M2 | HEART RATE: 81 BPM | RESPIRATION RATE: 16 BRPM

## 2024-03-05 LAB
ANION GAP SERPL CALCULATED.3IONS-SCNC: 11.6 MMOL/L (ref 5–15)
BUN SERPL-MCNC: 11 MG/DL (ref 8–23)
BUN/CREAT SERPL: 13.8 (ref 7–25)
CALCIUM SPEC-SCNC: 9 MG/DL (ref 8.6–10.5)
CHLORIDE SERPL-SCNC: 98 MMOL/L (ref 98–107)
CO2 SERPL-SCNC: 27.4 MMOL/L (ref 22–29)
CREAT SERPL-MCNC: 0.8 MG/DL (ref 0.76–1.27)
DEPRECATED RDW RBC AUTO: 42.4 FL (ref 37–54)
EGFRCR SERPLBLD CKD-EPI 2021: 99.4 ML/MIN/1.73
ERYTHROCYTE [DISTWIDTH] IN BLOOD BY AUTOMATED COUNT: 12.8 % (ref 12.3–15.4)
GLUCOSE BLDC GLUCOMTR-MCNC: 162 MG/DL (ref 70–130)
GLUCOSE BLDC GLUCOMTR-MCNC: 215 MG/DL (ref 70–130)
GLUCOSE SERPL-MCNC: 170 MG/DL (ref 65–99)
HCT VFR BLD AUTO: 41 % (ref 37.5–51)
HGB BLD-MCNC: 13.5 G/DL (ref 13–17.7)
MCH RBC QN AUTO: 29.9 PG (ref 26.6–33)
MCHC RBC AUTO-ENTMCNC: 32.9 G/DL (ref 31.5–35.7)
MCV RBC AUTO: 90.9 FL (ref 79–97)
PLATELET # BLD AUTO: 186 10*3/MM3 (ref 140–450)
PMV BLD AUTO: 11.8 FL (ref 6–12)
POTASSIUM SERPL-SCNC: 5 MMOL/L (ref 3.5–5.2)
RBC # BLD AUTO: 4.51 10*6/MM3 (ref 4.14–5.8)
SODIUM SERPL-SCNC: 137 MMOL/L (ref 136–145)
WBC NRBC COR # BLD AUTO: 5.94 10*3/MM3 (ref 3.4–10.8)

## 2024-03-05 PROCEDURE — 63710000001 INSULIN LISPRO (HUMAN) PER 5 UNITS

## 2024-03-05 PROCEDURE — G0378 HOSPITAL OBSERVATION PER HR: HCPCS

## 2024-03-05 PROCEDURE — 85027 COMPLETE CBC AUTOMATED: CPT

## 2024-03-05 PROCEDURE — 82948 REAGENT STRIP/BLOOD GLUCOSE: CPT

## 2024-03-05 PROCEDURE — 99204 OFFICE O/P NEW MOD 45 MIN: CPT | Performed by: INTERNAL MEDICINE

## 2024-03-05 PROCEDURE — 80048 BASIC METABOLIC PNL TOTAL CA: CPT

## 2024-03-05 RX ORDER — CARVEDILOL 3.12 MG/1
3.12 TABLET ORAL 2 TIMES DAILY WITH MEALS
Qty: 60 TABLET | Refills: 0 | Status: SHIPPED | OUTPATIENT
Start: 2024-03-05 | End: 2024-03-05

## 2024-03-05 RX ORDER — TRIAMCINOLONE ACETONIDE 1 MG/G
1 OINTMENT TOPICAL EVERY 12 HOURS SCHEDULED
Qty: 15 G | Refills: 0 | Status: SHIPPED | OUTPATIENT
Start: 2024-03-05 | End: 2024-03-13

## 2024-03-05 RX ORDER — CARVEDILOL 3.12 MG/1
3.12 TABLET ORAL 2 TIMES DAILY WITH MEALS
Qty: 60 TABLET | Refills: 0 | Status: SHIPPED | OUTPATIENT
Start: 2024-03-05 | End: 2024-03-05 | Stop reason: HOSPADM

## 2024-03-05 RX ORDER — CARVEDILOL 3.12 MG/1
3.12 TABLET ORAL 2 TIMES DAILY WITH MEALS
Qty: 60 TABLET | Refills: 0 | Status: SHIPPED | OUTPATIENT
Start: 2024-03-05

## 2024-03-05 RX ORDER — TRIAMCINOLONE ACETONIDE 1 MG/G
1 OINTMENT TOPICAL EVERY 12 HOURS SCHEDULED
Qty: 14 G | Refills: 0 | Status: DISCONTINUED | OUTPATIENT
Start: 2024-03-05 | End: 2024-03-05 | Stop reason: HOSPADM

## 2024-03-05 RX ORDER — FUROSEMIDE 40 MG/1
40 TABLET ORAL DAILY
Qty: 30 TABLET | Refills: 0 | Status: SHIPPED | OUTPATIENT
Start: 2024-03-05

## 2024-03-05 RX ADMIN — METFORMIN HYDROCHLORIDE 500 MG: 500 TABLET, FILM COATED ORAL at 08:27

## 2024-03-05 RX ADMIN — Medication 10 ML: at 08:31

## 2024-03-05 RX ADMIN — DOCUSATE SODIUM 50MG AND SENNOSIDES 8.6MG 2 TABLET: 8.6; 5 TABLET, FILM COATED ORAL at 08:27

## 2024-03-05 RX ADMIN — FUROSEMIDE 40 MG: 40 TABLET ORAL at 08:27

## 2024-03-05 RX ADMIN — INSULIN LISPRO 4 UNITS: 100 INJECTION, SOLUTION INTRAVENOUS; SUBCUTANEOUS at 08:29

## 2024-03-05 RX ADMIN — CARVEDILOL 3.12 MG: 3.12 TABLET, FILM COATED ORAL at 08:27

## 2024-03-05 RX ADMIN — INSULIN LISPRO 2 UNITS: 100 INJECTION, SOLUTION INTRAVENOUS; SUBCUTANEOUS at 12:44

## 2024-03-05 NOTE — DISCHARGE INSTRUCTIONS
Begin carvedilol, Lasix, and metformin as prescribed.  You need compression stockings, these have been ordered, they will be supplied by [facility].  You can apply topical steroid as prescribed for up to 1 week.  We have also ordered a portable oxygen concentrator for you from [facility].    Follow-up with primary care, you have an appointment tomorrow morning at 9 AM with Dr. Valdivia to establish care.  Do not miss this appointment.    Return to the emergency department with worsening symptoms, uncontrolled pain, inability to tolerate oral liquids, fever greater than 101°F not controlled by Tylenol or as needed with emergent concerns.    Wash RLE with foaming soap and water, pat dry. Apply lotion to skin. Wrap with Kerlix and 2 ACE wraps toes to knee. Change every other day.

## 2024-03-05 NOTE — PLAN OF CARE
Goal Outcome Evaluation:patient came to obs unit this evening at 1830 for cellulitis of right lower extremity. Right lower leg is red , warm to touch, dark gray on foot with 3+ edema. Right leg, has 1+ edema with no discoloration. Patient received first dose of iv antibiotics in ed after blood cultures were drawn. Patient reports that he has not had any of his medications in an unknown amount of time, because he has no doctor. Patient does have history of methadone prescription and cardiovascular medications, but patient is unsure of when he last had these medications. Patient has had ct of abd/pelvis and doppler on lower extremities.

## 2024-03-05 NOTE — PLAN OF CARE
Goal Outcome Evaluation:         Patient admitted to the observation unit for treatment of Right leg cellulitis. Vss. Doppler negative. CT abdomin shows constipation. Case Management and spiritual care consulting. Will continue to monitor for any changes

## 2024-03-05 NOTE — DISCHARGE PLACEMENT REQUEST
"Kwame Lopez (63 y.o. Male)       Date of Birth   1960    Social Security Number       Address   [address] [place] [address]    Home Phone   [phone]    MRN   [id number]       Congregation   [employer]    Marital Status   Single                            Admission Date   3/4/24    Admission Type   Emergency    Admitting Provider   Brian Galeas MD    Attending Provider   Brian Galeas MD    Department, Room/Bed   [facility] OBSERVATION, 116/1       Discharge Date       Discharge Disposition       Discharge Destination                                 Attending Provider: Brian Galeas MD    Allergies: Shrimp    Isolation: None   Infection: MRSA/History Only (11/19/21)   Code Status: CPR    Ht: 188 cm (74\")   Wt: 118 kg (260 lb)    Admission Cmt: None   Principal Problem: Left leg cellulitis [L03.116]                   Active Insurance as of 3/4/2024       Primary Coverage       Payor Plan Insurance Group Employer/Plan Group    [facility] BY MARJ [id number]       Payor Plan Address Payor Plan Phone Number Payor Plan Fax Number Effective Dates    [address] [address]   1/1/2021 - None Entered    [facility] [id number]         Subscriber Name Subscriber Birth Date Member ID       KWAME LOPEZ 1960 [id number]                     Emergency Contacts        (Rel.) Home Phone Work Phone Mobile Phone    PryorJose Angel saenz (Father) [phone] -- [phone]    ZAC (CARETAKER)MAGDIEL (Sister) -- -- [phone]            "

## 2024-03-05 NOTE — PLAN OF CARE
Goal Outcome Evaluation:               Patient left observation unit at this time with all his belongings, pt will be calling a taxi to go home fromt the ED

## 2024-03-05 NOTE — CASE MANAGEMENT/SOCIAL WORK
Discharge Planning Assessment  [facility]     Patient Name: Kwame Andres  MRN: [id number]  Today's Date: 3/5/2024    Admit Date: 3/4/2024    Plan: Home   Discharge Needs Assessment    No documentation.                  Discharge Plan       Row Name 03/05/24 9346       Plan    Plan Comments Received call from jaron's primary RN informing that patient is unable to afford his discharge medications. I contacted the retail pharmacy and verified the cost of his three medications (about 40 dollars) and agreed to have them billed to CCP. Contacted primary RN to update. AFRICA Mar RN      Row Name 03/05/24 1416       Plan    Plan Comments CCP spoke with Luigi/Cecy; she delivered portable 02 tank to patient's bedside for transport home. Carmen DARLING      Row Name 03/05/24 1227       Plan    Final Discharge Disposition Code 01 - home or self-care                  Continued Care and Services - Admitted Since 3/4/2024       Durable Medical Equipment       Service Provider Request Status Selected Services Address Phone Fax Patient Preferred    [facility] Accepted N/A [address] [facility] [address] [phone] --                  Expected Discharge Date and Time       Expected Discharge Date Expected Discharge Time    Mar 5, 2024            Demographic Summary    No documentation.                  Functional Status    No documentation.                  Psychosocial    No documentation.                  Abuse/Neglect    No documentation.                  Legal    No documentation.                  Substance Abuse    No documentation.                  Patient Forms    No documentation.                     Noam Kathleen, RN

## 2024-03-05 NOTE — CONSULTS
met with Pt at their request. Pt requested [employer]  or  for their spiritual support.  used [facility], and talked with Deacon Palumbo who is planning to visit Pt in the afternoon. Pt and RN informed.  remains available and will coordinate with Deacon Palumbo as needed.

## 2024-03-05 NOTE — CONSULTS
Referring Provider: Brian Galeas MD      Subjective   History of present illness:  63-year-old we are see for cellulitis, briefly he reports a several weeks history of right lower extremity swelling that started in the foot and slowly worked its way up the ankle, shin and to the thigh.  He denies any fevers or chills or night sweats.  He has been having some pain and pruritus.  No drainage.  He has been off his Lasix for some time given his physician had .  Finally he presented to the emergency department yesterday was admitted to the observation unit and started on ceftriaxone.  His white count is normal and is afebrile.      Physical Exam:   Vital Signs   Temp:  [97.7 °F (36.5 °C)-98 °F (36.7 °C)] 97.8 °F (36.6 °C)  Heart Rate:  [72-87] 76  Resp:  [16-18] 18  BP: (139-165)/() 150/77    GENERAL: Awake and alert, in no acute distress.   HEENT: Oropharynx is clear. Hearing is grossly normal.   EYES: . No conjunctival injection. No lid lag.   LUNGS:normal respiratory effort.   SKIN: Mild swelling over right lower extremity and some of the left ankle without impressive erythema.   PSYCHIATRIC: Appropriate mood, affect, insight, and judgment.     Results Review:  White count 6.1, hemoglobin 12.9, platelets 213  Creatinine 0.8  Glucose 209 through 215, hemoglobin A1c 8.4  Bilateral lower extremity duplex venography no DVT  Chest x-ray atelectasis without effusion or congestion        A/p  1.  Venous stasis dermatitis  2.  Possible lymphedema  3.  Diabetes mellitus type 2, continue glycemic control efforts to prevent/control infectious complications    The long duration of symptoms, pruritus, lack of fevers or leukocytosis and unimpressive erythema all suggest noninfectious etiology such as dermatitis, likely from venous stasis in the setting of being off chronic furosemide or from lymphedema.  We will stop antibiotics.  I will add topical steroids to help control some of the pruritus.  We discussed the  importance of compression measures.    No objection to discharge when okay with others.           Thank you for this consult.  We will be happy to reevaluate should infectious concerns evolve

## 2024-03-05 NOTE — DISCHARGE SUMMARY
ED OBSERVATION PROGRESS/DISCHARGE SUMMARY    Date of Admission: 3/4/2024   LOS: 0 days   PCP: Provider, No Known    Subjective  patient reports feeling improved following application of Ace bandage applied by wound care nurse.    Hospital Outcome: 63-year-old male admitted to the observation unit for further evaluation of suspected right lower extremity cellulitis.  Patient did receive a dose of Rocephin at time of admission.  Patient was seen by infectious disease, Dr. Rivas.  He feels patient's presenting symptoms suggest noninfectious etiology such as venous stasis dermatitis.  He recommends stopping antibiotics at this time.    Patient was seen by wound care nurse, recommends compression stockings.  In interim, wrapped leg with Kerlix and 2 Ace bandages.  These can remain in place for 2 to 3 days until patient is able to obtain compression stockings.  [facility] order placed for compression stockings at discharge.    Patient has reportedly been off of all of his prescription medications for several months due to the loss of his primary care provider.  We will reinitiate carvedilol, Lasix, and metformin at discharge.  Patient has appointment with new primary care provider scheduled for tomorrow March 6 at 9 AM.    [facility] asked me to place order for transportable oxygen concentrator.  These orders have been placed prior to discharge.    I discussed all of the above with patient who expressed understanding and is in agreement with plan.  He has appointment tomorrow morning 9 AM to establish care with new primary care provider.  Usual return to ER precautions advised, patient expresses understanding and is in agreement with plan.    ROS:  General: no fevers, chills  Respiratory: no cough, dyspnea  Cardiovascular: no chest pain, palpitations  Abdomen: No abdominal pain, nausea, vomiting, or diarrhea  Neurologic: No focal weakness    Objective   Physical Exam:  I have reviewed the vital signs.  Temp:  [97.7 °F (36.5 °C)-98.4  °F (36.9 °C)] 97.8 °F (36.6 °C)  Heart Rate:  [72-87] 76  Resp:  [16-18] 18  BP: (139-168)/() 150/77  General Appearance:    Alert, cooperative, no distress  Head:    Normocephalic, atraumatic  Eyes:    Sclerae anicteric  Neck:   Supple, no mass  Lungs: Clear to auscultation bilaterally, respirations unlabored  Heart: Regular rate and rhythm, S1 and S2 normal, no murmur, rub or gallop  Abdomen:  Soft, nontender, bowel sounds active, nondistended  Extremities: No clubbing, cyanosis, or edema to lower extremities  Pulses:  2+ and symmetric in distal lower extremities  Skin: No rashes   Neurologic: Oriented x3, Normal strength to extremities    Results Review:    I have reviewed the labs, radiology results and diagnostic studies.    Results from last 7 days   Lab Units 03/04/24  1101   WBC 10*3/mm3 6.08   HEMOGLOBIN g/dL 12.9*   HEMATOCRIT % 38.2   PLATELETS 10*3/mm3 213     Results from last 7 days   Lab Units 03/04/24  1206   SODIUM mmol/L 138   POTASSIUM mmol/L 4.0   CHLORIDE mmol/L 101   CO2 mmol/L 29.0   BUN mg/dL 11   CREATININE mg/dL 0.82   CALCIUM mg/dL 8.9   BILIRUBIN mg/dL 0.4   ALK PHOS U/L 93   ALT (SGPT) U/L 20   AST (SGOT) U/L 18   GLUCOSE mg/dL 209*     Imaging Results (Last 24 Hours)       Procedure Component Value Units Date/Time    CT Abdomen Pelvis Without Contrast [[id number]] Collected: 03/04/24 1401     Updated: 03/04/24 1413    Narrative:      CT ABDOMEN AND PELVIS WITHOUT IV CONTRAST     HISTORY: Acute left leg swelling, worsening     TECHNIQUE: Radiation dose reduction techniques were utilized, including  automated exposure control and exposure modulation based on body size.   3 mm images were obtained through the abdomen and pelvis without IV  contrast.     COMPARISON: CT abdomen pelvis 11/19/2021     FINDINGS:  Please note evaluation is suboptimal intravenous contrast. Bibasilar  Laxacin or pleural parenchymal scarring. There are no findings of small  bowel obstruction. The appendix is  unremarkable. Mild intrahepatic bili  duct dilation is present, as before. The gallbladder, pancreas, spleen,  adrenal glands and kidneys have an unremarkable noncontrast CT  appearance. No hydronephrosis. No abdominal pelvic adenopathy by size  criteria.     Findings suggestive of constipation with a moderate to large amount of  stool throughout the colon.     There is mild asymmetric fat stranding and soft tissue thickening  overlying the right inguinal region. No free intraperitoneal air is seen     No suspicious lytic or blastic osseous lesion.       Impression:      1.  Please note evaluation is suboptimal intravenous contrast.  2.  Findings suggestive of constipation.  3.  Asymmetric subcutaneous thickening and stranding overlying the right  inguinal region, nonspecific. Correlation with physical exam is  recommended. Please note CT cannot exclude the possibility of deep  venous thrombosis given patient history. Findings can be further  evaluated with Doppler sonogram if clinically indicated.  4.  Other findings as above.           This report was finalized on 3/4/2024 2:09 PM by Dr. Yaakov Britton M.D  on Workstation: [id number]       XR Chest 1 View [[id number]] Collected: 03/04/24 0910     Updated: 03/04/24 0914    Narrative:      XR CHEST 1 VW-     Clinical: The cardiomediastinal silhouette is stable in the right lung  is clear. There are linear areas of discoid atelectasis within the left  midlung zone. No vascular congestion or pleural effusion seen. No  consolidation. The remainder is unremarkable.     This report was finalized on 3/4/2024 9:11 AM by Dr. Jaden Harrison M.D  on Workstation: [id number]               I have reviewed the medications.  ---------------------------------------------------------------------------------------------  Assessment & Plan   Assessment/Problem List    Cellulitis of leg, right      Plan:    Redness and swelling right lower extremity  -Venous Doppler negative for DVT, shows  deep venous vascular incompetence noted in the left popliteal.  -Rocephin 2 g IV every 24 hours  -Tylenol as needed for pain control  -Cardiac monitoring  -Vital signs every 4 hours  -Continuous pulse ox  -Infectious disease consult, appears noninfectious, stop antibiotics  -Wound care consult, wrapped with Ace bandages and recommend compression stockings  -Follow-up outpatient with primary care provider, appointment scheduled tomorrow 9 AM with Dr. Valdivia      Diabetes  -Accu-Cheks AC and at bedtime  -Adult subcutaneous insulin management-moderate dose  -Hemoglobin A1c -8.4  -Resume metformin 1000 mg p.o. twice daily     Hypertension  -Blood pressure is elevated  -Restart Coreg and Lasix    COPD  -Patient uses O2 as needed at home  -Portable concentrator ordered per CCP request   -Follow up with PCP as scheduled     Disposition: Home    Follow-up after Discharge: PCP    39 minutes has been spent by [facility] providers in the care of this patient while under observation status     This note will serve as discharge summary    ELLE Lopez 03/05/24 12:00 EST    I have worn appropriate PPE during this patient encounter and sanitized my hands with entering and exiting patient room.

## 2024-03-05 NOTE — PROGRESS NOTES
MD ATTESTATION NOTE    The NORMA and I have discussed this patient's history, physical exam, and treatment plan.  I have reviewed the documentation and personally had a face to face interaction with the patient. I affirm the documentation and agree with the treatment and plan.  The attached note describes my personal findings.      I provided a substantive portion of the care of the patient.  I personally performed the physical exam in its entirety, and below are my findings.       Brief HPI: This patient is a 63-year-old male admitted to the observation unit due to cellulitis of his right lower extremity.  The patient does report that he has not been taking his medications now for quite some time.  He does report intense swelling in that right lower extremity with some discomfort.  He denies fever/chills, chest pain, shortness of breath, or nausea/vomiting.      PHYSICAL EXAM  ED Triage Vitals   Temp Heart Rate Resp BP SpO2   03/04/24 0817 03/04/24 0817 03/04/24 0817 03/04/24 0817 03/04/24 0817   98.4 °F (36.9 °C) 78 18 168/72 96 %      Temp src Heart Rate Source Patient Position BP Location FiO2 (%)   03/04/24 1829 03/04/24 1829 03/04/24 1808 03/04/24 1808 --   Oral Monitor Lying Right arm        GENERAL: Resting comfortably and in no acute distress, nontoxic in appearance  HENT: nares patent  EYES: no scleral icterus  CV: regular rhythm, normal rate, no M/R/G  RESPIRATORY: normal effort, lungs clear bilaterally  ABDOMEN: soft, nontender, no rebound or guarding  MUSCULOSKELETAL: no deformity, right lower extremity swelling with no obvious warmth or tenderness.  Compartments soft and pulses palpable.  NEURO: alert, moves all extremities, follows commands  PSYCH:  calm, cooperative  SKIN: warm, dry    Vital signs and nursing notes reviewed.        Plan: Bilateral duplex venous Doppler ultrasounds are negative for acute DVT/SVT.  We will continue to treat with IV antibiotics and reassess closely.

## 2024-03-05 NOTE — CASE MANAGEMENT/SOCIAL WORK
Continued Stay Note  Psychiatric     Patient Name: Kwame Andres  MRN: [id number]  Today's Date: 3/5/2024    Admit Date: 3/4/2024    Plan: Home   Discharge Plan       Row Name 03/05/24 1413       Plan    Plan Comments CCP spoke with [facility]; she delivered portable 02 tank to patient's bedside for transport home. Carmen DARLING      Row Name 03/05/24 1220       Plan    Final Discharge Disposition Code 01 - home or self-care      Row Name 03/05/24 1117       Plan    Plan Home    Plan Comments CCP met with patient at bedside. CCP role explained and discharge planning discussed. Face sheet verified. Patient does not have a PCP; patient agreeable to CCP arranging PCP appointment. CCP arranged PCP appointment for March 6th at 9:00 A.M with Dr. Valdivia. CCP provided patient with appointment information. Patient lives with his sister. Patient uses a walker and cane for mobility depending on the day. Patient reports his sister assists him as needed. Patient has not used home health or been to SNF. Patient has home 02 through [facility]. Patient requesting smaller portable 02 tank. CCP notified [facility]; states it takes a week for them to be delivered. Patient reports he does not have a portable tank for transportation home. CCP left voicemail for [facility] to see if they can provide 02 tank for patient to go home. Patient's family to transport home. Carmen DARLING                   Discharge Codes    No documentation.                 Expected Discharge Date and Time       Expected Discharge Date Expected Discharge Time    Mar 5, 2024               PHONG Mckinney

## 2024-03-05 NOTE — CASE MANAGEMENT/SOCIAL WORK
Discharge Planning Assessment  [facility]     Patient Name: Kwame Andres  MRN: [id number]  Today's Date: 3/5/2024    Admit Date: 3/4/2024    Plan: Home   Discharge Needs Assessment       Row Name 03/05/24 1116       Living Environment    People in Home sibling(s)    Current Living Arrangements home    Potentially Unsafe Housing Conditions none    Primary Care Provided by self    Provides Primary Care For no one    Family Caregiver if Needed sibling(s)    Quality of Family Relationships involved;helpful;supportive    Able to Return to Prior Arrangements yes       Resource/Environmental Concerns    Resource/Environmental Concerns none       Transition Planning    Patient/Family Anticipates Transition to home with family    Patient/Family Anticipated Services at Transition none    Transportation Anticipated family or friend will provide       Discharge Needs Assessment    Readmission Within the Last 30 Days no previous admission in last 30 days    Equipment Currently Used at Home oxygen    Concerns to be Addressed no discharge needs identified;denies needs/concerns at this time    Equipment Needed After Discharge none                   Discharge Plan       Row Name 03/05/24 1117       Plan    Plan Home    Plan Comments CCP met with patient at bedside. CCP role explained and discharge planning discussed. Face sheet verified. Patient does not have a PCP; patient agreeable to CCP arranging PCP appointment. CCP arranged PCP appointment for March 6th at 9:00 A.M with Dr. Valdivia. CCP provided patient with appointment information. Patient lives with his sister. Patient uses a walker and cane for mobility depending on the day. Patient reports his sister assists him as needed. Patient has not used home health or been to SNF. Patient has home 02 through [facility]. Patient requesting smaller portable 02 tank. CCP notified [facility]; states it takes a week for them to be delivered. Patient reports he does not have a  portable tank for transportation home. CCP left voicemail for [facility] to see if they can provide 02 tank for patient to go home. Patient's family to transport home. Carmen DARLING                  Continued Care and Services - Admitted Since 3/4/2024       Durable Medical Equipment       Service Provider Request Status Selected Services Address Phone Fax Patient Preferred    [facility] Pending - Request Sent N/A [address] [facility] [address], [place] [facility] [address] [phone] --                     Demographic Summary       Row Name 03/05/24 1116       General Information    Admission Type observation    Arrived From emergency department    Referral Source admission list    Reason for Consult discharge planning    Preferred Language English                   Functional Status       Row Name 03/05/24 1116       Functional Status    Usual Activity Tolerance good    Current Activity Tolerance good       Functional Status, IADL    Medications independent    Meal Preparation independent    Housekeeping independent    Laundry independent    Shopping independent       Mental Status    General Appearance WDL WDL       Mental Status Summary    Recent Changes in Mental Status/Cognitive Functioning no changes                   Psychosocial    No documentation.                  Abuse/Neglect    No documentation.                  Legal    No documentation.                  Substance Abuse    No documentation.                  Patient Forms    No documentation.                     PHONG Mckinney

## 2024-03-05 NOTE — NURSING NOTE
CWON note: pt seen for evaluation of RLE. He has already been evaluated by infectious disease and they felt like his edema was related to venous stasis vs lymphedema instead of cellulitis. RLE without appreciable edema noted, pedal pulses are palpable. Minimal dermatitis, no erythema noted. Pt reports his leg was edematous upon arrival and would like his leg wrapped We discussed use of compression stocking which would be helpful going forward. In the meantime, after washing his leg with foaming soap, I wrapped him with Kerlix and 2 ACE wraps toes to knee. This can remain in place 2-3 days to give him time to get compression stockings. He has an appointment with his PCP tomorrow.

## 2024-03-05 NOTE — H&P
"[facility]   HISTORY AND PHYSICAL    Patient Name: Kwame Andres  : 1960  MRN: [id number]  Primary Care Physician:  Provider, No Known  Date of admission: 3/4/2024    Subjective   Subjective     Chief Complaint:   Chief Complaint   Patient presents with    Leg Swelling         HPI:    Kwame Andres is a 63 y.o. male, with a past medical history including, but not limited to, hypertension, diabetes, chronic pain, and PTSD, presented to the emergency department with a complaint of right lower leg swelling and tenderness over the past 2 weeks.  He denies any injury.  He denies any chest pain, shortness of breath, fever, chills, or any other complaints.  Of note patient has been off all of his medications for an unknown amount of time secondary to \" my doctor \".   In the emergency department he was given 2 g of Rocephin IV.  Blood cultures x 2 are pending at this time.    Review of Systems   All systems were reviewed and negative except for: What was mentioned above in the HPI    Personal History     Past Medical History:   Diagnosis Date    Arthritis     Chronic pain     Diabetes mellitus     Herniated thoracic disc without myelopathy     by right scapula    Hypertension     Narcotic dependence     Pneumonia     PTSD (post-traumatic stress disorder)     Torn Achilles tendon        Past Surgical History:   Procedure Laterality Date    HIP SURGERY Right        Family History: family history includes Cancer in his mother; Hypertension in his mother. Otherwise pertinent FHx was reviewed and not pertinent to current issue.    Social History:  reports that he has never smoked. He does not have any smokeless tobacco history on file. He reports that he does not drink alcohol and does not use drugs.    Home Medications:  DULoxetine, Lidocaine Viscous HCl, Zinc Sulfate, bacitracin, carvedilol, cloNIDine, clonazePAM, furosemide, hydrOXYzine, insulin detemir, metFORMIN, methadone, and " "pravastatin    Allergies:  Allergies   Allergen Reactions    Shrimp Other (See Comments)     \"break out in a sweat\", recent reaction       Objective   Objective     Vitals:   Temp:  [97.7 °F (36.5 °C)-98.4 °F (36.9 °C)] 97.7 °F (36.5 °C)  Heart Rate:  [78-87] 78  Resp:  [18] 18  BP: (139-168)/() 139/82  Flow (L/min):  [0-2] 0  Physical Exam    Constitutional: Awake, alert   Eyes: PERRLA, sclerae anicteric, no conjunctival injection   HENT: NCAT, mucous membranes moist   Neck: Supple, trachea midline   Respiratory: Clear to auscultation bilaterally, nonlabored respirations    Cardiovascular: Regular rhythm palpable pedal pulses bilaterally   Gastrointestinal: Positive bowel sounds, soft, nontender, nondistended   Musculoskeletal: 1+ ankle edema,    Psychiatric: Flat affect, cooperative   Neurologic: Oriented x 3, strength symmetric in all extremities,  speech clear   Skin: No rashes     Result Review    Result Review:  I have personally reviewed the results from the time of this admission to 3/4/2024 21:47 EST and agree with these findings:  [x]  Laboratory list / accordion  [x]  Microbiology  [x]  Radiology  [x]  EKG/Telemetry   [x]  Cardiology/Vascular   []  Pathology  [x]  Old records  []  Other:    Lab work to the emergency department shows a glucose level of 209, hemoglobin 12.9.  CT abdomen pelvis shows constipation, asymmetric subcutaneous thickening and stranding overlying the right inguinal region, nonspecific.  Venous Doppler lower extremities shows deep venous and vascular incompetence noted in the left popliteal.  All other veins appeared normal.      Assessment & Plan   Assessment / Plan     Brief Patient Summary:  Kwame Andres is a 63 y.o. male who was admitted to the observation unit for further evaluation and treatment of cellulitis of his right lower extremity.    Active Hospital Problems:  Active Hospital Problems    Diagnosis     Cellulitis of leg, right      Plan:     Cellulitis right " lower extremity  -Venous Doppler negative for DVT, shows deep venous vascular incompetence noted in the left popliteal.  -Rocephin 2 g IV every 24 hours  -Tylenol as needed for pain control  -Cardiac monitoring  -Vital signs every 4 hours  -Continuous pulse ox  -Repeat labs in a.m.    Diabetes  -Accu-Cheks AC and at bedtime  -Adult subcutaneous insulin management-moderate dose  -Hemoglobin A1c -8.4  -Resume metformin 500 mg p.o. twice daily    Hypertension  -Blood pressure is elevated  -Restart Coreg and Lasix        DVT prophylaxis:  Mechanical DVT prophylaxis orders are present.        CODE STATUS:    Code Status (Patient has no pulse and is not breathing): CPR (Attempt to Resuscitate)  Medical Interventions (Patient has pulse or is breathing): Full Support    Admission Status:  I believe this patient meets observation status.    80 minutes have been spent by [facility] providers in the care of this patient while under observation status.      Appropriate PPE worn during patient encounter.  Hand hygeine performed before and after seeing the patient.      Electronically signed by GRZEGORZ Miller, 03/04/24, 9:47 PM EST.

## 2024-03-06 ENCOUNTER — TRANSITIONAL CARE MANAGEMENT TELEPHONE ENCOUNTER (OUTPATIENT)
Dept: CALL CENTER | Facility: HOSPITAL | Age: 64
End: 2024-03-06

## 2024-03-06 NOTE — OUTREACH NOTE
Call Center TCM Note      Flowsheet Row Responses   [facility] patient discharged from? [place]   Does the patient have one of the following disease processes/diagnoses(primary or secondary)? Other   TCM attempt successful? Yes   Call start time 1639   Call end time 1646   Discharge diagnosis Left leg cellulitis, DM, HTN, COPD   Is patient permission given to speak with other caregiver? Yes   List who call center can speak with sister &caretaker Yordy German   Person spoke with today (if not patient) and relationship patient and sister   Meds reviewed with patient/caregiver? Yes   Does the patient have all medications ordered at discharge? Yes  [Patient has all of the medications that the hospital filled. He does not have everything on his continued med list.]   Is the patient taking all medications as directed (includes completed medication regime)? No   What is preventing the patient from taking all medications as directed? --  [Patient reports needs to see Dr to get all of his medications refilled.]   Nursing Interventions --  [Messaged PCP office to schedule appt.]   Comments Patient was listed for PCP appt with Carmella LANTIGUA for 3/12 but it shows cancelled. Patient reports that they wanted to see provider at [facility] office and needs for appt to be rescheduled please. Message routed to office.   Does the patient have an appointment with their PCP within 7-14 days of discharge? No   Nursing Interventions Routed TCM call to PCP office, PCP office requested to make appointment - message sent   Has home health visited the patient within 72 hours of discharge? N/A   What DME was ordered? O22L prn, Compression stockings   Psychosocial issues? No   Did the patient receive a copy of their discharge instructions? Yes   Nursing interventions Reviewed instructions with patient   What is the patient's perception of their health status since discharge? Improving   Is the patient/caregiver able to teach back  signs and symptoms related to disease process for when to call PCP? Yes   Is the patient/caregiver able to teach back signs and symptoms related to disease process for when to call 911? Yes   Is the patient/caregiver able to teach back the hierarchy of who to call/visit for symptoms/problems? PCP, Specialist, Home health nurse, Urgent Care, ED, 911 Yes   If the patient is a current smoker, are they able to teach back resources for cessation? Not a smoker   TCM call completed? Yes   Call end time 1646   Would this patient benefit from a Referral to [facility] Social Work? No   Is the patient interested in additional calls from an ambulatory ? No            Lavonne Miller, DAYO    3/6/2024, 16:52 EST

## 2024-03-06 NOTE — OUTREACH NOTE
Prep Survey      Flowsheet Row Responses   [facility] patient discharged from? [facility]   Is LACE score < 7 ? Yes   Eligibility [facility]   Date of Admission 03/04/24   Date of Discharge 03/05/24   Discharge Disposition Home or Self Care   Discharge diagnosis Left leg cellulitis   Does the patient have one of the following disease processes/diagnoses(primary or secondary)? Other   Does the patient have Home health ordered? No   Is there a DME ordered? Yes   What DME was ordered? [employer] for for portable O2 tank   Comments regarding appointments New PCP appt   Prep survey completed? Yes            CESAR OHARA - Registered Nurse

## 2024-03-06 NOTE — OUTREACH NOTE
Call Center TCM Note      Flowsheet Row Responses   [facility] patient discharged from? [place]   Does the patient have one of the following disease processes/diagnoses(primary or secondary)? Other   TCM attempt successful? No   Unsuccessful attempts Attempt 1            Lavonne Miller RN    3/6/2024, 13:57 EST

## 2024-03-07 NOTE — PROGRESS NOTES
Called patient talked to care giver and let her know I was calling to get appointment scheduled he can call back to get appointment with nurse Carmella and she ask if it was a doctor I said a NP SHE SAID HE NEED A DOCTOR and said he had appointment with a doctor on [place] I said you all can call Caodaism back and scheduled appointment with that doctor she said ok

## 2024-03-09 LAB
BACTERIA SPEC AEROBE CULT: NORMAL
BACTERIA SPEC AEROBE CULT: NORMAL

## 2025-03-13 ENCOUNTER — APPOINTMENT (OUTPATIENT)
Dept: GENERAL RADIOLOGY | Facility: HOSPITAL | Age: 65
End: 2025-03-13
Payer: MEDICAID

## 2025-03-13 ENCOUNTER — HOSPITAL ENCOUNTER (OUTPATIENT)
Facility: HOSPITAL | Age: 65
Setting detail: OBSERVATION
Discharge: HOME | End: 2025-03-15
Attending: EMERGENCY MEDICINE | Admitting: INTERNAL MEDICINE
Payer: MEDICAID

## 2025-03-13 DIAGNOSIS — R93.5 ABNORMAL CT OF THE ABDOMEN: ICD-10-CM

## 2025-03-13 DIAGNOSIS — K59.00 CONSTIPATION, UNSPECIFIED CONSTIPATION TYPE: ICD-10-CM

## 2025-03-13 DIAGNOSIS — R00.2 PALPITATIONS: ICD-10-CM

## 2025-03-13 DIAGNOSIS — R10.84 GENERALIZED ABDOMINAL PAIN: Primary | ICD-10-CM

## 2025-03-13 DIAGNOSIS — R09.02 HYPOXIA: ICD-10-CM

## 2025-03-13 DIAGNOSIS — R07.9 CHEST PAIN, UNSPECIFIED TYPE: ICD-10-CM

## 2025-03-13 LAB
ALBUMIN SERPL-MCNC: 3.9 G/DL (ref 3.5–5.2)
ALBUMIN/GLOB SERPL: 0.9 G/DL
ALP SERPL-CCNC: 87 U/L (ref 39–117)
ALT SERPL W P-5'-P-CCNC: 38 U/L (ref 1–41)
ANION GAP SERPL CALCULATED.3IONS-SCNC: 10.6 MMOL/L (ref 5–15)
AST SERPL-CCNC: 24 U/L (ref 1–40)
B PARAPERT DNA SPEC QL NAA+PROBE: NOT DETECTED
B PERT DNA SPEC QL NAA+PROBE: NOT DETECTED
BASOPHILS # BLD AUTO: 0.04 10*3/MM3 (ref 0–0.2)
BASOPHILS NFR BLD AUTO: 0.4 % (ref 0–1.5)
BILIRUB SERPL-MCNC: 0.3 MG/DL (ref 0–1.2)
BUN SERPL-MCNC: 18 MG/DL (ref 8–23)
BUN/CREAT SERPL: 15.4 (ref 7–25)
C PNEUM DNA NPH QL NAA+NON-PROBE: NOT DETECTED
CALCIUM SPEC-SCNC: 9.4 MG/DL (ref 8.6–10.5)
CHLORIDE SERPL-SCNC: 95 MMOL/L (ref 98–107)
CO2 SERPL-SCNC: 30.4 MMOL/L (ref 22–29)
CREAT SERPL-MCNC: 1.17 MG/DL (ref 0.76–1.27)
DEPRECATED RDW RBC AUTO: 40.5 FL (ref 37–54)
EGFRCR SERPLBLD CKD-EPI 2021: 69.6 ML/MIN/1.73
EOSINOPHIL # BLD AUTO: 0.17 10*3/MM3 (ref 0–0.4)
EOSINOPHIL NFR BLD AUTO: 1.7 % (ref 0.3–6.2)
ERYTHROCYTE [DISTWIDTH] IN BLOOD BY AUTOMATED COUNT: 12.3 % (ref 12.3–15.4)
FLUAV SUBTYP SPEC NAA+PROBE: NOT DETECTED
FLUBV RNA ISLT QL NAA+PROBE: NOT DETECTED
GLOBULIN UR ELPH-MCNC: 4.2 GM/DL
GLUCOSE SERPL-MCNC: 173 MG/DL (ref 65–99)
HADV DNA SPEC NAA+PROBE: NOT DETECTED
HCOV 229E RNA SPEC QL NAA+PROBE: NOT DETECTED
HCOV HKU1 RNA SPEC QL NAA+PROBE: NOT DETECTED
HCOV NL63 RNA SPEC QL NAA+PROBE: NOT DETECTED
HCOV OC43 RNA SPEC QL NAA+PROBE: NOT DETECTED
HCT VFR BLD AUTO: 43.7 % (ref 37.5–51)
HGB BLD-MCNC: 14.7 G/DL (ref 13–17.7)
HMPV RNA NPH QL NAA+NON-PROBE: NOT DETECTED
HPIV1 RNA ISLT QL NAA+PROBE: NOT DETECTED
HPIV2 RNA SPEC QL NAA+PROBE: NOT DETECTED
HPIV3 RNA NPH QL NAA+PROBE: NOT DETECTED
HPIV4 P GENE NPH QL NAA+PROBE: NOT DETECTED
IMM GRANULOCYTES # BLD AUTO: 0.04 10*3/MM3 (ref 0–0.05)
IMM GRANULOCYTES NFR BLD AUTO: 0.4 % (ref 0–0.5)
LIPASE SERPL-CCNC: 15 U/L (ref 13–60)
LYMPHOCYTES # BLD AUTO: 1.81 10*3/MM3 (ref 0.7–3.1)
LYMPHOCYTES NFR BLD AUTO: 18 % (ref 19.6–45.3)
M PNEUMO IGG SER IA-ACNC: NOT DETECTED
MCH RBC QN AUTO: 30.4 PG (ref 26.6–33)
MCHC RBC AUTO-ENTMCNC: 33.6 G/DL (ref 31.5–35.7)
MCV RBC AUTO: 90.3 FL (ref 79–97)
MONOCYTES # BLD AUTO: 0.93 10*3/MM3 (ref 0.1–0.9)
MONOCYTES NFR BLD AUTO: 9.2 % (ref 5–12)
NEUTROPHILS NFR BLD AUTO: 7.07 10*3/MM3 (ref 1.7–7)
NEUTROPHILS NFR BLD AUTO: 70.3 % (ref 42.7–76)
NRBC BLD AUTO-RTO: 0 /100 WBC (ref 0–0.2)
NT-PROBNP SERPL-MCNC: <36 PG/ML (ref 0–900)
PLATELET # BLD AUTO: 202 10*3/MM3 (ref 140–450)
PMV BLD AUTO: 10.5 FL (ref 6–12)
POTASSIUM SERPL-SCNC: 4.6 MMOL/L (ref 3.5–5.2)
PROT SERPL-MCNC: 8.1 G/DL (ref 6–8.5)
RBC # BLD AUTO: 4.84 10*6/MM3 (ref 4.14–5.8)
RHINOVIRUS RNA SPEC NAA+PROBE: NOT DETECTED
RSV RNA NPH QL NAA+NON-PROBE: NOT DETECTED
SARS-COV-2 RNA NPH QL NAA+NON-PROBE: NOT DETECTED
SODIUM SERPL-SCNC: 136 MMOL/L (ref 136–145)
TROPONIN T SERPL HS-MCNC: 13 NG/L
WBC NRBC COR # BLD AUTO: 10.06 10*3/MM3 (ref 3.4–10.8)

## 2025-03-13 PROCEDURE — 93005 ELECTROCARDIOGRAM TRACING: CPT | Performed by: EMERGENCY MEDICINE

## 2025-03-13 PROCEDURE — 83690 ASSAY OF LIPASE: CPT | Performed by: EMERGENCY MEDICINE

## 2025-03-13 PROCEDURE — 93010 ELECTROCARDIOGRAM REPORT: CPT | Performed by: STUDENT IN AN ORGANIZED HEALTH CARE EDUCATION/TRAINING PROGRAM

## 2025-03-13 PROCEDURE — 36415 COLL VENOUS BLD VENIPUNCTURE: CPT

## 2025-03-13 PROCEDURE — 84484 ASSAY OF TROPONIN QUANT: CPT | Performed by: EMERGENCY MEDICINE

## 2025-03-13 PROCEDURE — 71045 X-RAY EXAM CHEST 1 VIEW: CPT

## 2025-03-13 PROCEDURE — 83880 ASSAY OF NATRIURETIC PEPTIDE: CPT | Performed by: EMERGENCY MEDICINE

## 2025-03-13 PROCEDURE — 0202U NFCT DS 22 TRGT SARS-COV-2: CPT | Performed by: EMERGENCY MEDICINE

## 2025-03-13 PROCEDURE — 99285 EMERGENCY DEPT VISIT HI MDM: CPT

## 2025-03-13 PROCEDURE — 80053 COMPREHEN METABOLIC PANEL: CPT | Performed by: EMERGENCY MEDICINE

## 2025-03-13 PROCEDURE — 85025 COMPLETE CBC W/AUTO DIFF WBC: CPT | Performed by: EMERGENCY MEDICINE

## 2025-03-13 RX ORDER — SODIUM CHLORIDE 0.9 % (FLUSH) 0.9 %
10 SYRINGE (ML) INJECTION AS NEEDED
Status: DISCONTINUED | OUTPATIENT
Start: 2025-03-13 | End: 2025-03-15 | Stop reason: HOSPADM

## 2025-03-14 ENCOUNTER — APPOINTMENT (OUTPATIENT)
Dept: CT IMAGING | Facility: HOSPITAL | Age: 65
End: 2025-03-14
Payer: MEDICAID

## 2025-03-14 PROBLEM — R10.84 GENERALIZED ABDOMINAL PAIN: Status: ACTIVE | Noted: 2025-03-14

## 2025-03-14 PROBLEM — K59.01 SLOW TRANSIT CONSTIPATION: Status: ACTIVE | Noted: 2025-03-14

## 2025-03-14 LAB
ANION GAP SERPL CALCULATED.3IONS-SCNC: 11.8 MMOL/L (ref 5–15)
BASOPHILS # BLD AUTO: 0.04 10*3/MM3 (ref 0–0.2)
BASOPHILS NFR BLD AUTO: 0.5 % (ref 0–1.5)
BILIRUB UR QL STRIP: NEGATIVE
BUN SERPL-MCNC: 13 MG/DL (ref 8–23)
BUN/CREAT SERPL: 14.6 (ref 7–25)
CALCIUM SPEC-SCNC: 8.8 MG/DL (ref 8.6–10.5)
CHLORIDE SERPL-SCNC: 97 MMOL/L (ref 98–107)
CLARITY UR: CLEAR
CO2 SERPL-SCNC: 28.2 MMOL/L (ref 22–29)
COLOR UR: YELLOW
CREAT SERPL-MCNC: 0.89 MG/DL (ref 0.76–1.27)
DEPRECATED RDW RBC AUTO: 43.3 FL (ref 37–54)
EGFRCR SERPLBLD CKD-EPI 2021: 95.7 ML/MIN/1.73
EOSINOPHIL # BLD AUTO: 0.19 10*3/MM3 (ref 0–0.4)
EOSINOPHIL NFR BLD AUTO: 2.4 % (ref 0.3–6.2)
ERYTHROCYTE [DISTWIDTH] IN BLOOD BY AUTOMATED COUNT: 12.8 % (ref 12.3–15.4)
GEN 5 1HR TROPONIN T REFLEX: 11 NG/L
GLUCOSE BLDC GLUCOMTR-MCNC: 162 MG/DL (ref 70–130)
GLUCOSE BLDC GLUCOMTR-MCNC: 204 MG/DL (ref 70–130)
GLUCOSE SERPL-MCNC: 166 MG/DL (ref 65–99)
GLUCOSE UR STRIP-MCNC: ABNORMAL MG/DL
HBA1C MFR BLD: 7.4 % (ref 4.8–5.6)
HCT VFR BLD AUTO: 45.3 % (ref 37.5–51)
HGB BLD-MCNC: 14.8 G/DL (ref 13–17.7)
HGB UR QL STRIP.AUTO: NEGATIVE
IMM GRANULOCYTES # BLD AUTO: 0.03 10*3/MM3 (ref 0–0.05)
IMM GRANULOCYTES NFR BLD AUTO: 0.4 % (ref 0–0.5)
KETONES UR QL STRIP: NEGATIVE
LEUKOCYTE ESTERASE UR QL STRIP.AUTO: NEGATIVE
LYMPHOCYTES # BLD AUTO: 1.73 10*3/MM3 (ref 0.7–3.1)
LYMPHOCYTES NFR BLD AUTO: 22 % (ref 19.6–45.3)
MCH RBC QN AUTO: 30 PG (ref 26.6–33)
MCHC RBC AUTO-ENTMCNC: 32.7 G/DL (ref 31.5–35.7)
MCV RBC AUTO: 91.9 FL (ref 79–97)
MONOCYTES # BLD AUTO: 0.7 10*3/MM3 (ref 0.1–0.9)
MONOCYTES NFR BLD AUTO: 8.9 % (ref 5–12)
NEUTROPHILS NFR BLD AUTO: 5.19 10*3/MM3 (ref 1.7–7)
NEUTROPHILS NFR BLD AUTO: 65.8 % (ref 42.7–76)
NITRITE UR QL STRIP: NEGATIVE
NRBC BLD AUTO-RTO: 0 /100 WBC (ref 0–0.2)
PH UR STRIP.AUTO: 6.5 [PH] (ref 5–8)
PLATELET # BLD AUTO: 139 10*3/MM3 (ref 140–450)
PMV BLD AUTO: 12.2 FL (ref 6–12)
POTASSIUM SERPL-SCNC: 4.3 MMOL/L (ref 3.5–5.2)
PROT UR QL STRIP: NEGATIVE
QT INTERVAL: 394 MS
QT INTERVAL: 408 MS
QTC INTERVAL: 466 MS
QTC INTERVAL: 475 MS
RBC # BLD AUTO: 4.93 10*6/MM3 (ref 4.14–5.8)
SODIUM SERPL-SCNC: 137 MMOL/L (ref 136–145)
SP GR UR STRIP: >1.03 (ref 1–1.03)
TROPONIN T NUMERIC DELTA: -2 NG/L
TROPONIN T SERPL HS-MCNC: 13 NG/L
UROBILINOGEN UR QL STRIP: ABNORMAL
WBC NRBC COR # BLD AUTO: 7.88 10*3/MM3 (ref 3.4–10.8)

## 2025-03-14 PROCEDURE — 85025 COMPLETE CBC W/AUTO DIFF WBC: CPT | Performed by: INTERNAL MEDICINE

## 2025-03-14 PROCEDURE — G0378 HOSPITAL OBSERVATION PER HR: HCPCS

## 2025-03-14 PROCEDURE — 82948 REAGENT STRIP/BLOOD GLUCOSE: CPT

## 2025-03-14 PROCEDURE — 80048 BASIC METABOLIC PNL TOTAL CA: CPT | Performed by: INTERNAL MEDICINE

## 2025-03-14 PROCEDURE — 63710000001 INSULIN LISPRO (HUMAN) PER 5 UNITS: Performed by: INTERNAL MEDICINE

## 2025-03-14 PROCEDURE — 81003 URINALYSIS AUTO W/O SCOPE: CPT | Performed by: EMERGENCY MEDICINE

## 2025-03-14 PROCEDURE — 25810000003 SODIUM CHLORIDE 0.9 % SOLUTION: Performed by: NURSE PRACTITIONER

## 2025-03-14 PROCEDURE — 83036 HEMOGLOBIN GLYCOSYLATED A1C: CPT | Performed by: INTERNAL MEDICINE

## 2025-03-14 PROCEDURE — 74177 CT ABD & PELVIS W/CONTRAST: CPT

## 2025-03-14 PROCEDURE — 96376 TX/PRO/DX INJ SAME DRUG ADON: CPT

## 2025-03-14 PROCEDURE — 93005 ELECTROCARDIOGRAM TRACING: CPT | Performed by: NURSE PRACTITIONER

## 2025-03-14 PROCEDURE — 93010 ELECTROCARDIOGRAM REPORT: CPT | Performed by: STUDENT IN AN ORGANIZED HEALTH CARE EDUCATION/TRAINING PROGRAM

## 2025-03-14 PROCEDURE — 25510000001 IOPAMIDOL 61 % SOLUTION: Performed by: EMERGENCY MEDICINE

## 2025-03-14 PROCEDURE — 84484 ASSAY OF TROPONIN QUANT: CPT | Performed by: INTERNAL MEDICINE

## 2025-03-14 PROCEDURE — 96374 THER/PROPH/DIAG INJ IV PUSH: CPT

## 2025-03-14 PROCEDURE — 99204 OFFICE O/P NEW MOD 45 MIN: CPT | Performed by: INTERNAL MEDICINE

## 2025-03-14 RX ORDER — CLONIDINE HYDROCHLORIDE 0.1 MG/1
0.1 TABLET ORAL EVERY 12 HOURS SCHEDULED
Status: DISCONTINUED | OUTPATIENT
Start: 2025-03-14 | End: 2025-03-15 | Stop reason: HOSPADM

## 2025-03-14 RX ORDER — BISACODYL 10 MG
10 SUPPOSITORY, RECTAL RECTAL DAILY PRN
Status: DISCONTINUED | OUTPATIENT
Start: 2025-03-14 | End: 2025-03-15 | Stop reason: HOSPADM

## 2025-03-14 RX ORDER — POLYETHYLENE GLYCOL 3350 17 G/17G
17 POWDER, FOR SOLUTION ORAL DAILY PRN
Status: DISCONTINUED | OUTPATIENT
Start: 2025-03-14 | End: 2025-03-14

## 2025-03-14 RX ORDER — SODIUM CHLORIDE 0.9 % (FLUSH) 0.9 %
10 SYRINGE (ML) INJECTION EVERY 12 HOURS SCHEDULED
Status: DISCONTINUED | OUTPATIENT
Start: 2025-03-14 | End: 2025-03-15 | Stop reason: HOSPADM

## 2025-03-14 RX ORDER — FAMOTIDINE 10 MG/ML
20 INJECTION, SOLUTION INTRAVENOUS EVERY 12 HOURS SCHEDULED
Status: DISCONTINUED | OUTPATIENT
Start: 2025-03-14 | End: 2025-03-15 | Stop reason: HOSPADM

## 2025-03-14 RX ORDER — ONDANSETRON 2 MG/ML
4 INJECTION INTRAMUSCULAR; INTRAVENOUS EVERY 6 HOURS PRN
Status: DISCONTINUED | OUTPATIENT
Start: 2025-03-14 | End: 2025-03-15 | Stop reason: HOSPADM

## 2025-03-14 RX ORDER — SODIUM CHLORIDE 0.9 % (FLUSH) 0.9 %
10 SYRINGE (ML) INJECTION AS NEEDED
Status: DISCONTINUED | OUTPATIENT
Start: 2025-03-14 | End: 2025-03-15 | Stop reason: HOSPADM

## 2025-03-14 RX ORDER — BISACODYL 5 MG/1
5 TABLET, DELAYED RELEASE ORAL DAILY PRN
Status: DISCONTINUED | OUTPATIENT
Start: 2025-03-14 | End: 2025-03-14

## 2025-03-14 RX ORDER — ONDANSETRON 4 MG/1
4 TABLET, ORALLY DISINTEGRATING ORAL EVERY 6 HOURS PRN
Status: DISCONTINUED | OUTPATIENT
Start: 2025-03-14 | End: 2025-03-15 | Stop reason: HOSPADM

## 2025-03-14 RX ORDER — NICOTINE POLACRILEX 4 MG
15 LOZENGE BUCCAL
Status: DISCONTINUED | OUTPATIENT
Start: 2025-03-14 | End: 2025-03-15 | Stop reason: HOSPADM

## 2025-03-14 RX ORDER — POLYETHYLENE GLYCOL 3350 17 G/17G
17 POWDER, FOR SOLUTION ORAL 2 TIMES DAILY
Status: DISCONTINUED | OUTPATIENT
Start: 2025-03-14 | End: 2025-03-15 | Stop reason: HOSPADM

## 2025-03-14 RX ORDER — CARVEDILOL 3.12 MG/1
3.12 TABLET ORAL 2 TIMES DAILY WITH MEALS
Status: DISCONTINUED | OUTPATIENT
Start: 2025-03-14 | End: 2025-03-15 | Stop reason: HOSPADM

## 2025-03-14 RX ORDER — AMOXICILLIN 250 MG
2 CAPSULE ORAL 2 TIMES DAILY PRN
Status: DISCONTINUED | OUTPATIENT
Start: 2025-03-14 | End: 2025-03-14

## 2025-03-14 RX ORDER — IOPAMIDOL 612 MG/ML
100 INJECTION, SOLUTION INTRAVASCULAR
Status: COMPLETED | OUTPATIENT
Start: 2025-03-14 | End: 2025-03-14

## 2025-03-14 RX ORDER — CALCIUM CARBONATE 500 MG/1
2 TABLET, CHEWABLE ORAL 2 TIMES DAILY PRN
Status: DISCONTINUED | OUTPATIENT
Start: 2025-03-14 | End: 2025-03-15 | Stop reason: HOSPADM

## 2025-03-14 RX ORDER — BISACODYL 10 MG
10 SUPPOSITORY, RECTAL RECTAL DAILY PRN
Status: DISCONTINUED | OUTPATIENT
Start: 2025-03-14 | End: 2025-03-14

## 2025-03-14 RX ORDER — LORAZEPAM 2 MG/ML
1 INJECTION INTRAMUSCULAR ONCE
Status: COMPLETED | OUTPATIENT
Start: 2025-03-14 | End: 2025-03-15

## 2025-03-14 RX ORDER — SODIUM CHLORIDE 9 MG/ML
40 INJECTION, SOLUTION INTRAVENOUS AS NEEDED
Status: DISCONTINUED | OUTPATIENT
Start: 2025-03-14 | End: 2025-03-15 | Stop reason: HOSPADM

## 2025-03-14 RX ORDER — INSULIN LISPRO 100 [IU]/ML
2-9 INJECTION, SOLUTION INTRAVENOUS; SUBCUTANEOUS
Status: DISCONTINUED | OUTPATIENT
Start: 2025-03-14 | End: 2025-03-15 | Stop reason: HOSPADM

## 2025-03-14 RX ORDER — DEXTROSE MONOHYDRATE 25 G/50ML
25 INJECTION, SOLUTION INTRAVENOUS
Status: DISCONTINUED | OUTPATIENT
Start: 2025-03-14 | End: 2025-03-15 | Stop reason: HOSPADM

## 2025-03-14 RX ORDER — SODIUM CHLORIDE 9 MG/ML
100 INJECTION, SOLUTION INTRAVENOUS CONTINUOUS
Status: ACTIVE | OUTPATIENT
Start: 2025-03-14 | End: 2025-03-15

## 2025-03-14 RX ORDER — MORPHINE SULFATE 2 MG/ML
2 INJECTION, SOLUTION INTRAMUSCULAR; INTRAVENOUS
Status: DISCONTINUED | OUTPATIENT
Start: 2025-03-14 | End: 2025-03-15 | Stop reason: HOSPADM

## 2025-03-14 RX ORDER — AMOXICILLIN 250 MG
2 CAPSULE ORAL 2 TIMES DAILY
Status: DISCONTINUED | OUTPATIENT
Start: 2025-03-14 | End: 2025-03-15 | Stop reason: HOSPADM

## 2025-03-14 RX ORDER — BISACODYL 5 MG/1
5 TABLET, DELAYED RELEASE ORAL DAILY PRN
Status: DISCONTINUED | OUTPATIENT
Start: 2025-03-14 | End: 2025-03-15 | Stop reason: HOSPADM

## 2025-03-14 RX ORDER — IBUPROFEN 600 MG/1
1 TABLET ORAL
Status: DISCONTINUED | OUTPATIENT
Start: 2025-03-14 | End: 2025-03-15 | Stop reason: HOSPADM

## 2025-03-14 RX ADMIN — SENNOSIDES AND DOCUSATE SODIUM 2 TABLET: 50; 8.6 TABLET ORAL at 23:04

## 2025-03-14 RX ADMIN — SODIUM CHLORIDE 100 ML/HR: 0.9 INJECTION, SOLUTION INTRAVENOUS at 04:28

## 2025-03-14 RX ADMIN — CLONIDINE HYDROCHLORIDE 0.1 MG: 0.1 TABLET ORAL at 12:51

## 2025-03-14 RX ADMIN — FAMOTIDINE 20 MG: 10 INJECTION INTRAVENOUS at 09:25

## 2025-03-14 RX ADMIN — SENNOSIDES AND DOCUSATE SODIUM 2 TABLET: 50; 8.6 TABLET ORAL at 12:51

## 2025-03-14 RX ADMIN — FAMOTIDINE 20 MG: 10 INJECTION INTRAVENOUS at 23:03

## 2025-03-14 RX ADMIN — CLONIDINE HYDROCHLORIDE 0.1 MG: 0.1 TABLET ORAL at 23:02

## 2025-03-14 RX ADMIN — Medication 10 ML: at 23:04

## 2025-03-14 RX ADMIN — Medication 10 ML: at 09:25

## 2025-03-14 RX ADMIN — IOPAMIDOL 85 ML: 612 INJECTION, SOLUTION INTRAVENOUS at 00:17

## 2025-03-14 RX ADMIN — POLYETHYLENE GLYCOL 3350 17 G: 17 POWDER, FOR SOLUTION ORAL at 23:03

## 2025-03-14 RX ADMIN — CARVEDILOL 3.12 MG: 3.12 TABLET, FILM COATED ORAL at 12:50

## 2025-03-14 RX ADMIN — INSULIN LISPRO 4 UNITS: 100 INJECTION, SOLUTION INTRAVENOUS; SUBCUTANEOUS at 23:03

## 2025-03-14 NOTE — ED NOTES
Nursing report ED to floor  Kwame Andres  64 y.o.  male    HPI :  HPI  Stated Reason for Visit: Patient from home via PV reporting midsternal chest pressure with nausea, vomiting, and diaphoresis and numbness to left arm, states the numbness started 4-5 hours ago. Patient states his heart rate has been getting up into the 120s, he states he has had pneumonia for a week, but states he has not been seen anywhere or received a diagnosis or treatment for pneumonia. Patient took a 325 mg aspirin today.    Chief Complaint  Chief Complaint   Patient presents with    Chest Pain    Numbness       Admitting doctor:   Jessica Ritter MD    Admitting diagnosis:   The primary encounter diagnosis was Generalized abdominal pain. Diagnoses of Constipation, unspecified constipation type, Chest pain, unspecified type, and Abnormal CT of the abdomen were also pertinent to this visit.    Code status:   Current Code Status       Date Active Code Status Order ID Comments User Context       3/14/2025 0259 CPR (Attempt to Resuscitate) [id number]  Pauline New APRN ED        Question Answer    Code Status (Patient has no pulse and is not breathing) CPR (Attempt to Resuscitate)    Medical Interventions (Patient has pulse or is breathing) Full Support                    Allergies:   Shrimp    Isolation:   No active isolations    Intake and Output  No intake or output data in the 24 hours ending 03/14/25 0301    Weight:   There were no vitals filed for this visit.    Most recent vitals:   Vitals:    03/14/25 0031 03/14/25 0033 03/14/25 0101 03/14/25 0131   BP: 111/79  106/92 115/86   Pulse: 80  83 80   Resp:       Temp:       SpO2:  94% 93% 96%       Active LDAs/IV Access:   Lines, Drains & Airways       Active LDAs       Name Placement date Placement time Site Days    Peripheral IV 03/13/25 2238 Anterior;Proximal;Right Forearm 03/13/25 2238  Forearm  less than 1                    Labs (abnormal labs have a star):   Labs Reviewed    COMPREHENSIVE METABOLIC PANEL - Abnormal; Notable for the following components:       Result Value    Glucose 173 (*)     Chloride 95 (*)     CO2 30.4 (*)     All other components within normal limits    Narrative:     GFR Categories in Chronic Kidney Disease (CKD)      GFR Category          GFR (mL/min/1.73)    Interpretation  G1                     90 or greater         Normal or high (1)  G2                      60-89                Mild decrease (1)  G3a                   45-59                Mild to moderate decrease  G3b                   30-44                Moderate to severe decrease  G4                    15-29                Severe decrease  G5                    14 or less           Kidney failure          (1)In the absence of evidence of kidney disease, neither GFR category G1 or G2 fulfill the criteria for CKD.    eGFR calculation 2021 CKD-EPI creatinine equation, which does not include race as a factor   CBC WITH AUTO DIFFERENTIAL - Abnormal; Notable for the following components:    Lymphocyte % 18.0 (*)     Neutrophils, Absolute 7.07 (*)     Monocytes, Absolute 0.93 (*)     All other components within normal limits   RESPIRATORY PANEL PCR W/ COVID-19 (SARS-COV-2), NP SWAB IN UTM/VTP, 2 HR TAT - Normal    Narrative:     In the setting of a positive respiratory panel with a viral infection PLUS a negative procalcitonin without other underlying concern for bacterial infection, consider observing off antibiotics or discontinuation of antibiotics and continue supportive care. If the respiratory panel is positive for atypical bacterial infection (Bordetella pertussis, Chlamydophila pneumoniae, or Mycoplasma pneumoniae), consider antibiotic de-escalation to target atypical bacterial infection.   LIPASE - Normal   TROPONIN - Normal    Narrative:     High Sensitive Troponin T Reference Range:  <14.0 ng/L- Negative Female for AMI  <22.0 ng/L- Negative Male for AMI  >=14 - Abnormal Female indicating possible  myocardial injury.  >=22 - Abnormal Male indicating possible myocardial injury.   Clinicians would have to utilize clinical acumen, EKG, Troponin, and serial changes to determine if it is an Acute Myocardial Infarction or myocardial injury due to an underlying chronic condition.        BNP (IN-HOUSE) - Normal    Narrative:     This assay is used as an aid in the diagnosis of individuals suspected of having heart failure. It can be used as an aid in the diagnosis of acute decompensated heart failure (ADHF) in patients presenting with signs and symptoms of ADHF to the emergency department (ED). In addition, NT-proBNP of <300 pg/mL indicates ADHF is not likely.    Age Range Result Interpretation  NT-proBNP Concentration (pg/mL:      <50             Positive            >450                   Gray                 300-450                    Negative             <300    50-75           Positive            >900                  Gray                300-900                  Negative            <300      >75             Positive            >1800                  Gray                300-1800                  Negative            <300   HIGH SENSITIVITIY TROPONIN T 1HR - Normal    Narrative:     High Sensitive Troponin T Reference Range:  <14.0 ng/L- Negative Female for AMI  <22.0 ng/L- Negative Male for AMI  >=14 - Abnormal Female indicating possible myocardial injury.  >=22 - Abnormal Male indicating possible myocardial injury.   Clinicians would have to utilize clinical acumen, EKG, Troponin, and serial changes to determine if it is an Acute Myocardial Infarction or myocardial injury due to an underlying chronic condition.        URINALYSIS W/ MICROSCOPIC IF INDICATED (NO CULTURE)   BASIC METABOLIC PANEL   CBC WITH AUTO DIFFERENTIAL   TROPONIN   CBC AND DIFFERENTIAL    Narrative:     The following orders were created for panel order CBC & Differential.  Procedure                               Abnormality         Status                      ---------                               -----------         ------                     CBC Auto Differential[[id number]]        Abnormal            Final result                 Please view results for these tests on the individual orders.       EKG:   ECG 12 Lead Chest Pain   Preliminary Result   HEART RATE=87  bpm   RR Gwtylcdy=772  ms   SC Mixylkfm=359  ms   P Horizontal Axis=6  deg   P Front Axis=73  deg   QRSD Interval=99  ms   QT Dujsptbs=148  ms   EYmF=087  ms   QRS Axis=83  deg   T Wave Axis=18  deg   - ABNORMAL ECG -   Sinus rhythm   Prolonged SC interval   Borderline right axis deviation   Low voltage, precordial leads   Abnormal R-wave progression, early transition   Borderline T abnormalities, anterior leads   Date and Time of Study:2025-03-13 22:41:16      ECG 12 Lead Chest Pain    (Results Pending)       Meds given in ED:   Medications   sodium chloride 0.9 % flush 10 mL (has no administration in time range)   sodium chloride 0.9 % flush 10 mL (has no administration in time range)   sodium chloride 0.9 % flush 10 mL (has no administration in time range)   sodium chloride 0.9 % infusion 40 mL (has no administration in time range)   sodium chloride 0.9 % infusion (has no administration in time range)   sennosides-docusate (PERICOLACE) 8.6-50 MG per tablet 2 tablet (has no administration in time range)     And   polyethylene glycol (MIRALAX) packet 17 g (has no administration in time range)     And   bisacodyl (DULCOLAX) EC tablet 5 mg (has no administration in time range)     And   bisacodyl (DULCOLAX) suppository 10 mg (has no administration in time range)   ondansetron ODT (ZOFRAN-ODT) disintegrating tablet 4 mg (has no administration in time range)     Or   ondansetron (ZOFRAN) injection 4 mg (has no administration in time range)   calcium carbonate (TUMS) chewable tablet 500 mg (200 mg elemental) (has no administration in time range)   morphine injection 2 mg (has no administration in time  range)   famotidine (PEPCID) injection 20 mg (has no administration in time range)   iopamidol (ISOVUE-300) 61 % injection 100 mL (85 mL Intravenous Given 3/14/25 0017)       Imaging results:  CT Abdomen Pelvis With Contrast  Result Date: 3/14/2025   1. The patient does have central intrahepatic and mild extrahepatic biliary dilatation. There is also some pancreatic atrophy, along with some mild dilatation of the distal pancreatic duct. I don't see an obvious obstructing lesion, but further assessment with MRCP is recommended. 2. Increased stool burden throughout the colon may reflect constipation.  Radiation dose reduction techniques were utilized, including automated exposure control and exposure modulation based on body size.   This report was finalized on 3/14/2025 1:04 AM by Dr. Janneth Corral M.D on Workstation: [email or web address]      XR Chest 1 View  Result Date: 3/13/2025  Patient appears to have areas of scarring within both lungs. No definite acute infiltrates seen.  This report was finalized on 3/13/2025 11:48 PM by Dr. Janneth Corral M.D on Workstation: [email or web address]        Ambulatory status:   - asst 1      Social issues:   Social History     Socioeconomic History    Marital status: Single   Tobacco Use    Smoking status: Never   Vaping Use    Vaping status: Never Used   Substance and Sexual Activity    Alcohol use: No    Drug use: No    Sexual activity: Defer       Peripheral Neurovascular  Peripheral Neurovascular (Adult)  Peripheral Neurovascular WDL: WDL    Neuro Cognitive  Neuro Cognitive (Adult)  Cognitive/Neuro/Behavioral WDL: WDL    Learning  Learning Assessment  Learning Readiness and Ability: no barriers identified  Education Provided  Person Taught: patient  Teaching Method: verbal instruction  Teaching Focus: symptom/problem overview  Education Outcome Evaluation: eager to learn    Respiratory  Respiratory WDL  Respiratory WDL: WDL    Abdominal Pain       Pain Assessments  Pain  (Adult)  (0-10) Pain Rating: Rest: 5  Pain Location: chest    NIH Stroke Scale       Jie Yañez RN  03/14/25 03:01 EDT

## 2025-03-14 NOTE — CASE MANAGEMENT/SOCIAL WORK
Continued Stay Note  Psychiatric     Patient Name: Kwame Andres  MRN: [id number]  Today's Date: 3/14/2025    Admit Date: 3/13/2025    Plan: Return home with assist of his sister/ caregiver.   Discharge Plan       Row Name 03/14/25 1511       Plan    Plan Comments If pt qualifies for home O2,  will need qualifying sat and orders upon DC.  Pt has used [facility] for home O2 in the past, but they cannot accept him at this time.  Pt notified and gave [facility] permission to find a DME company.  Referral sent to / [facility] - they can accept.    Notify [facility] upon DC if home O2 is needed and they will contact [facility] for delivery of portable. Please add a comment in the O2 order for a 'portable oxygen concentrator' per pt request.............Agnes VALENZUELA/ [facility]                   Discharge Codes    No documentation.                       Agnes Dumont RN

## 2025-03-14 NOTE — H&P
Patient Name:  Kwame Andres  YOB: 1960  MRN:  [id number]  Admit Date:  3/13/2025  Patient Care Team:  Provider, No Known as PCP - General  Provider, No Known as PCP - Family Medicine      Subjective   History Present Illness     Chief Complaint   Patient presents with    Chest Pain    Numbness       Mr. Andres is a 64 y.o. with a history of DM, HTN, chronic pain (arms and legs and back sounds like related to previous football) that presents to [facility] complaining of a few symptoms. He's had a couple of weeks of cough. Worried he had PNA so he had taken a course of what sounds like augmentin which he had at home.    He has chronic pain and on chronic pain meds. He has issues with constipation at times. He presented with constipation as well as N/V. No fever but some chills. Workup with CT A/P shows constipation and intra and extrahepatic biliary dilation.     Additionally had some heart palpations and elevated HR with numbness ro left arm. Denies real chest pain per se. No numbness/weakness in leg/stroke like symptoms.       History of Present Illness  Review of Systems   Constitutional:  Positive for activity change and chills. Negative for fever.   HENT: Negative.     Eyes: Negative.    Respiratory:  Positive for cough and shortness of breath.    Cardiovascular:  Positive for palpitations and leg swelling (chronically but better now). Negative for chest pain.   Gastrointestinal:  Positive for constipation, nausea and vomiting.   Endocrine: Negative.    Genitourinary: Negative.    Musculoskeletal:  Positive for arthralgias and back pain.   Skin: Negative.    Allergic/Immunologic: Negative.    Neurological: Negative.    Hematological: Negative.    Psychiatric/Behavioral: Negative.          Personal History     Past Medical History:   Diagnosis Date    Arthritis     Chronic pain     Diabetes mellitus     Herniated thoracic disc without myelopathy     by right scapula     "Hypertension     Narcotic dependence     Pneumonia     PTSD (post-traumatic stress disorder)     Torn Achilles tendon      Past Surgical History:   Procedure Laterality Date    HIP SURGERY Right      Family History   Problem Relation Age of Onset    Cancer Mother     Hypertension Mother      Social History     Tobacco Use    Smoking status: Never   Vaping Use    Vaping status: Never Used   Substance Use Topics    Alcohol use: No    Drug use: No     Medications Prior to Admission   Medication Sig Dispense Refill Last Dose/Taking    carvedilol (COREG) 3.125 MG tablet Take 1 tablet by mouth 2 (Two) Times a Day With Meals. 60 tablet 0 9/1/2024    cloNIDine (CATAPRES) 0.1 MG tablet Take 1 tablet by mouth 3 (three) times a day. 21 tablet 0 Taking    insulin detemir (LEVEMIR) 100 UNIT/ML injection Inject 20 Units under the skin daily. in am  12 9/1/2024    metFORMIN (GLUCOPHAGE) 1000 MG tablet Take 1 tablet by mouth 2 (Two) Times a Day With Meals. 60 tablet 0 9/1/2024    bacitracin 500 UNIT/GM ointment Apply 1 application topically to the appropriate area as directed 2 (Two) Times a Day. 14 g 0     clonazePAM (KlonoPIN) 0.5 MG tablet Take 1 tablet by mouth 2 (Two) Times a Day As Needed.       DULoxetine (CYMBALTA) 30 MG capsule Take 1 capsule by mouth daily. 7 capsule 0     furosemide (LASIX) 40 MG tablet Take 1 tablet by mouth Daily. 30 tablet 0     hydrOXYzine (ATARAX) 50 MG tablet Take 1 tablet by mouth 3 (three) times a day as needed for itching. 21 tablet 0     Lidocaine Viscous HCl (XYLOCAINE) 2 % solution Take 10 mL by mouth As Needed for Mild Pain  or Moderate Pain . 20 mL 0     methadone (DOLOPHINE) 10 MG tablet Take 2 tablets by mouth 2 (Two) Times a Day,       pravastatin (PRAVACHOL) 20 MG tablet Take 2 tablets by mouth daily.       Zinc Sulfate 220 (50 Zn) MG tablet Take 220 mg by mouth.        Allergies:    Allergies   Allergen Reactions    Shrimp Other (See Comments)     \"break out in a sweat\", recent " reaction       Objective    Objective     Vital Signs  Temp:  [98.1 °F (36.7 °C)-98.2 °F (36.8 °C)] 98.2 °F (36.8 °C)  Heart Rate:  [75-95] 84  Resp:  [17-18] 18  BP: (106-133)/(45-92) 127/84  SpO2:  [90 %-99 %] 99 %  on  Flow (L/min) (Oxygen Therapy):  [2] 2;   Device (Oxygen Therapy): room air  Body mass index is 36.84 kg/m².    Physical Exam  Vitals and nursing note reviewed.   Constitutional:       General: He is not in acute distress.     Appearance: He is well-developed. He is not diaphoretic.   HENT:      Head: Normocephalic and atraumatic.   Eyes:      General: No scleral icterus.     Conjunctiva/sclera: Conjunctivae normal.   Neck:      Vascular: No JVD.   Cardiovascular:      Rate and Rhythm: Normal rate and regular rhythm.      Heart sounds: Normal heart sounds. No murmur heard.  Pulmonary:      Effort: Pulmonary effort is normal. No respiratory distress.      Breath sounds: Normal breath sounds.   Abdominal:      General: There is distension.      Palpations: Abdomen is soft.      Tenderness: There is abdominal tenderness (mild generalized). There is no guarding.   Musculoskeletal:      Cervical back: Normal range of motion and neck supple.   Skin:     General: Skin is warm and dry.   Neurological:      Mental Status: He is alert and oriented to person, place, and time.      Cranial Nerves: No cranial nerve deficit.      Motor: No abnormal muscle tone.   Psychiatric:         Behavior: Behavior normal.         Thought Content: Thought content normal.     Decreased ROM of both of his shoulders    Results Review:  I reviewed the patient's new clinical results.      Lab Results (last 24 hours)       Procedure Component Value Units Date/Time    CBC & Differential [[id number]]  (Abnormal) Collected: 03/13/25 2238    Specimen: Blood Updated: 03/13/25 2251    Narrative:      The following orders were created for panel order CBC & Differential.  Procedure                               Abnormality         Status                      ---------                               -----------         ------                     CBC Auto Differential[[id number]]        Abnormal            Final result                 Please view results for these tests on the individual orders.    Comprehensive Metabolic Panel [[id number]]  (Abnormal) Collected: 03/13/25 2238    Specimen: Blood Updated: 03/13/25 2317     Glucose 173 mg/dL      BUN 18 mg/dL      Creatinine 1.17 mg/dL      Sodium 136 mmol/L      Potassium 4.6 mmol/L      Chloride 95 mmol/L      CO2 30.4 mmol/L      Calcium 9.4 mg/dL      Total Protein 8.1 g/dL      Albumin 3.9 g/dL      ALT (SGPT) 38 U/L      AST (SGOT) 24 U/L      Alkaline Phosphatase 87 U/L      Total Bilirubin 0.3 mg/dL      Globulin 4.2 gm/dL      A/G Ratio 0.9 g/dL      BUN/Creatinine Ratio 15.4     Anion Gap 10.6 mmol/L      eGFR 69.6 mL/min/1.73     Narrative:      GFR Categories in Chronic Kidney Disease (CKD)      GFR Category          GFR (mL/min/1.73)    Interpretation  G1                     90 or greater         Normal or high (1)  G2                      60-89                Mild decrease (1)  G3a                   45-59                Mild to moderate decrease  G3b                   30-44                Moderate to severe decrease  G4                    15-29                Severe decrease  G5                    14 or less           Kidney failure          (1)In the absence of evidence of kidney disease, neither GFR category G1 or G2 fulfill the criteria for CKD.    eGFR calculation 2021 CKD-EPI creatinine equation, which does not include race as a factor    Lipase [[id number]]  (Normal) Collected: 03/13/25 2238    Specimen: Blood Updated: 03/13/25 2317     Lipase 15 U/L     CBC Auto Differential [[id number]]  (Abnormal) Collected: 03/13/25 2238    Specimen: Blood Updated: 03/13/25 2251     WBC 10.06 10*3/mm3      RBC 4.84 10*6/mm3      Hemoglobin 14.7 g/dL      Hematocrit 43.7 %      MCV 90.3 fL      MCH 30.4 pg       MCHC 33.6 g/dL      RDW 12.3 %      RDW-SD 40.5 fl      MPV 10.5 fL      Platelets 202 10*3/mm3      Neutrophil % 70.3 %      Lymphocyte % 18.0 %      Monocyte % 9.2 %      Eosinophil % 1.7 %      Basophil % 0.4 %      Immature Grans % 0.4 %      Neutrophils, Absolute 7.07 10*3/mm3      Lymphocytes, Absolute 1.81 10*3/mm3      Monocytes, Absolute 0.93 10*3/mm3      Eosinophils, Absolute 0.17 10*3/mm3      Basophils, Absolute 0.04 10*3/mm3      Immature Grans, Absolute 0.04 10*3/mm3      nRBC 0.0 /100 WBC     High Sensitivity Troponin T [[id number]]  (Normal) Collected: 03/13/25 2238    Specimen: Blood Updated: 03/13/25 2317     HS Troponin T 13 ng/L     Narrative:      High Sensitive Troponin T Reference Range:  <14.0 ng/L- Negative Female for AMI  <22.0 ng/L- Negative Male for AMI  >=14 - Abnormal Female indicating possible myocardial injury.  >=22 - Abnormal Male indicating possible myocardial injury.   Clinicians would have to utilize clinical acumen, EKG, Troponin, and serial changes to determine if it is an Acute Myocardial Infarction or myocardial injury due to an underlying chronic condition.         BNP [[id number]]  (Normal) Collected: 03/13/25 2238    Specimen: Blood Updated: 03/13/25 2317     proBNP <36.0 pg/mL     Narrative:      This assay is used as an aid in the diagnosis of individuals suspected of having heart failure. It can be used as an aid in the diagnosis of acute decompensated heart failure (ADHF) in patients presenting with signs and symptoms of ADHF to the emergency department (ED). In addition, NT-proBNP of <300 pg/mL indicates ADHF is not likely.    Age Range Result Interpretation  NT-proBNP Concentration (pg/mL:      <50             Positive            >450                   Gray                 300-450                    Negative             <300    50-75           Positive            >900                  Gray                300-900                  Negative            <300      >75              Positive            >1800                  Gray                300-1800                  Negative            <300    Respiratory Panel PCR w/COVID-19(SARS-CoV-2) LINDY/PORTER/JUAN/PAD/COR/SHAQUILLE In-House, NP Swab in UTM/VTM, 2 HR TAT - Swab, Nasopharynx [[id number]]  (Normal) Collected: 03/13/25 2244    Specimen: Swab from Nasopharynx Updated: 03/13/25 2352     ADENOVIRUS, PCR Not Detected     Coronavirus 229E Not Detected     Coronavirus HKU1 Not Detected     Coronavirus NL63 Not Detected     Coronavirus OC43 Not Detected     COVID19 Not Detected     Human Metapneumovirus Not Detected     Human Rhinovirus/Enterovirus Not Detected     Influenza A PCR Not Detected     Influenza B PCR Not Detected     Parainfluenza Virus 1 Not Detected     Parainfluenza Virus 2 Not Detected     Parainfluenza Virus 3 Not Detected     Parainfluenza Virus 4 Not Detected     RSV, PCR Not Detected     Bordetella pertussis pcr Not Detected     Bordetella parapertussis PCR Not Detected     Chlamydophila pneumoniae PCR Not Detected     Mycoplasma pneumo by PCR Not Detected    Narrative:      In the setting of a positive respiratory panel with a viral infection PLUS a negative procalcitonin without other underlying concern for bacterial infection, consider observing off antibiotics or discontinuation of antibiotics and continue supportive care. If the respiratory panel is positive for atypical bacterial infection (Bordetella pertussis, Chlamydophila pneumoniae, or Mycoplasma pneumoniae), consider antibiotic de-escalation to target atypical bacterial infection.    High Sensitivity Troponin T 1Hr [[id number]]  (Normal) Collected: 03/13/25 2340    Specimen: Blood from Arm, Right Updated: 03/14/25 0013     HS Troponin T 11 ng/L      Troponin T Numeric Delta -2 ng/L     Narrative:      High Sensitive Troponin T Reference Range:  <14.0 ng/L- Negative Female for AMI  <22.0 ng/L- Negative Male for AMI  >=14 - Abnormal Female indicating possible  myocardial injury.  >=22 - Abnormal Male indicating possible myocardial injury.   Clinicians would have to utilize clinical acumen, EKG, Troponin, and serial changes to determine if it is an Acute Myocardial Infarction or myocardial injury due to an underlying chronic condition.         Urinalysis With Microscopic If Indicated (No Culture) - Urine, Clean Catch [[id number]]  (Abnormal) Collected: 03/14/25 0716    Specimen: Urine, Clean Catch Updated: 03/14/25 0750     Color, UA Yellow     Appearance, UA Clear     pH, UA 6.5     Specific Gravity, UA >1.030     Glucose,  mg/dL (Trace)     Ketones, UA Negative     Bilirubin, UA Negative     Blood, UA Negative     Protein, UA Negative     Leuk Esterase, UA Negative     Nitrite, UA Negative     Urobilinogen, UA 1.0 E.U./dL    Narrative:      Urine microscopic not indicated.            Imaging Results (Last 24 Hours)       Procedure Component Value Units Date/Time    CT Abdomen Pelvis With Contrast [[id number]] Collected: 03/14/25 0058     Updated: 03/14/25 0107    Narrative:      CT OF THE ABDOMEN AND PELVIS WITH CONTRAST     HISTORY: Abdominal pain     COMPARISON: March 4, 2024     TECHNIQUE: Axial CT imaging was obtained through the abdomen and pelvis.  IV contrast was administered.     FINDINGS:  Images through the lung bases demonstrate chronic fibrotic changes. No  suspicious hepatic lesions are seen. The stomach, duodenum, adrenal  glands and spleen appear normal. There is some mild intra and  extrahepatic biliary dilatation. Common bile duct measures up to 1.1 cm.  There is some pancreatic atrophy. This is more notable within the head  and neck. There is also some mild dilatation of the pancreatic duct,  measuring up to 5 mm. Further assessment with nonemergent MRCP is  suggested. Gallbladder, spleen, and adrenal glands are all normal. The  kidneys enhance symmetrically. There is no hydronephrosis. There is a  simple appearing right renal cyst. Prostate gland  and urinary bladder  appear normal. Increased stool burden is noted throughout the colon,  suggesting constipation. There is no evidence of small bowel  obstruction. The appendix is normal. There are aortoiliac  calcifications. There are bilateral fat-containing greater than right.  No acute osseous abnormalities are seen.       Impression:         1. The patient does have central intrahepatic and mild extrahepatic  biliary dilatation. There is also some pancreatic atrophy, along with  some mild dilatation of the distal pancreatic duct. I don't see an  obvious obstructing lesion, but further assessment with MRCP is  recommended.  2. Increased stool burden throughout the colon may reflect constipation.     Radiation dose reduction techniques were utilized, including automated  exposure control and exposure modulation based on body size.        This report was finalized on 3/14/2025 1:04 AM by Dr. Janneth Corral M.D on Workstation: [email or web address]       XR Chest 1 View [[id number]] Collected: 03/13/25 2346     Updated: 03/13/25 2351    Narrative:      SINGLE VIEW OF THE CHEST     HISTORY: Chest pain     COMPARISON: March 4, 2024     FINDINGS:  There is cardiomegaly. There appears to be some vascular congestion. The  patient has areas of scarring within both lungs. No pneumothorax or  pleural effusion is seen. No definite acute infiltrates are identified.       Impression:      Patient appears to have areas of scarring within both lungs. No definite  acute infiltrates seen.     This report was finalized on 3/13/2025 11:48 PM by Dr. Janneth Corral M.D on Workstation: [id number]                   ECG 12 Lead Chest Pain   Preliminary Result   HEART RATE=78  bpm   RR Tqcnwixs=813  ms   FL Fnukvqaf=396  ms   P Horizontal Axis=8  deg   P Front Axis=61  deg   QRSD Zhexrhvp=271  ms   QT Hnjznrqu=079  ms   ULgJ=236  ms   QRS Axis=41  deg   T Wave Axis=23  deg   - ABNORMAL ECG -   Sinus rhythm   Prolonged FL interval    Abnormal R-wave progression, early transition   Date and Time of Study:2025-03-14 05:19:52      ECG 12 Lead Chest Pain   Preliminary Result   HEART RATE=87  bpm   RR Oopajwpj=513  ms   DC Bkvtxfmg=095  ms   P Horizontal Axis=6  deg   P Front Axis=73  deg   QRSD Interval=99  ms   QT Dxsiuqlz=998  ms   ORvB=529  ms   QRS Axis=83  deg   T Wave Axis=18  deg   - ABNORMAL ECG -   Sinus rhythm   Prolonged DC interval   Borderline right axis deviation   Low voltage, precordial leads   Abnormal R-wave progression, early transition   Borderline T abnormalities, anterior leads   Date and Time of Study:2025-03-13 22:41:16      Telemetry Scan   Final Result           Assessment/Plan     Active Hospital Problems    Diagnosis  POA    **Generalized abdominal pain [R10.84]  Yes    Slow transit constipation [K59.01]  Yes    Methadone dependence [F11.20]  Yes    Chronic pain [G89.29]  Yes    Type 2 diabetes mellitus, without long-term current use of insulin [E11.9]  Yes    Hypertension [I10]  Yes    DDD (degenerative disc disease), lumbar [M51.369]  Yes       Mr. Andres is a 64 y.o. with a history of DM, HTN, chronic pain (arms and legs and back sounds like related to previous football) that presents to [facility] complaining of a few symptoms. He's had a couple of weeks of cough. Worried he had PNA so he had taken a course of what sounds like augmentin which he had at home.    He has chronic pain and on chronic pain meds. He has issues with constipation at times. He presented with constipation as well as N/V. No fever but some chills. Workup with CT A/P shows constipation and intra and extrahepatic biliary dilation.     Additionally had some heart palpations and elevated HR with numbness ro left arm. Denies real chest pain per se. No numbness/weakness in leg/stroke like symptoms.     Will have agents for constipation  Obtain MRCP  Cardiology consultation for heart palpitations with radiation to left arm. Troponins  negative.   Restart some of home meds. It sounds like some he is only getting intermittently   I discussed the patients findings and my recommendations with patient, family, and nursing staff.    VTE Prophylaxis - SCDs.         Matias Riojas MD  [facility]  03/14/25  12:21 EDT

## 2025-03-14 NOTE — DISCHARGE PLACEMENT REQUEST
"Kwame Lopez (64 y.o. Male)       Date of Birth   1960    Social Security Number       Address   [address] [place] [address]    Home Phone   [phone]    MRN   [id number]       M[employer]   Faith    Marital Status   Single                            Admission Date   3/13/2025    Admission Type   Emergency    Admitting Provider   Matias Riojas MD    Attending Provider   Matias Riojas MD    Department, Room/Bed   [address], S[address]/1       Discharge Date       Discharge Disposition       Discharge Destination                                 Attending Provider: Matias Riojas MD    Allergies: Shrimp    Isolation: None   Infection: MRSA/History Only (11/19/21)   Code Status: CPR    Ht: 188 cm (74.02\")   Wt: 130 kg (287 lb 0.6 oz)    Admission Cmt: None   Principal Problem: Generalized abdominal pain [R10.84]                   Active Insurance as of 3/13/2025       Primary Coverage       Payor Plan Insurance Group Employer/Plan Group    [place] [id number]       Payor Plan Address Payor Plan Phone Number Payor Plan Fax Number Effective Dates    [employer] [address] [address] [phone]  12/1/2023 - None Entered    [facility] [id number]         Subscriber Name Subscriber Birth Date Member ID       KWAME LOPEZ 1960 [id number]                     Emergency Contacts        (Rel.) Home Phone Work Phone Mobile Phone    Jose Angel Oslon (Father) [phone] -- [phone]    ZAC (CARETAKER)MAGDIEL (Sister) -- -- [phone]                "

## 2025-03-14 NOTE — ED PROVIDER NOTES
EMERGENCY DEPARTMENT ENCOUNTER    Room number:  29/29  Date Seen:  3/14/2025  PCP:  Provider, No Known    Laboratory Results:  Recent Results (from the past 24 hours)   Comprehensive Metabolic Panel    Collection Time: 03/13/25 10:38 PM    Specimen: Blood   Result Value Ref Range    Glucose 173 (H) 65 - 99 mg/dL    BUN 18 8 - 23 mg/dL    Creatinine 1.17 0.76 - 1.27 mg/dL    Sodium 136 136 - 145 mmol/L    Potassium 4.6 3.5 - 5.2 mmol/L    Chloride 95 (L) 98 - 107 mmol/L    CO2 30.4 (H) 22.0 - 29.0 mmol/L    Calcium 9.4 8.6 - 10.5 mg/dL    Total Protein 8.1 6.0 - 8.5 g/dL    Albumin 3.9 3.5 - 5.2 g/dL    ALT (SGPT) 38 1 - 41 U/L    AST (SGOT) 24 1 - 40 U/L    Alkaline Phosphatase 87 39 - 117 U/L    Total Bilirubin 0.3 0.0 - 1.2 mg/dL    Globulin 4.2 gm/dL    A/G Ratio 0.9 g/dL    BUN/Creatinine Ratio 15.4 7.0 - 25.0    Anion Gap 10.6 5.0 - 15.0 mmol/L    eGFR 69.6 >60.0 mL/min/1.73   Lipase    Collection Time: 03/13/25 10:38 PM    Specimen: Blood   Result Value Ref Range    Lipase 15 13 - 60 U/L   CBC Auto Differential    Collection Time: 03/13/25 10:38 PM    Specimen: Blood   Result Value Ref Range    WBC 10.06 3.40 - 10.80 10*3/mm3    RBC 4.84 4.14 - 5.80 10*6/mm3    Hemoglobin 14.7 13.0 - 17.7 g/dL    Hematocrit 43.7 37.5 - 51.0 %    MCV 90.3 79.0 - 97.0 fL    MCH 30.4 26.6 - 33.0 pg    MCHC 33.6 31.5 - 35.7 g/dL    RDW 12.3 12.3 - 15.4 %    RDW-SD 40.5 37.0 - 54.0 fl    MPV 10.5 6.0 - 12.0 fL    Platelets 202 140 - 450 10*3/mm3    Neutrophil % 70.3 42.7 - 76.0 %    Lymphocyte % 18.0 (L) 19.6 - 45.3 %    Monocyte % 9.2 5.0 - 12.0 %    Eosinophil % 1.7 0.3 - 6.2 %    Basophil % 0.4 0.0 - 1.5 %    Immature Grans % 0.4 0.0 - 0.5 %    Neutrophils, Absolute 7.07 (H) 1.70 - 7.00 10*3/mm3    Lymphocytes, Absolute 1.81 0.70 - 3.10 10*3/mm3    Monocytes, Absolute 0.93 (H) 0.10 - 0.90 10*3/mm3    Eosinophils, Absolute 0.17 0.00 - 0.40 10*3/mm3    Basophils, Absolute 0.04 0.00 - 0.20 10*3/mm3    Immature Grans, Absolute 0.04  0.00 - 0.05 10*3/mm3    nRBC 0.0 0.0 - 0.2 /100 WBC   High Sensitivity Troponin T    Collection Time: 03/13/25 10:38 PM    Specimen: Blood   Result Value Ref Range    HS Troponin T 13 <22 ng/L   BNP    Collection Time: 03/13/25 10:38 PM    Specimen: Blood   Result Value Ref Range    proBNP <36.0 0.0 - 900.0 pg/mL   ECG 12 Lead Chest Pain    Collection Time: 03/13/25 10:41 PM   Result Value Ref Range    QT Interval 394 ms    QTC Interval 475 ms   Respiratory Panel PCR w/COVID-19(SARS-CoV-2) [facility]/PORTER/JUAN/PAD/[facility] In-House, NP Swab in UTM/VTM, 2 HR TAT - Swab, Nasopharynx    Collection Time: 03/13/25 10:44 PM    Specimen: Nasopharynx; Swab   Result Value Ref Range    ADENOVIRUS, PCR Not Detected Not Detected    Coronavirus 229E Not Detected Not Detected    Coronavirus HKU1 Not Detected Not Detected    Coronavirus NL63 Not Detected Not Detected    Coronavirus OC43 Not Detected Not Detected    COVID19 Not Detected Not Detected - Ref. Range    Human Metapneumovirus Not Detected Not Detected    Human Rhinovirus/Enterovirus Not Detected Not Detected    Influenza A PCR Not Detected Not Detected    Influenza B PCR Not Detected Not Detected    Parainfluenza Virus 1 Not Detected Not Detected    Parainfluenza Virus 2 Not Detected Not Detected    Parainfluenza Virus 3 Not Detected Not Detected    Parainfluenza Virus 4 Not Detected Not Detected    RSV, PCR Not Detected Not Detected    Bordetella pertussis pcr Not Detected Not Detected    Bordetella parapertussis PCR Not Detected Not Detected    Chlamydophila pneumoniae PCR Not Detected Not Detected    Mycoplasma pneumo by PCR Not Detected Not Detected   High Sensitivity Troponin T 1Hr    Collection Time: 03/13/25 11:40 PM    Specimen: Arm, Right; Blood   Result Value Ref Range    HS Troponin T 11 <22 ng/L    Troponin T Numeric Delta -2 Abnormal if >/=3 ng/L     I reviewed the above results.    Radiology:  CT Abdomen Pelvis With Contrast   Final Result       1. The patient does  have central intrahepatic and mild extrahepatic   biliary dilatation. There is also some pancreatic atrophy, along with   some mild dilatation of the distal pancreatic duct. I don't see an   obvious obstructing lesion, but further assessment with MRCP is   recommended.   2. Increased stool burden throughout the colon may reflect constipation.       Radiation dose reduction techniques were utilized, including automated   exposure control and exposure modulation based on body size.           This report was finalized on 3/14/2025 1:04 AM by Dr. Janneth Corral M.D on Workstation: [id number]          XR Chest 1 View   Final Result   Patient appears to have areas of scarring within both lungs. No definite   acute infiltrates seen.       This report was finalized on 3/13/2025 11:48 PM by Dr. Janneth Corral M.D on Workstation: [id number]            I reviewed the above results    Medications ordered in ED:  Medications   sodium chloride 0.9 % flush 10 mL (has no administration in time range)   iopamidol (ISOVUE-300) 61 % injection 100 mL (85 mL Intravenous Given 3/14/25 0017)       ED Course as of 03/14/25 0256   Thu Mar 13, 2025   2246 EKG independently interpreted by myself.  Time 10:41 PM.  Sinus rhythm.  Heart rate 87.  First-degree AV block.  Normal axis.  No acute ST abnormality.  Low voltage in the precordial leads. [TD]   2321 Patient was turned over to me by Dr. Law.  Pending: CT and dispo   [CC]   Fri Mar 14, 2025   0157 I rechecked the patient.  I discussed the patient's labs, radiology findings (including all incidental findings), diagnosis, and plan for admission. The patient understands and agrees with the plan.   [CC]   8163 Spoke with SAMM Carpenter with A.  Reviewed history, exam, results, treatments.  She agrees admit the patient to Dr. Ritter.   [CC]      ED Course User Index  [CC] Ligia Zhang PA-C  [TD] Babak Law II, MD       Diagnosis:  Final diagnoses:    Constipation, unspecified constipation type   Generalized abdominal pain   Chest pain, unspecified type   Abnormal CT of the abdomen         Provider attestation:  I personally reviewed the past medical history, past surgical history, social history, family history, current medications, and allergies as they appear in the chart.    The patient was seen and examined by myself and Dr. Law, who agree with plan.      Ligia Zhang PA-C  03/14/25 0257

## 2025-03-14 NOTE — CASE MANAGEMENT/SOCIAL WORK
Discharge Planning Assessment  [facility]     Patient Name: Kwame Andres  MRN: [id number]  Today's Date: 3/14/2025    Admit Date: 3/13/2025    Plan: Return home with assist of his sister/ caregiver.   Discharge Needs Assessment       Row Name 03/14/25 0911       Living Environment    People in Home sibling(s)    Current Living Arrangements apartment    Potentially Unsafe Housing Conditions none    Primary Care Provided by other (see comments)    Provides Primary Care For no one, unable/limited ability to care for self    Family Caregiver if Needed sibling(s)    Quality of Family Relationships helpful;involved;supportive    Able to Return to Prior Arrangements yes       Resource/Environmental Concerns    Resource/Environmental Concerns none    Transportation Concerns none       Transportation Needs    In the past 12 months, has lack of transportation kept you from medical appointments or from getting medications? no    In the past 12 months, has lack of transportation kept you from meetings, work, or from getting things needed for daily living? No       Transition Planning    Patient/Family Anticipates Transition to home with family    Patient/Family Anticipated Services at Transition none    Transportation Anticipated family or friend will provide       Discharge Needs Assessment    Readmission Within the Last 30 Days no previous admission in last 30 days    Equipment Currently Used at Home cane, straight;walker, rolling;wheelchair;oxygen;nebulizer    Concerns to be Addressed denies needs/concerns at this time    Do you want help finding or keeping work or a job? I do not need or want help    Do you want help with school or training? For example, starting or completing job training or getting a high school diploma, GED or equivalent No    Anticipated Changes Related to Illness none    Equipment Needed After Discharge none    Provided Post Acute Provider List? N/A    Provided Post Acute Provider Quality &  Resource List? N/A                   Discharge Plan       Row Name 03/14/25 0914       Plan    Plan Return home with assist of his sister/ caregiver.    Plan Comments S/W pt at bedside.  His sister Yordy was also in the room, but was sleeping.  Pt gave permission to discuss DC planning w/ Yordy present.  Facesheet info confirmed.  Pt states he thinks that is the address, because they recently moved.  Yordy lives with him and is his caregiver.  Home DME includes a cane, walker, w/c, nebulizer and home O2 ([employer]).  Pt does not drive - Yordy provides transport.  No hx of HH or SNF.  Pt states he does not have a PCP - [facility] appt liaison contact # given and discussed.                  Continued Care and Services - Admitted Since 3/13/2025       Durable Medical Equipment       Service Provider Request Status Services Address Phone Fax Patient Preferred    [facility] Pending - Request Sent -- [address] [place] [address] [phone] --                     Demographic Summary       Row Name 03/14/25 0907       General Information    Admission Type observation    Arrived From home    Referral Source admission list    Reason for Consult discharge planning    Preferred Language English                   Functional Status       Row Name 03/14/25 0910       Functional Status    Usual Activity Tolerance moderate       Functional Status, IADL    Medications assistive person    Meal Preparation assistive person    Housekeeping assistive person    Laundry assistive person    Shopping assistive person    If for any reason you need help with day-to-day activities such as bathing, preparing meals, shopping, managing finances, etc., do you get the help you need? I get all the help I need       Mental Status    General Appearance WDL WDL       Mental Status Summary    Recent Changes in Mental Status/Cognitive Functioning no changes       Employment/    Employment Status retired                       Agnes Dumont RN

## 2025-03-14 NOTE — CONSULTS
Date of Hospital Visit: 25  Encounter Provider: Maggie Whaley MD  Place of Service: [facility] CARDIOLOGY  Patient Name: Kwame Andres  :1960  Referral Provider: No ref. provider found    Chief complaint: Abdominal Pain     History of Present Illness:    Kwame Andres is a 64 y.o. male with a past medical history significant for hypertension, diabetes, arthritis, chronic pain (narcotic dependent), constipation and Covid requiring intubation and a long hospitalization. Per the patient, he was sent home on oxygen after having Covid.  He has recently been using his oxygen at home up to 2.5 liters. He has a pulmonary consult pending at  in August.      He presented to the ED on 3-13-25 with complaints of generalized abdominal pain, worse on the left, that started this morning.  He also reports a productive cough and states he has taken antibiotics that he had at home due to concerns for pneumonia.  He also has complaints of palpitations but no overt chest pain.    Work up reveals negative troponin's x2, unremarkable chemistries and CBC aside from a glucose of 173, urine without nitrates or bacteria and a negative respiratory panel.  CXR without infiltrate  CT of the abdomen shows some biliary dilatation as well as mild dilatation of the distal pancreatic duct.  Also has stool throughout his colon.  EKG shows sinus rhythm, first degree AVB, no acute ischemic changes.    Cardiology hs been consulted for palpitations.  He is hard to get a history from.  He is tangential.  He says that he has a longstanding history of elevated heart rates.  He came with abdominal pain.  He says he is having trouble getting his methadone.  He denies dizziness, syncope, chest pain or pressure.              ECHO 21        Left ventricle size is normal.                            Moderate left ventricular hypertrophy.                            The estimated ejection fraction is  55-60%.                            No evidence of intracardiac vegetation or abscess consistent with endocarditis.     STRESS TEST 8-25-16  Myocardial perfusion imaging indicates a normal myocardial perfusion study with no evidence of ischemia.  Left ventricular ejection fraction is hyperdynamic (Calculated EF > 70%).  Impressions are consistent with a low risk study.      Past Medical History:   Diagnosis Date    Arthritis     Chronic pain     Diabetes mellitus     Herniated thoracic disc without myelopathy     by right scapula    Hypertension     Narcotic dependence     Pneumonia     PTSD (post-traumatic stress disorder)     Torn Achilles tendon        Past Surgical History:   Procedure Laterality Date    HIP SURGERY Right        Medications Prior to Admission   Medication Sig Dispense Refill Last Dose/Taking    carvedilol (COREG) 3.125 MG tablet Take 1 tablet by mouth 2 (Two) Times a Day With Meals. 60 tablet 0 9/1/2024    cloNIDine (CATAPRES) 0.1 MG tablet Take 1 tablet by mouth 3 (three) times a day. 21 tablet 0 Taking    insulin detemir (LEVEMIR) 100 UNIT/ML injection Inject 20 Units under the skin daily. in am  12 9/1/2024    metFORMIN (GLUCOPHAGE) 1000 MG tablet Take 1 tablet by mouth 2 (Two) Times a Day With Meals. 60 tablet 0 9/1/2024    bacitracin 500 UNIT/GM ointment Apply 1 application topically to the appropriate area as directed 2 (Two) Times a Day. 14 g 0     clonazePAM (KlonoPIN) 0.5 MG tablet Take 1 tablet by mouth 2 (Two) Times a Day As Needed.       DULoxetine (CYMBALTA) 30 MG capsule Take 1 capsule by mouth daily. 7 capsule 0     furosemide (LASIX) 40 MG tablet Take 1 tablet by mouth Daily. 30 tablet 0     hydrOXYzine (ATARAX) 50 MG tablet Take 1 tablet by mouth 3 (three) times a day as needed for itching. 21 tablet 0     Lidocaine Viscous HCl (XYLOCAINE) 2 % solution Take 10 mL by mouth As Needed for Mild Pain  or Moderate Pain . 20 mL 0     methadone (DOLOPHINE) 10 MG tablet Take 2 tablets by  "mouth 2 (Two) Times a Day,       pravastatin (PRAVACHOL) 20 MG tablet Take 2 tablets by mouth daily.       Zinc Sulfate 220 (50 Zn) MG tablet Take 220 mg by mouth.          Current Meds  Scheduled Meds:carvedilol, 3.125 mg, Oral, BID With Meals  cloNIDine, 0.1 mg, Oral, Q12H  famotidine, 20 mg, Intravenous, Q12H  insulin lispro, 2-9 Units, Subcutaneous, 4x Daily AC & at Bedtime  senna-docusate sodium, 2 tablet, Oral, BID   And  polyethylene glycol, 17 g, Oral, BID  sodium chloride, 10 mL, Intravenous, Q12H      Continuous Infusions:sodium chloride, 100 mL/hr, Last Rate: 100 mL/hr (03/14/25 1238)      PRN Meds:.  senna-docusate sodium **AND** polyethylene glycol **AND** bisacodyl **AND** bisacodyl    calcium carbonate    dextrose    dextrose    glucagon (human recombinant)    Morphine    ondansetron ODT **OR** ondansetron    [COMPLETED] Insert Peripheral IV **AND** sodium chloride    sodium chloride    sodium chloride    Allergies as of 03/13/2025 - Reviewed 03/13/2025   Allergen Reaction Noted    Shrimp Other (See Comments) 09/07/2019       Social History     Socioeconomic History    Marital status: Single   Tobacco Use    Smoking status: Never   Vaping Use    Vaping status: Never Used   Substance and Sexual Activity    Alcohol use: No    Drug use: No    Sexual activity: Defer       Family History   Problem Relation Age of Onset    Cancer Mother     Hypertension Mother        REVIEW OF SYSTEMS:   12 point ROS was performed and is negative except as outlined in HPI       Objective:   Temp:  [98.1 °F (36.7 °C)-98.2 °F (36.8 °C)] 98.2 °F (36.8 °C)  Heart Rate:  [75-95] 84  Resp:  [17-18] 18  BP: (106-133)/(45-92) 127/84  Body mass index is 36.84 kg/m².  Flowsheet Rows      Flowsheet Row First Filed Value   Admission Height 188 cm (74.02\") Documented at 03/14/2025 0421   Admission Weight 130 kg (287 lb 0.6 oz) Documented at 03/14/2025 0352          Vitals:    03/14/25 1117   BP: 127/84   Pulse: 84   Resp: 18   Temp: " 98.2 °F (36.8 °C)   SpO2:        General Appearance:    Alert, cooperative, in no acute distress   Head:    Normocephalic, without obvious abnormality, atraumatic   Throat:   oral mucosa moist   Lungs:     Clear to auscultation,respirations regular, even and unlabored    Heart:    Regular rhythm and normal rate, normal S1 and S2, no murmur, no gallop, no rub, no click   Extremities:   Moves all extremities well, no edema, no cyanosis, no redness   Pulses:   Pulses palpable and equal bilaterally. Normal radial, carotid,  dorsalis pedis and posterior tibial pulses bilaterally.      Lab Review:      Results from last 7 days   Lab Units 03/13/25  2238   SODIUM mmol/L 136   POTASSIUM mmol/L 4.6   CHLORIDE mmol/L 95*   CO2 mmol/L 30.4*   BUN mg/dL 18   CREATININE mg/dL 1.17   CALCIUM mg/dL 9.4   BILIRUBIN mg/dL 0.3   ALK PHOS U/L 87   ALT (SGPT) U/L 38   AST (SGOT) U/L 24   GLUCOSE mg/dL 173*     Results from last 7 days   Lab Units 03/13/25  2340 03/13/25  2238   HSTROP T ng/L 11 13     @LABRCNT(bnp)@  Results from last 7 days   Lab Units 03/14/25  1140 03/13/25  2238   WBC 10*3/mm3 7.88 10.06   HEMOGLOBIN g/dL 14.8 14.7   HEMATOCRIT % 45.3 43.7   PLATELETS 10*3/mm3 139* 202               3-14-25      3-4-24      I personally viewed and interpreted the patient's EKG/Telemetry data        Generalized abdominal pain    Chronic pain    Type 2 diabetes mellitus, without long-term current use of insulin    Hypertension    DDD (degenerative disc disease), lumbar    Methadone dependence    Slow transit constipation      Assessment and Plan:    Abdominal pain - per primary service.  Seems possibly due to constipation  Palpitations - home with [employer].  ECG normal and troponins negative.  DM type II  Hypertension  Methadone dependence  Lower extremity venous incompetence    Vitals are stable.  CT abdomen pelvis shows mild biliary dilation with increased stool burden.  ECG is stable    If echo is normal, we will sign off.    Maggie  ANTHONY Whaley MD  03/14/25  14:23 EDT.  Time spent in reviewing chart, discussion and examination:

## 2025-03-14 NOTE — ED PROVIDER NOTES
EMERGENCY DEPARTMENT ENCOUNTER    Room Number:  29/29  PCP: Provider, No Known    HPI:  Chief Complaint: Abdominal pain  A complete HPI/ROS/PMH/PSH/SH/FH are unobtainable due to: None  Context: Kwame Andres is a 64 y.o. male who presents to the ED c/o acute abdominal pain.  He presents complaining of generalized abdominal pain that began this morning.  It is primarily left-sided.  It feels like pressure.  Also reports having cough productive of yellow sputum.  I confirmed the patient does not have chest pain after reviewing the triage note.        PAST MEDICAL HISTORY  Active Ambulatory Problems     Diagnosis Date Noted    Chest pain at rest 08/24/2016    Chronic pain 08/24/2016    Type 2 diabetes mellitus, without long-term current use of insulin 08/24/2016    Hypertension 08/24/2016    DDD (degenerative disc disease), lumbar 08/24/2016    Pulmonary nodule, needs follow up PET scan as outpatient 08/26/2016    Drug-seeking behavior 08/26/2016    Depression with suicidal ideation 09/20/2016    History of methicillin resistant staphylococcus aureus (MRSA) 12/19/2019    PTSD (post-traumatic stress disorder) 12/19/2019    Opioid use disorder, moderate, in sustained remission 12/19/2019    Iron deficiency anemia 12/20/2019    Cellulitis of left hand 12/20/2019    Acute colitis 11/19/2021    Hematochezia 11/19/2021    Anticoagulated 11/19/2021    Polysubstance abuse 11/19/2021    Methadone dependence 11/19/2021    History of COVID-19 11/19/2021    C. difficile colitis 11/20/2021    Cellulitis of leg, right 03/04/2024     Resolved Ambulatory Problems     Diagnosis Date Noted    Left leg cellulitis 03/04/2024     Past Medical History:   Diagnosis Date    Arthritis     Diabetes mellitus     Herniated thoracic disc without myelopathy     Narcotic dependence     Pneumonia     Torn Achilles tendon          PAST SURGICAL HISTORY  Past Surgical History:   Procedure Laterality Date    HIP SURGERY Right          FAMILY  HISTORY  Family History   Problem Relation Age of Onset    Cancer Mother     Hypertension Mother          SOCIAL HISTORY  Social History     Socioeconomic History    Marital status: Single   Tobacco Use    Smoking status: Never   Vaping Use    Vaping status: Never Used   Substance and Sexual Activity    Alcohol use: No    Drug use: No    Sexual activity: Defer         ALLERGIES  Shrimp        REVIEW OF SYSTEMS  Review of Systems     Included in HPI  All systems reviewed and negative except for those discussed in HPI.       PHYSICAL EXAM  ED Triage Vitals [03/13/25 2130]   Temp Heart Rate Resp BP SpO2   98.2 °F (36.8 °C) 95 17 130/87 94 %      Temp src Heart Rate Source Patient Position BP Location FiO2 (%)   -- -- -- -- --       Physical Exam      GENERAL: no acute distress  HENT: nares patent  EYES: no scleral icterus  CV: regular rhythm, normal rate  RESPIRATORY: normal effort, clear to auscultation bilaterally  ABDOMEN: soft, generalized abdominal tenderness without event or guarding  MUSCULOSKELETAL: no deformity  NEURO: alert, moves all extremities, follows commands  PSYCH:  calm, cooperative  SKIN: warm, dry    Vital signs and nursing notes reviewed.          LAB RESULTS  No results found for this or any previous visit (from the past 24 hours).    Ordered the above labs and reviewed the results.        RADIOLOGY  No Radiology Exams Resulted Within Past 24 Hours    Ordered the above noted radiological studies. Reviewed by me in PACS.        MEDICATIONS GIVEN IN ER  Medications   sodium chloride 0.9 % flush 10 mL (has no administration in time range)         ORDERS PLACED DURING THIS VISIT:  Orders Placed This Encounter   Procedures    XR Chest 1 View    CT Abdomen Pelvis With Contrast    Comprehensive Metabolic Panel    Lipase    CBC Auto Differential    High Sensitivity Troponin T    BNP    Urinalysis With Microscopic If Indicated (No Culture) - Urine, Clean Catch    Monitor Blood Pressure    Continuous Pulse  Oximetry    ECG 12 Lead Chest Pain    Insert Peripheral IV    CBC & Differential         OUTPATIENT MEDICATION MANAGEMENT:  Current Facility-Administered Medications Ordered in Epic   Medication Dose Route Frequency Provider Last Rate Last Admin    sodium chloride 0.9 % flush 10 mL  10 mL Intravenous Babak Orona II, MD         Current Outpatient Medications Ordered in Epic   Medication Sig Dispense Refill    bacitracin 500 UNIT/GM ointment Apply 1 application topically to the appropriate area as directed 2 (Two) Times a Day. 14 g 0    carvedilol (COREG) 3.125 MG tablet Take 1 tablet by mouth 2 (Two) Times a Day With Meals. 60 tablet 0    clonazePAM (KlonoPIN) 0.5 MG tablet Take 1 tablet by mouth 2 (Two) Times a Day As Needed.      cloNIDine (CATAPRES) 0.1 MG tablet Take 1 tablet by mouth 3 (three) times a day. 21 tablet 0    DULoxetine (CYMBALTA) 30 MG capsule Take 1 capsule by mouth daily. 7 capsule 0    furosemide (LASIX) 40 MG tablet Take 1 tablet by mouth Daily. 30 tablet 0    hydrOXYzine (ATARAX) 50 MG tablet Take 1 tablet by mouth 3 (three) times a day as needed for itching. 21 tablet 0    insulin detemir (LEVEMIR) 100 UNIT/ML injection Inject 20 Units under the skin daily. in am  12    Lidocaine Viscous HCl (XYLOCAINE) 2 % solution Take 10 mL by mouth As Needed for Mild Pain  or Moderate Pain . 20 mL 0    metFORMIN (GLUCOPHAGE) 1000 MG tablet Take 1 tablet by mouth 2 (Two) Times a Day With Meals. 60 tablet 0    methadone (DOLOPHINE) 10 MG tablet Take 2 tablets by mouth 2 (Two) Times a Day,      pravastatin (PRAVACHOL) 20 MG tablet Take 2 tablets by mouth daily.      Zinc Sulfate 220 (50 Zn) MG tablet Take 220 mg by mouth.         PROCEDURES  Procedures          MEDICAL DECISION MAKING, PROGRESS, and CONSULTS    Discussion below represents my analysis of pertinent findings related to patient's condition, differential diagnosis, treatment plan and final disposition.            Differential  diagnosis:    Differential diagnosis includes but not limited to:  - hepatobiliary pathology such as cholecystitis, cholangitis, and symptomatic cholelithiasis  - Pancreatitis  - Dyspepsia  - Small bowel obstruction  - Appendicitis  - Diverticulitis  - UTI including pyelonephritis  - Ureteral stone  - Zoster  - Colitis, including infectious and ischemic  - Atypical ACS             Independent interpretation of labs, radiology studies, and discussions with consultants:  ED Course as of 03/15/25 0253   Thu Mar 13, 2025   2246 EKG independently interpreted by myself.  Time 10:41 PM.  Sinus rhythm.  Heart rate 87.  First-degree AV block.  Normal axis.  No acute ST abnormality.  Low voltage in the precordial leads. [TD]   2321 Patient was turned over to me by Dr. Law.  Pending: CT and dispo   [CC]   Fri Mar 14, 2025   0157 I rechecked the patient.  I discussed the patient's labs, radiology findings (including all incidental findings), diagnosis, and plan for admission. The patient understands and agrees with the plan.   [CC]   0253 Spoke with SAMM Carpenter with [facility].  Reviewed history, exam, results, treatments.  She agrees admit the patient to Dr. Ritter.   [CC]      ED Course User Index  [CC] Ligia Zhang PA-C  [TD] Babak Law II, MD         Clinical Scores:                                   DIAGNOSIS  Final diagnoses:   Constipation, unspecified constipation type   Generalized abdominal pain   Chest pain, unspecified type   Abnormal CT of the abdomen         DISPOSITION  Admit      Latest Documented Vital Signs:  As of 22:27 EDT  BP- 120/80 HR- 92 Temp- 98.2 °F (36.8 °C) O2 sat- 90%      --    Please note that portions of this were completed with a voice recognition program.       Note Disclaimer: At [facility], we believe that sharing information builds trust and better relationships. You are receiving this note because you are receiving care at [facility] or recently visited. It is possible  you will see health information before a provider has talked with you about it. This kind of information can be easy to misunderstand. To help you fully understand what it means for your health, we urge you to discuss this note with your provider.         Babak Law II, MD  03/15/25 0253

## 2025-03-15 ENCOUNTER — APPOINTMENT (OUTPATIENT)
Dept: CARDIOLOGY | Facility: HOSPITAL | Age: 65
End: 2025-03-15
Payer: MEDICAID

## 2025-03-15 ENCOUNTER — APPOINTMENT (OUTPATIENT)
Dept: MRI IMAGING | Facility: HOSPITAL | Age: 65
End: 2025-03-15
Payer: MEDICAID

## 2025-03-15 VITALS
HEIGHT: 74 IN | TEMPERATURE: 98.2 F | OXYGEN SATURATION: 96 % | BODY MASS INDEX: 36.83 KG/M2 | RESPIRATION RATE: 18 BRPM | HEART RATE: 82 BPM | WEIGHT: 287 LBS | DIASTOLIC BLOOD PRESSURE: 85 MMHG | SYSTOLIC BLOOD PRESSURE: 112 MMHG

## 2025-03-15 LAB
AORTIC ARCH: 3.1 CM
AORTIC DIMENSIONLESS INDEX: 0.94 (DI)
ASCENDING AORTA: 3.9 CM
AV MEAN PRESS GRAD SYS DOP V1V2: 2.6 MMHG
AV VMAX SYS DOP: 111.2 CM/SEC
BH CV ECHO MEAS - ACS: 2.8 CM
BH CV ECHO MEAS - AO MAX PG: 4.9 MMHG
BH CV ECHO MEAS - AO ROOT AREA (BSA CORRECTED): 1.5 CM2
BH CV ECHO MEAS - AO ROOT DIAM: 3.9 CM
BH CV ECHO MEAS - AO V2 VTI: 22.1 CM
BH CV ECHO MEAS - AVA(I,D): 4 CM2
BH CV ECHO MEAS - EDV(CUBED): 98.2 ML
BH CV ECHO MEAS - EDV(MOD-SP2): 120 ML
BH CV ECHO MEAS - EDV(MOD-SP4): 115 ML
BH CV ECHO MEAS - EF(MOD-SP2): 63.3 %
BH CV ECHO MEAS - EF(MOD-SP4): 66.1 %
BH CV ECHO MEAS - ESV(CUBED): 29.5 ML
BH CV ECHO MEAS - ESV(MOD-SP2): 44 ML
BH CV ECHO MEAS - ESV(MOD-SP4): 39 ML
BH CV ECHO MEAS - FS: 33 %
BH CV ECHO MEAS - IVS/LVPW: 1.12 CM
BH CV ECHO MEAS - IVSD: 1.45 CM
BH CV ECHO MEAS - LA DIMENSION: 3.7 CM
BH CV ECHO MEAS - LAT PEAK E' VEL: 8.5 CM/SEC
BH CV ECHO MEAS - LV DIASTOLIC VOL/BSA (35-75): 45.4 CM2
BH CV ECHO MEAS - LV MASS(C)D: 249.2 GRAMS
BH CV ECHO MEAS - LV MAX PG: 4 MMHG
BH CV ECHO MEAS - LV MEAN PG: 2.29 MMHG
BH CV ECHO MEAS - LV SYSTOLIC VOL/BSA (12-30): 15.4 CM2
BH CV ECHO MEAS - LV V1 MAX: 99.6 CM/SEC
BH CV ECHO MEAS - LV V1 VTI: 20.8 CM
BH CV ECHO MEAS - LVIDD: 4.6 CM
BH CV ECHO MEAS - LVIDS: 3.1 CM
BH CV ECHO MEAS - LVOT AREA: 4.3 CM2
BH CV ECHO MEAS - LVOT DIAM: 2.33 CM
BH CV ECHO MEAS - LVPWD: 1.29 CM
BH CV ECHO MEAS - MED PEAK E' VEL: 6.5 CM/SEC
BH CV ECHO MEAS - MV A DUR: 0.17 SEC
BH CV ECHO MEAS - MV A MAX VEL: 66.6 CM/SEC
BH CV ECHO MEAS - MV DEC SLOPE: 219.4 CM/SEC2
BH CV ECHO MEAS - MV DEC TIME: 276 SEC
BH CV ECHO MEAS - MV E MAX VEL: 60.3 CM/SEC
BH CV ECHO MEAS - MV E/A: 0.91
BH CV ECHO MEAS - MV MAX PG: 2.28 MMHG
BH CV ECHO MEAS - MV MEAN PG: 1.14 MMHG
BH CV ECHO MEAS - MV P1/2T: 97.1 MSEC
BH CV ECHO MEAS - MV V2 VTI: 23.2 CM
BH CV ECHO MEAS - MVA(P1/2T): 2.27 CM2
BH CV ECHO MEAS - MVA(VTI): 3.8 CM2
BH CV ECHO MEAS - PA ACC TIME: 0.12 SEC
BH CV ECHO MEAS - PA V2 MAX: 101.2 CM/SEC
BH CV ECHO MEAS - PULM A REVS DUR: 0.17 SEC
BH CV ECHO MEAS - PULM A REVS VEL: 28.5 CM/SEC
BH CV ECHO MEAS - PULM DIAS VEL: 30.4 CM/SEC
BH CV ECHO MEAS - PULM S/D: 0.85
BH CV ECHO MEAS - PULM SYS VEL: 25.8 CM/SEC
BH CV ECHO MEAS - QP/QS: 1.43
BH CV ECHO MEAS - RAP SYSTOLE: 3 MMHG
BH CV ECHO MEAS - RV MAX PG: 2.9 MMHG
BH CV ECHO MEAS - RV V1 MAX: 84.9 CM/SEC
BH CV ECHO MEAS - RV V1 VTI: 16.5 CM
BH CV ECHO MEAS - RVDD: 3.1 CM
BH CV ECHO MEAS - RVOT DIAM: 3.1 CM
BH CV ECHO MEAS - RVSP: 21.5 MMHG
BH CV ECHO MEAS - SUP REN AO DIAM: 2.6 CM
BH CV ECHO MEAS - SV(LVOT): 88.6 ML
BH CV ECHO MEAS - SV(MOD-SP2): 76 ML
BH CV ECHO MEAS - SV(MOD-SP4): 76 ML
BH CV ECHO MEAS - SV(RVOT): 126.5 ML
BH CV ECHO MEAS - SVI(LVOT): 35 ML/M2
BH CV ECHO MEAS - SVI(MOD-SP2): 30 ML/M2
BH CV ECHO MEAS - SVI(MOD-SP4): 30 ML/M2
BH CV ECHO MEAS - TAPSE (>1.6): 2.5 CM
BH CV ECHO MEAS - TR MAX PG: 18.5 MMHG
BH CV ECHO MEAS - TR MAX VEL: 215.3 CM/SEC
BH CV ECHO MEASUREMENTS AVERAGE E/E' RATIO: 8.04
BH CV XLRA - RV BASE: 3.3 CM
BH CV XLRA - RV LENGTH: 7.4 CM
BH CV XLRA - RV MID: 3.1 CM
BH CV XLRA - TDI S': 10.8 CM/SEC
GLUCOSE BLDC GLUCOMTR-MCNC: 128 MG/DL (ref 70–130)
GLUCOSE BLDC GLUCOMTR-MCNC: 162 MG/DL (ref 70–130)
GLUCOSE BLDC GLUCOMTR-MCNC: 194 MG/DL (ref 70–130)
LEFT ATRIUM VOLUME INDEX: 12.8 ML/M2
LV EF BIPLANE MOD: 63.7 %
SINUS: 3.5 CM
STJ: 3.5 CM

## 2025-03-15 PROCEDURE — 25510000002 GADOBENATE DIMEGLUMINE 529 MG/ML SOLUTION: Performed by: INTERNAL MEDICINE

## 2025-03-15 PROCEDURE — 93306 TTE W/DOPPLER COMPLETE: CPT | Performed by: INTERNAL MEDICINE

## 2025-03-15 PROCEDURE — 93246 EXT ECG>7D<15D RECORDING: CPT

## 2025-03-15 PROCEDURE — 96361 HYDRATE IV INFUSION ADD-ON: CPT

## 2025-03-15 PROCEDURE — A9577 INJ MULTIHANCE: HCPCS | Performed by: INTERNAL MEDICINE

## 2025-03-15 PROCEDURE — 93306 TTE W/DOPPLER COMPLETE: CPT

## 2025-03-15 PROCEDURE — G0378 HOSPITAL OBSERVATION PER HR: HCPCS

## 2025-03-15 PROCEDURE — 74183 MRI ABD W/O CNTR FLWD CNTR: CPT

## 2025-03-15 PROCEDURE — 96372 THER/PROPH/DIAG INJ SC/IM: CPT

## 2025-03-15 PROCEDURE — 25810000003 SODIUM CHLORIDE 0.9 % SOLUTION: Performed by: NURSE PRACTITIONER

## 2025-03-15 PROCEDURE — 96376 TX/PRO/DX INJ SAME DRUG ADON: CPT

## 2025-03-15 PROCEDURE — 25510000001 PERFLUTREN 6.52 MG/ML SUSPENSION 2 ML VIAL: Performed by: INTERNAL MEDICINE

## 2025-03-15 PROCEDURE — 63710000001 INSULIN LISPRO (HUMAN) PER 5 UNITS: Performed by: INTERNAL MEDICINE

## 2025-03-15 PROCEDURE — 82948 REAGENT STRIP/BLOOD GLUCOSE: CPT

## 2025-03-15 PROCEDURE — 25010000002 TESTOSTERONE CYPIONATE 200 MG/ML SOLUTION: Performed by: INTERNAL MEDICINE

## 2025-03-15 PROCEDURE — 25010000002 LORAZEPAM PER 2 MG: Performed by: INTERNAL MEDICINE

## 2025-03-15 RX ORDER — TESTOSTERONE CYPIONATE 200 MG/ML
200 INJECTION, SOLUTION INTRAMUSCULAR ONCE
Status: COMPLETED | OUTPATIENT
Start: 2025-03-15 | End: 2025-03-15

## 2025-03-15 RX ORDER — TESTOSTERONE CYPIONATE 200 MG/ML
200 INJECTION, SOLUTION INTRAMUSCULAR ONCE
Status: DISCONTINUED | OUTPATIENT
Start: 2025-03-15 | End: 2025-03-15

## 2025-03-15 RX ORDER — AMOXICILLIN 250 MG
2 CAPSULE ORAL 2 TIMES DAILY
Qty: 120 TABLET | Refills: 0 | Status: SHIPPED | OUTPATIENT
Start: 2025-03-15

## 2025-03-15 RX ORDER — CARVEDILOL 3.12 MG/1
3.12 TABLET ORAL 2 TIMES DAILY WITH MEALS
Qty: 60 TABLET | Refills: 0 | Status: SHIPPED | OUTPATIENT
Start: 2025-03-15

## 2025-03-15 RX ORDER — POLYETHYLENE GLYCOL 3350 17 G/17G
17 POWDER, FOR SOLUTION ORAL 2 TIMES DAILY
Qty: 1020 G | Refills: 0 | Status: SHIPPED | OUTPATIENT
Start: 2025-03-15

## 2025-03-15 RX ORDER — CLONIDINE HYDROCHLORIDE 0.1 MG/1
0.1 TABLET ORAL EVERY 12 HOURS SCHEDULED
Qty: 60 TABLET | Refills: 0 | Status: SHIPPED | OUTPATIENT
Start: 2025-03-15

## 2025-03-15 RX ORDER — PRAVASTATIN SODIUM 40 MG
40 TABLET ORAL DAILY
Qty: 30 TABLET | Refills: 0 | Status: SHIPPED | OUTPATIENT
Start: 2025-03-15

## 2025-03-15 RX ORDER — CLONIDINE HYDROCHLORIDE 0.1 MG/1
0.1 TABLET ORAL 2 TIMES DAILY
Qty: 60 TABLET | Refills: 0 | Status: SHIPPED | OUTPATIENT
Start: 2025-03-15 | End: 2025-03-15 | Stop reason: HOSPADM

## 2025-03-15 RX ADMIN — Medication 10 ML: at 08:41

## 2025-03-15 RX ADMIN — INSULIN LISPRO 2 UNITS: 100 INJECTION, SOLUTION INTRAVENOUS; SUBCUTANEOUS at 11:38

## 2025-03-15 RX ADMIN — SODIUM CHLORIDE 100 ML/HR: 0.9 INJECTION, SOLUTION INTRAVENOUS at 02:20

## 2025-03-15 RX ADMIN — CLONIDINE HYDROCHLORIDE 0.1 MG: 0.1 TABLET ORAL at 08:41

## 2025-03-15 RX ADMIN — LORAZEPAM 1 MG: 2 INJECTION INTRAMUSCULAR; INTRAVENOUS at 03:29

## 2025-03-15 RX ADMIN — TESTOSTERONE CYPIONATE 200 MG: 200 INJECTION, SOLUTION INTRAMUSCULAR at 15:01

## 2025-03-15 RX ADMIN — PERFLUTREN 2 ML: 6.52 INJECTION, SUSPENSION INTRAVENOUS at 10:09

## 2025-03-15 RX ADMIN — FAMOTIDINE 20 MG: 10 INJECTION INTRAVENOUS at 08:41

## 2025-03-15 RX ADMIN — GADOBENATE DIMEGLUMINE 20 ML: 529 INJECTION, SOLUTION INTRAVENOUS at 04:36

## 2025-03-15 RX ADMIN — CARVEDILOL 3.12 MG: 3.12 TABLET, FILM COATED ORAL at 08:40

## 2025-03-15 RX ADMIN — BISACODYL 10 MG: 10 SUPPOSITORY RECTAL at 12:36

## 2025-03-15 RX ADMIN — SENNOSIDES AND DOCUSATE SODIUM 2 TABLET: 50; 8.6 TABLET ORAL at 08:40

## 2025-03-15 RX ADMIN — POLYETHYLENE GLYCOL 3350 17 G: 17 POWDER, FOR SOLUTION ORAL at 08:41

## 2025-03-15 NOTE — PLAN OF CARE
Problem: Adult Inpatient Plan of Care  Goal: Plan of Care Review  Outcome: Progressing  Goal: Patient-Specific Goal (Individualized)  Outcome: Progressing  Goal: Absence of Hospital-Acquired Illness or Injury  Outcome: Progressing  Intervention: Identify and Manage Fall Risk  Recent Flowsheet Documentation  Taken 3/15/2025 1200 by Thelma Olsen RN  Safety Promotion/Fall Prevention:   activity supervised   safety round/check completed  Taken 3/15/2025 1000 by Thelma Olsen RN  Safety Promotion/Fall Prevention: patient off unit  Taken 3/15/2025 0840 by Thelma Olsen RN  Safety Promotion/Fall Prevention:   activity supervised   safety round/check completed  Intervention: Prevent Skin Injury  Recent Flowsheet Documentation  Taken 3/15/2025 1200 by Thelma Olsen RN  Body Position: position changed independently  Taken 3/15/2025 0840 by Thelma Olsen RN  Body Position: position changed independently  Intervention: Prevent and Manage VTE (Venous Thromboembolism) Risk  Recent Flowsheet Documentation  Taken 3/15/2025 0840 by Thelma Olsen RN  VTE Prevention/Management:   bilateral   SCDs (sequential compression devices) off   patient refused intervention  Intervention: Prevent Infection  Recent Flowsheet Documentation  Taken 3/15/2025 1200 by Thelma Olsen RN  Infection Prevention:   environmental surveillance performed   hand hygiene promoted   single patient room provided  Taken 3/15/2025 0840 by Thelma Olsen RN  Infection Prevention:   environmental surveillance performed   hand hygiene promoted   single patient room provided  Goal: Optimal Comfort and Wellbeing  Outcome: Progressing  Intervention: Provide Person-Centered Care  Recent Flowsheet Documentation  Taken 3/15/2025 0840 by Thelma Olsen RN  Trust Relationship/Rapport: care explained  Goal: Readiness for Transition of Care  Outcome: Progressing     Problem: Fall Injury Risk  Goal: Absence of Fall and Fall-Related Injury  Outcome: Progressing  Intervention:  Promote Injury-Free Environment  Recent Flowsheet Documentation  Taken 3/15/2025 1200 by Thelma Olsen RN  Safety Promotion/Fall Prevention:   activity supervised   safety round/check completed  Taken 3/15/2025 1000 by Thelma Olsen RN  Safety Promotion/Fall Prevention: patient off unit  Taken 3/15/2025 0840 by Thelma Olsen, DAYO  Safety Promotion/Fall Prevention:   activity supervised   safety round/check completed   Goal Outcome Evaluation:

## 2025-03-15 NOTE — CASE MANAGEMENT/SOCIAL WORK
Case Management Discharge Note      Final Note: dc home, [employer]/oxygen    Provided Post Acute Provider List?: N/A  Provided Post Acute Provider Quality & Resource List?: N/A    Selected Continued Care - Discharged on 3/15/2025 Admission date: 3/13/2025 - Discharge disposition: Home or Self Care      Destination    No services have been selected for the patient.                Durable Medical Equipment Coordination complete.      Service Provider Services Address Phone Fax Patient Preferred    [facility] [id number] [place] [id number] [phone] --              Dialysis/Infusion    No services have been selected for the patient.                Home Medical Care    No services have been selected for the patient.                Therapy    No services have been selected for the patient.                Community Resources    No services have been selected for the patient.                Community & DME    No services have been selected for the patient.                    Transportation Services  Private: Car    Final Discharge Disposition Code: 01 - home or self-care

## 2025-03-15 NOTE — NURSING NOTE
Pt's Spo2 on RA is 86% at rest, 2L O2 applied  via NC and pt's saturating above 95% , will continue to monitor

## 2025-03-15 NOTE — CASE MANAGEMENT/SOCIAL WORK
Continued Stay Note  [facility]     Patient Name: Kwame Andres  MRN: [id number]  Today's Date: 3/15/2025    Admit Date: 3/13/2025    Plan: Home via private auto, [employer] for home O2   Discharge Plan       Row Name 03/15/25 1246       Plan    Plan Home via private auto, [employer] for home O2    Patient/Family in Agreement with Plan yes    Plan Comments Contacted by staff RN stating pt has DC orders and needs home O2. Chart reviewed. Spoke with RN about documentation supporting need for home O2. Spoke with MD about need for portable oxygen concentrator/POC documentation in MD order. Called and spoke with /Justus and she will have POC delivered to pt's bedside.......JW                   Discharge Codes    No documentation.                 Expected Discharge Date and Time       Expected Discharge Date Expected Discharge Time    Mar 15, 2025               Quyen Sepulveda, RN

## 2025-03-15 NOTE — DISCHARGE SUMMARY
NAME: Kwame Andres ADMIT: 3/13/2025   : 1960  PCP: Provider, No Known    MRN: [id number] LOS: 0 days   AGE/SEX: 64 y.o. 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       SECONDARY DIAGNOSES  Past Medical History:   Diagnosis Date    Arthritis     Chronic pain     Diabetes mellitus     Herniated thoracic disc without myelopathy     by right scapula    Hypertension     Narcotic dependence     Pneumonia     PTSD (post-traumatic stress disorder)     Torn Achilles tendon        CONSULTS   Cardiology    HOSPITAL COURSE  Mr. Andres is a 64 y.o. with a history of DM, HTN, chronic pain (arms and legs and back sounds like related to previous football) that presents to [facility] complaining of a few symptoms. He's had a couple of weeks of cough. Worried he had PNA so he had taken a course of what sounds like augmentin which he had at home. No evidence for PNA here or bacterial infection.      He has chronic pain and on chronic pain meds. He has issues with constipation at times. He presented with constipation as well as N/V. No fever but some chills. Workup with CT A/P shows constipation and intra and extrahepatic biliary dilation.     We obtained MRCP which thankfully did not have any tumor or stones or any complicating acute features. We treated him for constipation which is a lot of his symptoms     Additionally had some heart  palpations and elevated HR with numbness ro left arm. Denies real chest pain per se. No numbness/weakness in leg/stroke like symptoms. He was seen by Cardiology who obtained ECHO which was ok and they planned on ZIO monitor at discharge.     He feels better and wishes for discharge today. I did help him with a few of his chronic conditions per request as had been some time since he had seen a PCP. I refilled his agents for HTN, DM (previously on insulin but A1C better can continue his metformin). He requested a testosterone injection as he states he has had extremely low testosterone in the past but his previous physician had passed away and he is in the process of re-establishing. I recommend he see a Endocrinologist. He has a few chronic medical and orthopedic conditions that he is going to be following up with outpatient providers. He has had issues with chronic hypoxemia which he states limites his activity and has had low oxygen in the past but did not have oxygen at home currently. He did qualify for oxygen at home so have given RX. Could also be reasonable to get outpatient sleep study through new PCP. Some of previous Rx's that were listed as home meds but that he hadn't taken over the past year or so I did not send as not clear if indicated to be on anymore.     DIAGNOSTICS  Collected Updated Procedure Result Status    03/14/2025 1654 03/14/2025 1745 Basic Metabolic Panel [[id number]]    (Abnormal)   Blood from Arm, Left    Final result Component Value Units   Glucose 166 High  mg/dL   BUN 13 mg/dL   Creatinine 0.89 mg/dL   Sodium 137 mmol/L   Potassium 4.3 mmol/L   Chloride 97 Low  mmol/L   CO2 28.2 mmol/L   Calcium 8.8 mg/dL   BUN/Creatinine Ratio 14.6    Anion Gap 11.8 mmol/L   eGFR 95.7 mL/min/1.73          03/14/2025 1654 03/14/2025 1745 High Sensitivity Troponin T [[id number]]    Blood from Arm, Left    Final result Component Value Units   HS Troponin T 13 ng/L          03/14/2025 1140 03/14/2025 1231  CBC Auto Differential [[id number]]   (Abnormal)   Blood    Final result Component Value Units   WBC 7.88 10*3/mm3   RBC 4.93 10*6/mm3   Hemoglobin 14.8 g/dL   Hematocrit 45.3 %   MCV 91.9 fL   MCH 30.0 pg   MCHC 32.7 g/dL   RDW 12.8 %   RDW-SD 43.3 fl   MPV 12.2 High  fL   Platelets 139 Low  10*3/mm3   Neutrophil % 65.8 %   Lymphocyte % 22.0 %   Monocyte % 8.9 %   Eosinophil % 2.4 %   Basophil % 0.5 %   Immature Grans % 0.4 %   Neutrophils, Absolute 5.19 10*3/mm3   Lymphocytes, Absolute 1.73 10*3/mm3   Monocytes, Absolute 0.70 10*3/mm3   Eosinophils, Absolute 0.19 10*3/mm3   Basophils, Absolute 0.04 10*3/mm3   Immature Grans, Absolute 0.03 10*3/mm3   nRBC 0.0 /100 WBC          03/14/2025 1140 03/14/2025 1309 Hemoglobin A1c [[id number]]    (Abnormal)   Blood    Final result Component Value Units   Hemoglobin A1C 7.40 High  %          03/14/2025 0716 03/14/2025 0750 Urinalysis With Microscopic If Indicated (No Culture) - Urine, Clean Catch [[id number]]    (Abnormal)   Urine, Clean Catch    Final result Component Value   Color, UA Yellow   Appearance, UA Clear   pH, UA 6.5   Specific [place], UA >1.030 High    Glucose,  mg/dL (Trace) Abnormal    Ketones, UA Negative   Bilirubin, UA Negative   Blood, UA Negative   Protein, UA Negative   Leuk Esterase, UA Negative   Nitrite, UA Negative   Urobilinogen, UA 1.0 E.U./dL          03/13/2025 2340 03/14/2025 0013 High Sensitivity Troponin T 1Hr [[id number]]    Blood from Arm, Right    Final result Component Value Units   HS Troponin T 11 ng/L   Troponin T Numeric Delta -2 ng/L          03/13/2025 2244 03/13/2025 2352 Respiratory Panel PCR w/COVID-19(SARS-CoV-2) LINDY/PORTER/JUAN/PAD/COR/SHAQUILLE In-House, NP Swab in UTM/VTM, 2 HR TAT - Swab, Nasopharynx [[id number]]    Swab from Nasopharynx    Final result Component Value   ADENOVIRUS, PCR Not Detected   Coronavirus 229E Not Detected   Coronavirus HKU1 Not Detected   Coronavirus NL63 Not Detected   Coronavirus OC43 Not Detected    COVID19 Not Detected   Human Metapneumovirus Not Detected   Human Rhinovirus/Enterovirus Not Detected   Influenza A PCR Not Detected   Influenza B PCR Not Detected   Parainfluenza Virus 1 Not Detected   Parainfluenza Virus 2 Not Detected   Parainfluenza Virus 3 Not Detected   Parainfluenza Virus 4 Not Detected   RSV, PCR Not Detected   Bordetella pertussis pcr Not Detected   Bordetella parapertussis PCR Not Detected   Chlamydophila pneumoniae PCR Not Detected   Mycoplasma pneumo by PCR Not Detected             03/13/2025 2238 03/13/2025 2317 Comprehensive Metabolic Panel [[id number]]    (Abnormal)   Blood    Final result Component Value Units   Glucose 173 High  mg/dL   BUN 18 mg/dL   Creatinine 1.17 mg/dL   Sodium 136 mmol/L   Potassium 4.6 mmol/L   Chloride 95 Low  mmol/L   CO2 30.4 High  mmol/L   Calcium 9.4 mg/dL   Total Protein 8.1 g/dL   Albumin 3.9 g/dL   ALT (SGPT) 38 U/L   AST (SGOT) 24 U/L   Alkaline Phosphatase 87 U/L   Total Bilirubin 0.3 mg/dL   Globulin 4.2 gm/dL   A/G Ratio 0.9 g/dL   BUN/Creatinine Ratio 15.4    Anion Gap 10.6 mmol/L   eGFR 69.6 mL/min/1.73          03/13/2025 2238 03/13/2025 2317 Lipase [[id number]]   Blood    Final result Component Value Units   Lipase 15 U/L          03/13/2025 2238 03/13/2025 2251 CBC Auto Differential [[id number]]   (Abnormal)   Blood    Final result Component Value Units   WBC 10.06 10*3/mm3   RBC 4.84 10*6/mm3   Hemoglobin 14.7 g/dL   Hematocrit 43.7 %   MCV 90.3 fL   MCH 30.4 pg   MCHC 33.6 g/dL   RDW 12.3 %   RDW-SD 40.5 fl   MPV 10.5 fL   Platelets 202 10*3/mm3   Neutrophil % 70.3 %   Lymphocyte % 18.0 Low  %   Monocyte % 9.2 %   Eosinophil % 1.7 %   Basophil % 0.4 %   Immature Grans % 0.4 %   Neutrophils, Absolute 7.07 High  10*3/mm3   Lymphocytes, Absolute 1.81 10*3/mm3   Monocytes, Absolute 0.93 High  10*3/mm3   Eosinophils, Absolute 0.17 10*3/mm3   Basophils, Absolute 0.04 10*3/mm3   Immature Grans, Absolute 0.04 10*3/mm3   nRBC 0.0 /100 WBC           03/13/2025 2238 03/13/2025 2317 High Sensitivity Troponin T [[id number]]    Blood    Final result Component Value Units   HS Troponin T 13 ng/L          03/13/2025 2238 03/13/2025 2317 BNP [[id number]]    Blood    Final result Component Value Units   proBNP <36.0 pg/mL         MRI abdomen w wo contrast mrcp [[id number]] Ron as Reviewed   Order Status: Completed Collected: 03/15/25 0802    Updated: 03/15/25 0814   Narrative:     MRI ABDOMEN W WO CONTRAST MRCP-     INDICATION: Intrahepatic and extrahepatic biliary ductal dilatation on  CT.     COMPARISON: CT abdomen pelvis March 14, 2025     TECHNIQUE: MRI of the abdomen with T1, T2, in and out of phase, MRCP and  diffusion weighted sequences with postcontrast dynamic phase imaging.  MRI liver.     FINDINGS:     Lung bases: Gynecomastia. Subsegmental atelectasis or scarring at the  bases. Mild cardiomegaly.     ABDOMEN: Motion artifact. No liver mass. Normal gallbladder. No  gallbladder wall thickening or cholelithiasis. Common bile duct measures  8 to 9 mm. Small amount of intrahepatic biliary ductal dilatation. No  choledocholithiasis or obstructing mass seen. Normal smooth tapering of  the distal CBD. Spleen is normal in size. Mild pancreatic atrophy. No  pancreatic ductal dilatation or mass seen. No adrenal nodules. Small T2  hyperintense cyst seen in the superior pole right kidney. No  solid-appearing renal mass or hydronephrosis.     Bowel: No obstruction.     Abdominal wall: Rectus diastases.     Retroperitoneum: No lymphadenopathy.     Vasculature: Patent. No abdominal aortic aneurysm.     Osseous structures: No destructive osseous lesion seen.      Impression:     Small amount of intrahepatic and extrahepatic biliary ductal dilatation.  No choledocholithiasis or obstructing mass seen. Normal smooth tapering  of the distal CBD.     This report was finalized on 3/15/2025 8:11 AM by Dr. Noam Sanford M.D on Workstation: [id number]       CT Abdomen Pelvis  With Contrast [[id number]] Ron as Reviewed   Order Status: Completed Collected: 03/14/25 0058    Updated: 03/14/25 0107   Narrative:     CT OF THE ABDOMEN AND PELVIS WITH CONTRAST     HISTORY: Abdominal pain     COMPARISON: March 4, 2024     TECHNIQUE: Axial CT imaging was obtained through the abdomen and pelvis.  IV contrast was administered.     FINDINGS:  Images through the lung bases demonstrate chronic fibrotic changes. No  suspicious hepatic lesions are seen. The stomach, duodenum, adrenal  glands and spleen appear normal. There is some mild intra and  extrahepatic biliary dilatation. Common bile duct measures up to 1.1 cm.  There is some pancreatic atrophy. This is more notable within the head  and neck. There is also some mild dilatation of the pancreatic duct,  measuring up to 5 mm. Further assessment with nonemergent MRCP is  suggested. Gallbladder, spleen, and adrenal glands are all normal. The  kidneys enhance symmetrically. There is no hydronephrosis. There is a  simple appearing right renal cyst. Prostate gland and urinary bladder  appear normal. Increased stool burden is noted throughout the colon,  suggesting constipation. There is no evidence of small bowel  obstruction. The appendix is normal. There are aortoiliac  calcifications. There are bilateral fat-containing greater than right.  No acute osseous abnormalities are seen.      Impression:        1. The patient does have central intrahepatic and mild extrahepatic  biliary dilatation. There is also some pancreatic atrophy, along with  some mild dilatation of the distal pancreatic duct. I don't see an  obvious obstructing lesion, but further assessment with MRCP is  recommended.  2. Increased stool burden throughout the colon may reflect constipation.     Radiation dose reduction techniques were utilized, including automated  exposure control and exposure modulation based on body size.        This report was finalized on 3/14/2025 1:04 AM by   "Janneth Corral M.D on Workstation: [id number]       XR Chest 1 View [[id number]] Ron as Reviewed   Order Status: Completed Collected: 03/13/25 2346    Updated: 03/13/25 2351   Narrative:     SINGLE VIEW OF THE CHEST     HISTORY: Chest pain     COMPARISON: March 4, 2024     FINDINGS:  There is cardiomegaly. There appears to be some vascular congestion. The  patient has areas of scarring within both lungs. No pneumothorax or  pleural effusion is seen. No definite acute infiltrates are identified.      Impression:     Patient appears to have areas of scarring within both lungs. No definite  acute infiltrates seen.       PHYSICAL EXAM  Objective:  Vital signs: (most recent): Blood pressure 112/85, pulse 75, temperature 98.2 °F (36.8 °C), resp. rate 18, height 188 cm (74\"), weight 130 kg (287 lb), SpO2 94%.                Alert  nad  No resp distress  Soft, nt  Decreased ROM of both upper and lower extremities due to previous orthopedic injuries    CONDITION ON DISCHARGE  Stable.      DISCHARGE DISPOSITION   Home or Self Care      DISCHARGE MEDICATIONS       Your medication list        START taking these medications        Instructions Last Dose Given Next Dose Due   polyethylene glycol 17 g packet  Commonly known as: MIRALAX      Take 17 g by mouth 2 (Two) Times a Day.       sennosides-docusate 8.6-50 MG per tablet  Commonly known as: PERICOLACE      Take 2 tablets by mouth 2 (Two) Times a Day.              CHANGE how you take these medications        Instructions Last Dose Given Next Dose Due   cloNIDine 0.1 MG tablet  Commonly known as: CATAPRES  What changed: when to take this      Take 1 tablet by mouth 2 (Two) Times a Day.       pravastatin 40 MG tablet  Commonly known as: PRAVACHOL  What changed: medication strength      Take 1 tablet by mouth Daily.              CONTINUE taking these medications        Instructions Last Dose Given Next Dose Due   carvedilol 3.125 MG tablet  Commonly known as: COREG      " Take 1 tablet by mouth 2 (Two) Times a Day With Meals.       hydrOXYzine 50 MG tablet  Commonly known as: ATARAX      Take 1 tablet by mouth 3 (three) times a day as needed for itching.       metFORMIN 1000 MG tablet  Commonly known as: GLUCOPHAGE      Take 1 tablet by mouth 2 (Two) Times a Day With Meals.       methadone 10 MG tablet  Commonly known as: DOLOPHINE      Take 2 tablets by mouth 2 (Two) Times a Day,       Zinc Sulfate 220 (50 Zn) MG tablet      Take 220 mg by mouth.              STOP taking these medications      bacitracin 500 UNIT/GM ointment        clonazePAM 0.5 MG tablet  Commonly known as: KlonoPIN        DULoxetine 30 MG capsule  Commonly known as: CYMBALTA        furosemide 40 MG tablet  Commonly known as: LASIX        insulin detemir 100 UNIT/ML injection  Commonly known as: LEVEMIR        Lidocaine Viscous HCl 2 % solution  Commonly known as: XYLOCAINE                  Where to Get Your Medications        These medications were sent to [place] [address]      Hours: Monday to Friday 7 AM to 6 PM, Saturday & Sunday 8 AM to 4:30 PM (Closed 12 PM to 12:30 PM) Phone: [phone]   metFORMIN 1000 MG tablet  polyethylene glycol 17 g packet  pravastatin 40 MG tablet  sennosides-docusate 8.6-50 MG per tablet       You can get these medications from any pharmacy    Bring a paper prescription for each of these medications  cloNIDine 0.1 MG tablet        No future appointments.  Additional Instructions for the Follow-ups that You Need to Schedule       Discharge Follow-up with Specialty: PCP in 1-2 weeks as able to get appointment. Cardiology in 4 weeks, Endocrinology as able to get appointment. Other specialists as previously recomended   As directed      Specialty: PCP in 1-2 weeks as able to get appointment. Cardiology in 4 weeks, Endocrinology as able to get appointment. Other specialists as previously recomended               Contact  information for follow-up providers       Provider, No Known .    Contact information:  [facility] [address] [phone]               Provider, No Known .    Contact information:  [facility] [address]                       Contact information for after-discharge care       Durable Medical Equipment       [facility] .    Service: Durable Medical Equipment  Contact information:  [address]  [facility] [address]  [phone]                                   TEST  RESULTS PENDING AT DISCHARGE         Matias Riojas MD  [facility]  03/15/25  11:50 EDT      Time: greater than 32 minutes on discharge  It was a pleasure taking care of this patient while in the hospital.

## 2025-04-10 ENCOUNTER — TELEPHONE (OUTPATIENT)
Dept: CARDIOLOGY | Age: 65
End: 2025-04-10

## 2025-04-10 LAB
CV ZIO BASELINE AVG BPM: 68 BPM
CV ZIO BASELINE BPM HIGH: 110 BPM
CV ZIO BASELINE BPM LOW: 49 BPM
CV ZIO DEVICE ANALYSIS TIME: NORMAL
CV ZIO ECT SVE COUNT: 272 EPISODES
CV ZIO ECT SVE CPLT COUNT: 0 EPISODES
CV ZIO ECT SVE CPLT FREQ: 0
CV ZIO ECT SVE FREQ: NORMAL
CV ZIO ECT SVE TPLT COUNT: 4 EPISODES
CV ZIO ECT SVE TPLT FREQ: NORMAL
CV ZIO ECT VE COUNT: 264 EPISODES
CV ZIO ECT VE CPLT COUNT: 21 EPISODES
CV ZIO ECT VE CPLT FREQ: NORMAL
CV ZIO ECT VE FREQ: NORMAL
CV ZIO ECT VE TPLT COUNT: 3 EPISODES
CV ZIO ECT VE TPLT FREQ: NORMAL
CV ZIO ECTOPIC SVE COUPLET RAW PERCENT: 0 %
CV ZIO ECTOPIC SVE ISOLATED PERCENT: 0.02 %
CV ZIO ECTOPIC SVE TRIPLET RAW PERCENT: 0 %
CV ZIO ECTOPIC VE COUPLET RAW PERCENT: 0 %
CV ZIO ECTOPIC VE ISOLATED PERCENT: 0.02 %
CV ZIO ECTOPIC VE TRIPLET RAW PERCENT: 0 %
CV ZIO ENROLLMENT END: NORMAL
CV ZIO ENROLLMENT START: NORMAL
CV ZIO PATIENT EVENTS DIARIES: 0
CV ZIO PATIENT EVENTS TRIGGERS: 1
CV ZIO PAUSE COUNT: 0
CV ZIO PRESCRIPTION STATUS: NORMAL
CV ZIO SVT AVG BPM: 99 BPM
CV ZIO SVT BPM HIGH: 110 BPM
CV ZIO SVT BPM LOW: 85 BPM
CV ZIO SVT COUNT: 1
CV ZIO SVT F EPI AVG BPM: 99 BPM
CV ZIO SVT F EPI BEATS: 5 BEATS
CV ZIO SVT F EPI BPM HIGH: 110 BPM
CV ZIO SVT F EPI BPM LOW: 85 BPM
CV ZIO SVT F EPI DUR: 3.2 SEC
CV ZIO SVT F EPI END: NORMAL
CV ZIO SVT F EPI START: NORMAL
CV ZIO SVT L EPI AVG BPM: 99 BPM
CV ZIO SVT L EPI BEATS: 5 BEATS
CV ZIO SVT L EPI BPM HIGH: 110 BPM
CV ZIO SVT L EPI BPM LOW: 85 BPM
CV ZIO SVT L EPI DUR: 3.2 SEC
CV ZIO SVT L EPI END: NORMAL
CV ZIO SVT L EPI START: NORMAL
CV ZIO TOTAL  ENROLLMENT PERIOD: NORMAL
CV ZIO VT COUNT: 0

## 2025-04-10 NOTE — TELEPHONE ENCOUNTER
Monitor came back overall normal with very rare arrhythmia.  The arrhythmia noted is benign.  No further monitoring needed, no medication changes and no follow-up needed cardiology-just follow-up with PCP.  Please call let the patient know.

## 2025-04-11 NOTE — TELEPHONE ENCOUNTER
Called and spoke with patient and his sister. Went over results and recommendations. They verbalized understanding.    Thank you,    Ya Ponce, RN  Triage [facility]  04/11/25 09:53 EDT

## 2025-06-30 ENCOUNTER — APPOINTMENT (OUTPATIENT)
Dept: GENERAL RADIOLOGY | Facility: HOSPITAL | Age: 65
End: 2025-06-30
Payer: MEDICAID

## 2025-06-30 ENCOUNTER — HOSPITAL ENCOUNTER (OUTPATIENT)
Facility: HOSPITAL | Age: 65
Setting detail: OBSERVATION
Discharge: HOME | End: 2025-07-03
Attending: EMERGENCY MEDICINE | Admitting: INTERNAL MEDICINE
Payer: MEDICAID

## 2025-06-30 ENCOUNTER — APPOINTMENT (OUTPATIENT)
Dept: CT IMAGING | Facility: HOSPITAL | Age: 65
End: 2025-06-30
Payer: MEDICAID

## 2025-06-30 DIAGNOSIS — J96.01 ACUTE HYPOXEMIC RESPIRATORY FAILURE: ICD-10-CM

## 2025-06-30 DIAGNOSIS — R42 DIZZINESS: ICD-10-CM

## 2025-06-30 DIAGNOSIS — R14.0 ABDOMINAL DISTENTION: ICD-10-CM

## 2025-06-30 DIAGNOSIS — R06.09 DYSPNEA ON EXERTION: Primary | ICD-10-CM

## 2025-06-30 LAB
ALBUMIN SERPL-MCNC: 3.9 G/DL (ref 3.5–5.2)
ALBUMIN/GLOB SERPL: 1.1 G/DL
ALP SERPL-CCNC: 78 U/L (ref 39–117)
ALT SERPL W P-5'-P-CCNC: 29 U/L (ref 1–41)
ANION GAP SERPL CALCULATED.3IONS-SCNC: 7.5 MMOL/L (ref 5–15)
AST SERPL-CCNC: 31 U/L (ref 1–40)
B PARAPERT DNA SPEC QL NAA+PROBE: NOT DETECTED
B PERT DNA SPEC QL NAA+PROBE: NOT DETECTED
BASOPHILS # BLD AUTO: 0.03 10*3/MM3 (ref 0–0.2)
BASOPHILS NFR BLD AUTO: 0.4 % (ref 0–1.5)
BILIRUB SERPL-MCNC: 0.5 MG/DL (ref 0–1.2)
BUN SERPL-MCNC: 8 MG/DL (ref 8–23)
BUN/CREAT SERPL: 9.9 (ref 7–25)
C PNEUM DNA NPH QL NAA+NON-PROBE: NOT DETECTED
CALCIUM SPEC-SCNC: 9.1 MG/DL (ref 8.6–10.5)
CHLORIDE SERPL-SCNC: 98 MMOL/L (ref 98–107)
CO2 SERPL-SCNC: 29.5 MMOL/L (ref 22–29)
CREAT SERPL-MCNC: 0.81 MG/DL (ref 0.76–1.27)
DEPRECATED RDW RBC AUTO: 53.7 FL (ref 37–54)
EGFRCR SERPLBLD CKD-EPI 2021: 98.5 ML/MIN/1.73
EOSINOPHIL # BLD AUTO: 0.12 10*3/MM3 (ref 0–0.4)
EOSINOPHIL NFR BLD AUTO: 1.7 % (ref 0.3–6.2)
ERYTHROCYTE [DISTWIDTH] IN BLOOD BY AUTOMATED COUNT: 15.3 % (ref 12.3–15.4)
FLUAV SUBTYP SPEC NAA+PROBE: NOT DETECTED
FLUBV RNA NPH QL NAA+NON-PROBE: NOT DETECTED
GEN 5 1HR TROPONIN T REFLEX: 11 NG/L
GLOBULIN UR ELPH-MCNC: 3.4 GM/DL
GLUCOSE BLDC GLUCOMTR-MCNC: 131 MG/DL (ref 70–130)
GLUCOSE SERPL-MCNC: 137 MG/DL (ref 65–99)
HADV DNA SPEC NAA+PROBE: NOT DETECTED
HCOV 229E RNA SPEC QL NAA+PROBE: NOT DETECTED
HCOV HKU1 RNA SPEC QL NAA+PROBE: NOT DETECTED
HCOV NL63 RNA SPEC QL NAA+PROBE: NOT DETECTED
HCOV OC43 RNA SPEC QL NAA+PROBE: NOT DETECTED
HCT VFR BLD AUTO: 47.8 % (ref 37.5–51)
HGB BLD-MCNC: 15.2 G/DL (ref 13–17.7)
HMPV RNA NPH QL NAA+NON-PROBE: NOT DETECTED
HPIV1 RNA ISLT QL NAA+PROBE: NOT DETECTED
HPIV2 RNA SPEC QL NAA+PROBE: NOT DETECTED
HPIV3 RNA NPH QL NAA+PROBE: NOT DETECTED
HPIV4 P GENE NPH QL NAA+PROBE: NOT DETECTED
IMM GRANULOCYTES # BLD AUTO: 0.02 10*3/MM3 (ref 0–0.05)
IMM GRANULOCYTES NFR BLD AUTO: 0.3 % (ref 0–0.5)
LIPASE SERPL-CCNC: 16 U/L (ref 13–60)
LYMPHOCYTES # BLD AUTO: 1.04 10*3/MM3 (ref 0.7–3.1)
LYMPHOCYTES NFR BLD AUTO: 14.8 % (ref 19.6–45.3)
M PNEUMO IGG SER IA-ACNC: NOT DETECTED
MCH RBC QN AUTO: 30.3 PG (ref 26.6–33)
MCHC RBC AUTO-ENTMCNC: 31.8 G/DL (ref 31.5–35.7)
MCV RBC AUTO: 95.2 FL (ref 79–97)
MONOCYTES # BLD AUTO: 0.73 10*3/MM3 (ref 0.1–0.9)
MONOCYTES NFR BLD AUTO: 10.4 % (ref 5–12)
NEUTROPHILS NFR BLD AUTO: 5.07 10*3/MM3 (ref 1.7–7)
NEUTROPHILS NFR BLD AUTO: 72.4 % (ref 42.7–76)
NRBC BLD AUTO-RTO: 0 /100 WBC (ref 0–0.2)
NT-PROBNP SERPL-MCNC: 67.6 PG/ML (ref 0–900)
PLATELET # BLD AUTO: 199 10*3/MM3 (ref 140–450)
PMV BLD AUTO: 10.9 FL (ref 6–12)
POTASSIUM SERPL-SCNC: 5 MMOL/L (ref 3.5–5.2)
PROT SERPL-MCNC: 7.3 G/DL (ref 6–8.5)
QT INTERVAL: 426 MS
QTC INTERVAL: 422 MS
RBC # BLD AUTO: 5.02 10*6/MM3 (ref 4.14–5.8)
RHINOVIRUS RNA SPEC NAA+PROBE: NOT DETECTED
RSV RNA NPH QL NAA+NON-PROBE: NOT DETECTED
SARS-COV-2 RNA NPH QL NAA+NON-PROBE: NOT DETECTED
SODIUM SERPL-SCNC: 135 MMOL/L (ref 136–145)
TROPONIN T NUMERIC DELTA: -2 NG/L
TROPONIN T SERPL HS-MCNC: 13 NG/L
WBC NRBC COR # BLD AUTO: 7.01 10*3/MM3 (ref 3.4–10.8)

## 2025-06-30 PROCEDURE — 93010 ELECTROCARDIOGRAM REPORT: CPT | Performed by: INTERNAL MEDICINE

## 2025-06-30 PROCEDURE — 99285 EMERGENCY DEPT VISIT HI MDM: CPT

## 2025-06-30 PROCEDURE — 36415 COLL VENOUS BLD VENIPUNCTURE: CPT | Performed by: EMERGENCY MEDICINE

## 2025-06-30 PROCEDURE — G0378 HOSPITAL OBSERVATION PER HR: HCPCS

## 2025-06-30 PROCEDURE — 83690 ASSAY OF LIPASE: CPT | Performed by: EMERGENCY MEDICINE

## 2025-06-30 PROCEDURE — 80053 COMPREHEN METABOLIC PANEL: CPT | Performed by: EMERGENCY MEDICINE

## 2025-06-30 PROCEDURE — 71045 X-RAY EXAM CHEST 1 VIEW: CPT

## 2025-06-30 PROCEDURE — 83880 ASSAY OF NATRIURETIC PEPTIDE: CPT | Performed by: EMERGENCY MEDICINE

## 2025-06-30 PROCEDURE — 0202U NFCT DS 22 TRGT SARS-COV-2: CPT | Performed by: STUDENT IN AN ORGANIZED HEALTH CARE EDUCATION/TRAINING PROGRAM

## 2025-06-30 PROCEDURE — 85025 COMPLETE CBC W/AUTO DIFF WBC: CPT | Performed by: EMERGENCY MEDICINE

## 2025-06-30 PROCEDURE — 25510000001 IOPAMIDOL PER 1 ML: Performed by: EMERGENCY MEDICINE

## 2025-06-30 PROCEDURE — 93005 ELECTROCARDIOGRAM TRACING: CPT | Performed by: EMERGENCY MEDICINE

## 2025-06-30 PROCEDURE — 63710000001 PREDNISONE PER 1 MG: Performed by: STUDENT IN AN ORGANIZED HEALTH CARE EDUCATION/TRAINING PROGRAM

## 2025-06-30 PROCEDURE — 82948 REAGENT STRIP/BLOOD GLUCOSE: CPT

## 2025-06-30 PROCEDURE — 71275 CT ANGIOGRAPHY CHEST: CPT

## 2025-06-30 PROCEDURE — 84484 ASSAY OF TROPONIN QUANT: CPT | Performed by: EMERGENCY MEDICINE

## 2025-06-30 PROCEDURE — 74177 CT ABD & PELVIS W/CONTRAST: CPT

## 2025-06-30 RX ORDER — AMOXICILLIN 250 MG
2 CAPSULE ORAL 2 TIMES DAILY
Status: DISCONTINUED | OUTPATIENT
Start: 2025-06-30 | End: 2025-07-03 | Stop reason: HOSPADM

## 2025-06-30 RX ORDER — ALBUTEROL SULFATE 0.83 MG/ML
2.5 SOLUTION RESPIRATORY (INHALATION) EVERY 6 HOURS PRN
Status: DISCONTINUED | OUTPATIENT
Start: 2025-06-30 | End: 2025-07-03 | Stop reason: HOSPADM

## 2025-06-30 RX ORDER — PREDNISONE 20 MG/1
40 TABLET ORAL
Status: DISCONTINUED | OUTPATIENT
Start: 2025-06-30 | End: 2025-07-03 | Stop reason: HOSPADM

## 2025-06-30 RX ORDER — AMOXICILLIN 250 MG
2 CAPSULE ORAL 2 TIMES DAILY PRN
Status: DISCONTINUED | OUTPATIENT
Start: 2025-06-30 | End: 2025-07-03 | Stop reason: HOSPADM

## 2025-06-30 RX ORDER — DEXTROSE MONOHYDRATE 25 G/50ML
25 INJECTION, SOLUTION INTRAVENOUS
Status: DISCONTINUED | OUTPATIENT
Start: 2025-06-30 | End: 2025-07-03 | Stop reason: HOSPADM

## 2025-06-30 RX ORDER — CLONAZEPAM 0.5 MG/1
0.5 TABLET ORAL NIGHTLY PRN
Status: DISCONTINUED | OUTPATIENT
Start: 2025-06-30 | End: 2025-07-01

## 2025-06-30 RX ORDER — CLONAZEPAM 0.5 MG/1
0.5 TABLET ORAL NIGHTLY PRN
COMMUNITY

## 2025-06-30 RX ORDER — IOPAMIDOL 755 MG/ML
100 INJECTION, SOLUTION INTRAVASCULAR
Status: COMPLETED | OUTPATIENT
Start: 2025-06-30 | End: 2025-06-30

## 2025-06-30 RX ORDER — ACETAMINOPHEN 650 MG/1
650 SUPPOSITORY RECTAL EVERY 4 HOURS PRN
Status: DISCONTINUED | OUTPATIENT
Start: 2025-06-30 | End: 2025-07-03 | Stop reason: HOSPADM

## 2025-06-30 RX ORDER — NICOTINE POLACRILEX 4 MG
15 LOZENGE BUCCAL
Status: DISCONTINUED | OUTPATIENT
Start: 2025-06-30 | End: 2025-07-03 | Stop reason: HOSPADM

## 2025-06-30 RX ORDER — IPRATROPIUM BROMIDE AND ALBUTEROL SULFATE 2.5; .5 MG/3ML; MG/3ML
3 SOLUTION RESPIRATORY (INHALATION)
Status: DISCONTINUED | OUTPATIENT
Start: 2025-06-30 | End: 2025-07-03 | Stop reason: HOSPADM

## 2025-06-30 RX ORDER — SODIUM CHLORIDE 0.9 % (FLUSH) 0.9 %
10 SYRINGE (ML) INJECTION AS NEEDED
Status: DISCONTINUED | OUTPATIENT
Start: 2025-06-30 | End: 2025-07-03 | Stop reason: HOSPADM

## 2025-06-30 RX ORDER — IBUPROFEN 600 MG/1
1 TABLET ORAL
Status: DISCONTINUED | OUTPATIENT
Start: 2025-06-30 | End: 2025-07-03 | Stop reason: HOSPADM

## 2025-06-30 RX ORDER — POLYETHYLENE GLYCOL 3350 17 G/17G
17 POWDER, FOR SOLUTION ORAL DAILY PRN
Status: DISCONTINUED | OUTPATIENT
Start: 2025-06-30 | End: 2025-07-03 | Stop reason: HOSPADM

## 2025-06-30 RX ORDER — BISACODYL 5 MG/1
5 TABLET, DELAYED RELEASE ORAL DAILY PRN
Status: DISCONTINUED | OUTPATIENT
Start: 2025-06-30 | End: 2025-07-03 | Stop reason: HOSPADM

## 2025-06-30 RX ORDER — POLYETHYLENE GLYCOL 3350 17 G/17G
1 POWDER, FOR SOLUTION ORAL 2 TIMES DAILY
Status: DISCONTINUED | OUTPATIENT
Start: 2025-06-30 | End: 2025-07-03 | Stop reason: HOSPADM

## 2025-06-30 RX ORDER — CARVEDILOL 3.12 MG/1
3.12 TABLET ORAL 2 TIMES DAILY WITH MEALS
Status: DISCONTINUED | OUTPATIENT
Start: 2025-06-30 | End: 2025-07-03 | Stop reason: HOSPADM

## 2025-06-30 RX ORDER — PRAVASTATIN SODIUM 20 MG
40 TABLET ORAL DAILY
Status: DISCONTINUED | OUTPATIENT
Start: 2025-06-30 | End: 2025-07-03 | Stop reason: HOSPADM

## 2025-06-30 RX ORDER — ACETAMINOPHEN 325 MG/1
650 TABLET ORAL EVERY 4 HOURS PRN
Status: DISCONTINUED | OUTPATIENT
Start: 2025-06-30 | End: 2025-07-03 | Stop reason: HOSPADM

## 2025-06-30 RX ORDER — HYDROXYZINE HYDROCHLORIDE 50 MG/1
50 TABLET, FILM COATED ORAL 3 TIMES DAILY PRN
Status: DISCONTINUED | OUTPATIENT
Start: 2025-06-30 | End: 2025-07-03 | Stop reason: HOSPADM

## 2025-06-30 RX ORDER — ONDANSETRON 4 MG/1
4 TABLET, ORALLY DISINTEGRATING ORAL EVERY 6 HOURS PRN
Status: DISCONTINUED | OUTPATIENT
Start: 2025-06-30 | End: 2025-07-03 | Stop reason: HOSPADM

## 2025-06-30 RX ORDER — ONDANSETRON 2 MG/ML
4 INJECTION INTRAMUSCULAR; INTRAVENOUS EVERY 6 HOURS PRN
Status: DISCONTINUED | OUTPATIENT
Start: 2025-06-30 | End: 2025-07-03 | Stop reason: HOSPADM

## 2025-06-30 RX ORDER — CLONIDINE HYDROCHLORIDE 0.1 MG/1
0.1 TABLET ORAL EVERY 12 HOURS SCHEDULED
Status: DISCONTINUED | OUTPATIENT
Start: 2025-06-30 | End: 2025-07-03 | Stop reason: HOSPADM

## 2025-06-30 RX ORDER — INSULIN LISPRO 100 [IU]/ML
2-7 INJECTION, SOLUTION INTRAVENOUS; SUBCUTANEOUS
Status: DISCONTINUED | OUTPATIENT
Start: 2025-06-30 | End: 2025-07-03 | Stop reason: HOSPADM

## 2025-06-30 RX ORDER — ACETAMINOPHEN 160 MG/5ML
650 SOLUTION ORAL EVERY 4 HOURS PRN
Status: DISCONTINUED | OUTPATIENT
Start: 2025-06-30 | End: 2025-07-03 | Stop reason: HOSPADM

## 2025-06-30 RX ORDER — METHADONE HYDROCHLORIDE 10 MG/1
20 TABLET ORAL 2 TIMES DAILY
Refills: 0 | Status: DISCONTINUED | OUTPATIENT
Start: 2025-06-30 | End: 2025-07-03 | Stop reason: HOSPADM

## 2025-06-30 RX ORDER — BISACODYL 10 MG
10 SUPPOSITORY, RECTAL RECTAL DAILY PRN
Status: DISCONTINUED | OUTPATIENT
Start: 2025-06-30 | End: 2025-07-03 | Stop reason: HOSPADM

## 2025-06-30 RX ADMIN — CLONIDINE HYDROCHLORIDE 0.1 MG: 0.1 TABLET ORAL at 20:31

## 2025-06-30 RX ADMIN — IOPAMIDOL 95 ML: 755 INJECTION, SOLUTION INTRAVENOUS at 12:41

## 2025-06-30 RX ADMIN — Medication 2.5 MG: at 20:30

## 2025-06-30 RX ADMIN — PRAVASTATIN SODIUM 40 MG: 20 TABLET ORAL at 20:31

## 2025-06-30 RX ADMIN — CLONAZEPAM 0.5 MG: 0.5 TABLET ORAL at 21:44

## 2025-06-30 RX ADMIN — METHADONE HYDROCHLORIDE 20 MG: 10 TABLET ORAL at 20:31

## 2025-06-30 RX ADMIN — CARVEDILOL 3.12 MG: 3.12 TABLET, FILM COATED ORAL at 20:31

## 2025-06-30 RX ADMIN — PREDNISONE 40 MG: 20 TABLET ORAL at 21:44

## 2025-06-30 RX ADMIN — DOCUSATE SODIUM 50 MG AND SENNOSIDES 8.6 MG 2 TABLET: 8.6; 5 TABLET, FILM COATED ORAL at 20:31

## 2025-06-30 NOTE — ED PROVIDER NOTES
EMERGENCY DEPARTMENT ENCOUNTER  Room Number:  28/28  PCP: Provider, No Known  Independent Historians: Patient and EMS      HPI:  Chief Complaint: had concerns including Shortness of Breath and Abdominal Pain.   A complete HPI/ROS/PMH/PSH/SH/FH are unobtainable due to: None    Context: Kwame Andres is a 64 y.o. male with a medical history of hypertension, diabetes, arthritis, chronic pain and PTSD who presents to the ED c/o acute shortness of breath, dizziness and abdominal distention.  Patient says that when he woke up this morning he was feeling very short of breath and dizzy whenever he was upright and trying to do his normal activities to start the day.  He felt also that his abdomen was swollen and distended and making it harder for him to breathe.  He denies any fevers.  He denies any cough or flulike symptoms.  Denies vomiting or diarrhea.  He has been taking his usual medications as directed.  However he does note that he has doctor did not recently refill his Klonopin medication and so he wonders if his anxiety may have contributed to some of the symptoms this morning.  He was going to try to go to his doctor's office for evaluation of the symptoms this morning.  However as he was getting up and trying to go to the car he became even more short of breath dizzy.  So he called 911 and paramedics brought him here instead.      Review of prior external notes (non-ED) -and- Review of prior external test results outside of this encounter: I independently reviewed the cardiology consult record from March 14, 2025.  Patient had been admitted for management of abdominal pain primarily at that time.  Also was complaining of palpitations.  Had arrangements for a Zio patch at discharge.  Echocardiogram study at that admission indicated ejection fraction of 63.7%.    Prescription drug monitoring program review: HILDA reviewed by Peace Rothman MD, Devon Montez MD KASPER: N/A and HILDA query complete  and reviewed. Patient receives regular prescriptions for controlled substances.    PAST MEDICAL HISTORY  Active Ambulatory Problems     Diagnosis Date Noted    Chest pain at rest 08/24/2016    Chronic pain 08/24/2016    Type 2 diabetes mellitus, without long-term current use of insulin 08/24/2016    Hypertension 08/24/2016    DDD (degenerative disc disease), lumbar 08/24/2016    Pulmonary nodule, needs follow up PET scan as outpatient 08/26/2016    Drug-seeking behavior 08/26/2016    Depression with suicidal ideation 09/20/2016    History of methicillin resistant staphylococcus aureus (MRSA) 12/19/2019    PTSD (post-traumatic stress disorder) 12/19/2019    Opioid use disorder, moderate, in sustained remission 12/19/2019    Iron deficiency anemia 12/20/2019    Cellulitis of left hand 12/20/2019    Acute colitis 11/19/2021    Hematochezia 11/19/2021    Anticoagulated 11/19/2021    Polysubstance abuse 11/19/2021    Methadone dependence 11/19/2021    History of COVID-19 11/19/2021    C. difficile colitis 11/20/2021    Cellulitis of leg, right 03/04/2024    Generalized abdominal pain 03/14/2025    Slow transit constipation 03/14/2025     Resolved Ambulatory Problems     Diagnosis Date Noted    Left leg cellulitis 03/04/2024     Past Medical History:   Diagnosis Date    Arthritis     Diabetes mellitus     Herniated thoracic disc without myelopathy     Narcotic dependence     Pneumonia     Torn Achilles tendon          PAST SURGICAL HISTORY  Past Surgical History:   Procedure Laterality Date    HIP SURGERY Right          FAMILY HISTORY  Family History   Problem Relation Age of Onset    Cancer Mother     Hypertension Mother          SOCIAL HISTORY  Social History     Socioeconomic History    Marital status: Single   Tobacco Use    Smoking status: Never   Vaping Use    Vaping status: Never Used   Substance and Sexual Activity    Alcohol use: No    Drug use: No    Sexual activity: Defer       Chronic or social conditions  impacting patient care (Social Determinants of Health):  Social Drivers of Health     Tobacco Use: Unknown (6/30/2025)    Patient History     Smoking Tobacco Use: Never     Smokeless Tobacco Use: Unknown     Passive Exposure: Not on file   Alcohol Use: Not At Risk (3/14/2025)    AUDIT-C     Frequency of Alcohol Consumption: Never     Average Number of Drinks: Patient does not drink     Frequency of Binge Drinking: Never   Financial Resource Strain: High Risk (9/4/2024)    Received from [facility]    Overall Financial Resource Strain (CARDIA)     Difficulty of Paying Living Expenses: Very hard   Food Insecurity: No Food Insecurity (9/4/2024)    Received from [facility]    Hunger Vital Sign     Worried About Running Out of Food in the Last Year: Never true     Ran Out of Food in the Last Year: Never true   Transportation Needs: No Transportation Needs (3/14/2025)    PRAPARE - Transportation     Lack of Transportation (Medical): No     Lack of Transportation (Non-Medical): No   Physical Activity: Not on file   Stress: Stress Concern Present (9/4/2024)    Received from [facility]    [employer] [place] [employer] - Occupational Stress Questionnaire     Feeling of Stress : To some extent   Social Connections: Unknown (3/14/2025)    Family and Community Support     Help with Day-to-Day Activities: I get all the help I need     Lonely or Isolated: Not on file   Interpersonal Safety: Not At Risk (6/30/2025)    Abuse Screen     Unsafe at Home or Work/School: no     Feels Threatened by Someone?: no     Does Anyone Keep You from Contacting Others or Doint Things Outside the Home?: no     Physical Sign of Abuse Present: no   Depression: At risk (1/28/2025)    Received from [facility]    Depression     PHQ-9 Total Score: 16   Housing Stability: Not At Risk (3/14/2025)    Housing Stability     Current Living Arrangements: apartment     Potentially Unsafe Housing Conditions: none   Utilities: Not At  Risk (9/4/2024)    Received from [facility]    [facility] Utilities     Threatened with loss of utilities: No   Health Literacy: Not At Risk (3/14/2025)    Education     Help with school or training?: No     Preferred Language: English   Employment: Not At Risk (3/14/2025)    Employment     Do you want help finding or keeping work or a job?: I do not need or want help   Disabilities: At Risk (3/14/2025)    Disabilities     Concentrating, Remembering, or Making Decisions Difficulty: no     Doing Errands Independently Difficulty: yes       ALLERGIES  Shrimp      REVIEW OF SYSTEMS  Review of Systems  Included in HPI  All systems reviewed and negative except for those discussed in HPI.      PHYSICAL EXAM    I have reviewed the triage vital signs and nursing notes.    ED Triage Vitals   Temp Heart Rate Resp BP SpO2   06/30/25 0926 06/30/25 0856 06/30/25 0856 06/30/25 0856 06/30/25 0856   98.2 °F (36.8 °C) 72 15 142/81 96 %      Temp src Heart Rate Source Patient Position BP Location FiO2 (%)   06/30/25 0926 06/30/25 0856 -- -- --   Oral Monitor          Physical Exam  GENERAL: alert, no acute distress  SKIN: Warm, dry, no rashes  HENT: Normocephalic, atraumatic  EYES: no scleral icterus, pupils equally round and reactive  CV: regular rhythm, regular rate  RESPIRATORY: normal effort, no stridor.  Breath sounds diminished at the bilateral bases.  Very few scattered rales noted.  No coughing.  ABDOMEN: soft, mild distention.  Mild tenderness diffusely in all quadrants.  No peritoneal features elicited  MUSCULOSKELETAL: no deformity.  Mild edema to the bilateral lower extremities at the feet and ankles.  NEURO: alert, moves all extremities, follows commands          LAB RESULTS  Recent Results (from the past 24 hours)   Comprehensive Metabolic Panel    Collection Time: 06/30/25  9:42 AM    Specimen: Blood   Result Value Ref Range    Glucose 137 (H) 65 - 99 mg/dL    BUN 8.0 8.0 - 23.0 mg/dL    Creatinine 0.81 0.76 - 1.27 mg/dL     Sodium 135 (L) 136 - 145 mmol/L    Potassium 5.0 3.5 - 5.2 mmol/L    Chloride 98 98 - 107 mmol/L    CO2 29.5 (H) 22.0 - 29.0 mmol/L    Calcium 9.1 8.6 - 10.5 mg/dL    Total Protein 7.3 6.0 - 8.5 g/dL    Albumin 3.9 3.5 - 5.2 g/dL    ALT (SGPT) 29 1 - 41 U/L    AST (SGOT) 31 1 - 40 U/L    Alkaline Phosphatase 78 39 - 117 U/L    Total Bilirubin 0.5 0.0 - 1.2 mg/dL    Globulin 3.4 gm/dL    A/G Ratio 1.1 g/dL    BUN/Creatinine Ratio 9.9 7.0 - 25.0    Anion Gap 7.5 5.0 - 15.0 mmol/L    eGFR 98.5 >60.0 mL/min/1.73   BNP    Collection Time: 06/30/25  9:42 AM    Specimen: Blood   Result Value Ref Range    proBNP 67.6 0.0 - 900.0 pg/mL   High Sensitivity Troponin T    Collection Time: 06/30/25  9:42 AM    Specimen: Blood   Result Value Ref Range    HS Troponin T 13 <22 ng/L   Lipase    Collection Time: 06/30/25  9:42 AM    Specimen: Blood   Result Value Ref Range    Lipase 16 13 - 60 U/L   CBC Auto Differential    Collection Time: 06/30/25  9:42 AM    Specimen: Blood   Result Value Ref Range    WBC 7.01 3.40 - 10.80 10*3/mm3    RBC 5.02 4.14 - 5.80 10*6/mm3    Hemoglobin 15.2 13.0 - 17.7 g/dL    Hematocrit 47.8 37.5 - 51.0 %    MCV 95.2 79.0 - 97.0 fL    MCH 30.3 26.6 - 33.0 pg    MCHC 31.8 31.5 - 35.7 g/dL    RDW 15.3 12.3 - 15.4 %    RDW-SD 53.7 37.0 - 54.0 fl    MPV 10.9 6.0 - 12.0 fL    Platelets 199 140 - 450 10*3/mm3    Neutrophil % 72.4 42.7 - 76.0 %    Lymphocyte % 14.8 (L) 19.6 - 45.3 %    Monocyte % 10.4 5.0 - 12.0 %    Eosinophil % 1.7 0.3 - 6.2 %    Basophil % 0.4 0.0 - 1.5 %    Immature Grans % 0.3 0.0 - 0.5 %    Neutrophils, Absolute 5.07 1.70 - 7.00 10*3/mm3    Lymphocytes, Absolute 1.04 0.70 - 3.10 10*3/mm3    Monocytes, Absolute 0.73 0.10 - 0.90 10*3/mm3    Eosinophils, Absolute 0.12 0.00 - 0.40 10*3/mm3    Basophils, Absolute 0.03 0.00 - 0.20 10*3/mm3    Immature Grans, Absolute 0.02 0.00 - 0.05 10*3/mm3    nRBC 0.0 0.0 - 0.2 /100 WBC   High Sensitivity Troponin T 1Hr    Collection Time: 06/30/25 11:33  AM    Specimen: Arm, Right; Blood   Result Value Ref Range    HS Troponin T 11 <22 ng/L    Troponin T Numeric Delta -2 Abnormal if >/=3 ng/L   ECG 12 Lead Dyspnea    Collection Time: 06/30/25 12:24 PM   Result Value Ref Range    QT Interval 426 ms    QTC Interval 422 ms         RADIOLOGY  CT Angiogram Chest Pulmonary Embolism  CT Angiogram Chest Pulmonary Embolism, CT Abdomen Pelvis With Contrast  Result Date: 6/30/2025  CT ANGIOGRAM CHEST PULMONARY EMBOLISM-, CT ABDOMEN PELVIS W CONTRAST-  INDICATION: Abdominal pain, distention. Concern for pulmonary embolism. Shortness of air.  COMPARISON: CT chest August 25, 2016 and CT abdomen pelvis March 14, 2025  TECHNIQUE: CTA chest with IV contrast. CT abdomen pelvis with contrast. Coronal and sagittal reformats. Three dimensional reconstructions. Radiation dose reduction techniques were utilized, including automated exposure control and exposure modulation based on body size.  FINDINGS:  Pulmonary arteries: Enlarged main pulmonary artery measuring 4.5 cm. Streak artifact from contrast in the venous system. Poor opacification of subsegmental pulmonary arteries. No pulmonary embolism identified.  Chest wall: Gynecomastia. No lymphadenopathy. Fatty atrophy of the left rotator cuff musculature and deltoid.  Mediastinum: Heart is at upper limits in size. No pericardial effusion. No mediastinal or hilar lymphadenopathy.  Lungs/pleura: No effusions. Airways wall thickening. Narrowing of central airways. Biapical pleural-parenchymal thickening. Posterior dependent and bibasilar subsegmental atelectasis. Calcified pulmonary nodule in the anterior basilar left lower lobe base, consistent with prior granulomatous infection. Scattered subsegmental atelectasis seen in the bilateral lungs. Small subpleural blebs and suspected air trapping seen in the right lower lobe.  ABDOMEN: Small hypoattenuating lesion seen along the capsule of segment 2/3 of the left hepatic lobe, series 2, axial  mage 32, measuring 1.4 cm, stable, with benign behavior, potential hemangioma. Possible steatosis or perfusional hypoattenuation along the gallbladder fossa. Normal gallbladder. Mild intrahepatic and extrahepatic biliary ductal dilatation, stable and chronic, with the common bile duct measuring 8 to 9 mm. Spleen is normal in size. No pancreatic mass or pancreatic ductal dilatation seen. No adrenal nodules. Small cyst seen in the superior pole right kidney. No solid-appearing renal mass or hydronephrosis. Lobular renal contours with some areas of cortical loss, suspect scarring.  Pelvis: Prostate is normal in size. Mild bladder distention. No bladder calculus.  Bowel: No small bowel obstruction. Moderate gas and stool seen in the colon. Normal appendix.  Abdominal wall: Rectus diastases. Small bowel containing Gomes type umbilical hernia, without obstruction.  Retroperitoneum: No lymphadenopathy.  Vasculature: Patent. No abdominal aortic aneurysm.  Osseous structures: No destructive osseous lesions. Suspected hemangioma in T7. Mild bilateral hip osteoarthritis. Lumbar facet degenerative arthropathy.       1. No pulmonary embolism identified. 2. Airways disease with thickened airway walls and multifocal subsegmental atelectasis suspected in the bilateral lungs. 3. Narrowing of central airways may indicate underlying tracheobronchomalacia. 4. Enlarged main pulmonary artery, can be seen with pulmonary artery hypertension. 5. No acute findings identified in the abdomen or pelvis. Nonemergent findings in the abdomen and pelvis above.  This report was finalized on 6/30/2025 1:21 PM by Dr. Noam Sanford M.D on Workstation: [id number]      XR Chest 1 View  Result Date: 6/30/2025  XR CHEST 1 VW-  HISTORY: Male who is 64 years-old, short of breath  TECHNIQUE: Frontal view of the chest  COMPARISON: 3/13/2025  FINDINGS: The heart size is borderline. Aorta is calcified. Pulmonary vasculature is mildly congested. Minimal  likely atelectasis at the right base. Small likely atelectasis or scarring at the left midlung. No pleural effusion, or pneumothorax, follow-up as indications persist. No acute osseous process.      As described.  This report was finalized on 6/30/2025 10:28 AM by Dr. Lalit Pete M.D on Workstation: [id number]          MEDICATIONS GIVEN IN ER  Medications   sodium chloride 0.9 % flush 10 mL (has no administration in time range)   acetaminophen (TYLENOL) tablet 650 mg (has no administration in time range)     Or   acetaminophen (TYLENOL) 160 MG/5ML oral solution 650 mg (has no administration in time range)     Or   acetaminophen (TYLENOL) suppository 650 mg (has no administration in time range)   ondansetron ODT (ZOFRAN-ODT) disintegrating tablet 4 mg (has no administration in time range)     Or   ondansetron (ZOFRAN) injection 4 mg (has no administration in time range)   melatonin tablet 2.5 mg (has no administration in time range)   sennosides-docusate (PERICOLACE) 8.6-50 MG per tablet 2 tablet (has no administration in time range)     And   polyethylene glycol (MIRALAX) packet 17 g (has no administration in time range)     And   bisacodyl (DULCOLAX) EC tablet 5 mg (has no administration in time range)     And   bisacodyl (DULCOLAX) suppository 10 mg (has no administration in time range)   iopamidol (ISOVUE-370) 76 % injection 100 mL (95 mL Intravenous Given 6/30/25 1241)         ORDERS PLACED DURING THIS VISIT:  Orders Placed This Encounter   Procedures    XR Chest 1 View    CT Angiogram Chest Pulmonary Embolism    CT Abdomen Pelvis With Contrast    Comprehensive Metabolic Panel    BNP    High Sensitivity Troponin T    Lipase    CBC Auto Differential    High Sensitivity Troponin T 1Hr    Basic Metabolic Panel    CBC (No Diff)    Diet: Cardiac; Healthy Heart (2-3 Na+); Fluid Consistency: Thin (IDDSI 0)    Monitor Blood Pressure    Continuous Pulse Oximetry    Vital Signs    Up with assistance    Daily Weights     Strict Intake & Output    Oral Care    Place Sequential Compression Device    Maintain Sequential Compression Device    Code Status and Medical Interventions: CPR (Attempt to Resuscitate); Full    [facility] (on-call MD unless specified) Details    Inpatient Pulmonology Consult    ECG 12 Lead Dyspnea    Insert Peripheral IV    Initiate Observation Status    CBC & Differential         OUTPATIENT MEDICATION MANAGEMENT:  Current Facility-Administered Medications Ordered in Epic   Medication Dose Route Frequency Provider Last Rate Last Admin    acetaminophen (TYLENOL) tablet 650 mg  650 mg Oral Q4H PRN Peace Rothman MD        Or    acetaminophen (TYLENOL) 160 MG/5ML oral solution 650 mg  650 mg Oral Q4H PRN Peace Rothman MD        Or    acetaminophen (TYLENOL) suppository 650 mg  650 mg Rectal Q4H PRN Lorna, Peace Boland MD        sennosides-docusate (PERICOLACE) 8.6-50 MG per tablet 2 tablet  2 tablet Oral BID PRN Lorna, Peace Boland MD        And    polyethylene glycol (MIRALAX) packet 17 g  17 g Oral Daily PRN Lorna, ePace Boland MD        And    bisacodyl (DULCOLAX) EC tablet 5 mg  5 mg Oral Daily PRN Lorna, Peace Boland MD        And    bisacodyl (DULCOLAX) suppository 10 mg  10 mg Rectal Daily PRN Stingbrian, Peace Boland MD        melatonin tablet 2.5 mg  2.5 mg Oral Nightly Stingbrian, Peace Boland MD        ondansetron ODT (ZOFRAN-ODT) disintegrating tablet 4 mg  4 mg Oral Q6H PRN Peace Rothman MD        Or    ondansetron (ZOFRAN) injection 4 mg  4 mg Intravenous Q6H PRN Peace Rothman MD        sodium chloride 0.9 % flush 10 mL  10 mL Intravenous PRN Devon Montez MD         Current Outpatient Medications Ordered in Epic   Medication Sig Dispense Refill    carvedilol (COREG) 3.125 MG tablet Take 1 tablet by mouth 2 (Two) Times a Day With Meals. 60 tablet 0    cloNIDine (CATAPRES) 0.1 MG tablet Take 1 tablet by mouth Every 12 (Twelve) Hours. 60 tablet 0    hydrOXYzine  (ATARAX) 50 MG tablet Take 1 tablet by mouth 3 (three) times a day as needed for itching. 21 tablet 0    metFORMIN (GLUCOPHAGE) 1000 MG tablet Take 1 tablet by mouth 2 (Two) Times a Day With Meals. 60 tablet 0    methadone (DOLOPHINE) 10 MG tablet Take 2 tablets by mouth 2 (Two) Times a Day,      polyethylene glycol (MIRALAX) 17 GM/SCOOP powder Take 17 g by mouth 2 (Two) Times a Day. Mix 1 capful in beverage as directed and drink twice daily. 1020 g 0    pravastatin (PRAVACHOL) 40 MG tablet Take 1 tablet by mouth Daily. 30 tablet 0    sennosides-docusate (PERICOLACE) 8.6-50 MG per tablet Take 2 tablets by mouth 2 (Two) Times a Day. 120 tablet 0    Zinc Sulfate 220 (50 Zn) MG tablet Take 220 mg by mouth.           PROCEDURES  Procedures        PROGRESS, DATA ANALYSIS, CONSULTS, AND MEDICAL DECISION MAKING  All labs have been independently interpreted by me.  All radiology studies have been reviewed by me. All EKG's have been independently viewed and interpreted by me.  Discussion below represents my analysis of pertinent findings related to patient's condition, differential diagnosis, treatment plan and final disposition.    Differential diagnosis includes but is not limited to CHF exacerbation, COPD exacerbation, pneumonia, pneumothorax, pancreatitis, bowel obstruction, ascites, pulmonary embolism, acute MI.    Clinical Scores:                                       ED Course as of 06/30/25 1622   Mon Jun 30, 2025   1154 proBNP: 67.6 [BART]   1154 I independently interpreted the chest x-ray and my findings are: Low lung volumes, no pneumothorax. [BART]   1154 HS Troponin T: 13 [BART]   1239 EKG         EKG time/Interp time: 1224/1228  Rhythm/Rate: Sinus rhythm, 59 bpm  P waves and [place]: Present, prolonged interval at 268 ms  QRS, axis: Narrow complex, normal axis, 93 ms  ST and T waves: No ST segment elevations.  No acute ischemic changes.  Nonspecific T wave flattening noted in multiple leads.  Independently interpreted by  me contemporaneously with treatment   [BART]   1341 I independently interpreted the CT angiogram chest study and my findings are: No pneumothorax, no pulmonary embolism [BART]   1507 I Discussed with Dr. Rothman from [facility] about the patient.  She agrees to admit him to the hospitalist service for further medical management today. [BART]      ED Course User Index  [BART] Devon Montez MD             AS OF 16:22 EDT VITALS:    BP - 124/88  HR - 58  TEMP - 98.3 °F (36.8 °C) (Oral)  O2 SATS - 96%    COMPLEXITY OF CARE  The patient requires admission.      DIAGNOSIS  Final diagnoses:   Dyspnea on exertion   Abdominal distention   Dizziness         DISPOSITION  ED Disposition       ED Disposition   Decision to Admit    Condition   --    Comment   Level of Care: Telemetry [5]   Diagnosis: Dyspnea on exertion [499979]   Admitting Physician: AIMEE ROTHMAN [[id number]]   Attending Physician: AIMEE ROTHMAN [[id number]]   Is patient appropriate for Inpatient Observation Unit?: Yes [1]                  Please note that portions of this document were completed with a voice recognition program.    Note Disclaimer: At [facility], we believe that sharing information builds trust and better relationships. You are receiving this note because you recently visited [facility]. It is possible you will see health information before a provider has talked with you about it. This kind of information can be easy to misunderstand. To help you fully understand what it means for your health, we urge you to discuss this note with your provider.         Devon Montez MD  06/30/25 6851

## 2025-06-30 NOTE — ED NOTES
Nursing report ED to floor  Kwame Andres  64 y.o.  male    HPI :  HPI  Stated Reason for Visit: abd swelling causing SOB x this mornign  History Obtained From: patient, EMS    Chief Complaint  Chief Complaint   Patient presents with    Shortness of Breath    Abdominal Pain       Admitting doctor:   Peace Rothman MD    Admitting diagnosis:   The primary encounter diagnosis was Dyspnea on exertion. Diagnoses of Abdominal distention and Dizziness were also pertinent to this visit.    Code status:   Current Code Status       Date Active Code Status Order ID Comments User Context       Prior            Allergies:   Shrimp    Isolation:   No active isolations    Intake and Output  No intake or output data in the 24 hours ending 06/30/25 1519    Weight:       06/30/25  0926   Weight: (!) 143 kg (315 lb)       Most recent vitals:   Vitals:    06/30/25 1300 06/30/25 1321 06/30/25 1410 06/30/25 1439   BP: 122/74 122/74 137/86 137/86   Pulse: 60 58 57 56   Resp:  10 16 17   Temp:    98.3 °F (36.8 °C)   TempSrc:    Oral   SpO2: 99% 97% 100% 99%   Weight:       Height:           Active LDAs/IV Access:   Lines, Drains & Airways       Active LDAs       Name Placement date Placement time Site Days    Peripheral IV 06/30/25 0920 20 G Right Antecubital 06/30/25  0920  Antecubital  less than 1                    Labs (abnormal labs have a star):   Labs Reviewed   COMPREHENSIVE METABOLIC PANEL - Abnormal; Notable for the following components:       Result Value    Glucose 137 (*)     Sodium 135 (*)     CO2 29.5 (*)     All other components within normal limits    Narrative:     GFR Categories in Chronic Kidney Disease (CKD)              GFR Category          GFR (mL/min/1.73)    Interpretation  G1                    90 or greater        Normal or high (1)  G2                    60-89                Mild decrease (1)  G3a                   45-59                Mild to moderate decrease  G3b                   30-44                 Moderate to severe decrease  G4                    15-29                Severe decrease  G5                    14 or less           Kidney failure    (1)In the absence of evidence of kidney disease, neither GFR category G1 or G2 fulfill the criteria for CKD.    eGFR calculation 2021 CKD-EPI creatinine equation, which does not include race as a factor   CBC WITH AUTO DIFFERENTIAL - Abnormal; Notable for the following components:    Lymphocyte % 14.8 (*)     All other components within normal limits   BNP (IN-HOUSE) - Normal    Narrative:     This assay is used as an aid in the diagnosis of individuals suspected of having heart failure. It can be used as an aid in the diagnosis of acute decompensated heart failure (ADHF) in patients presenting with signs and symptoms of ADHF to the emergency department (ED). In addition, NT-proBNP of <300 pg/mL indicates ADHF is not likely.    Age Range Result Interpretation  NT-proBNP Concentration (pg/mL:      <50             Positive            >450                   Gray                 300-450                    Negative             <300    50-75           Positive            >900                  Gray                300-900                  Negative            <300      >75             Positive            >1800                  Gray                300-1800                  Negative            <300   TROPONIN - Normal    Narrative:     High Sensitive Troponin T Reference Range:  <14.0 ng/L- Negative Female for AMI  <22.0 ng/L- Negative Male for AMI  >=14 - Abnormal Female indicating possible myocardial injury.  >=22 - Abnormal Male indicating possible myocardial injury.   Clinicians would have to utilize clinical acumen, EKG, Troponin, and serial changes to determine if it is an Acute Myocardial Infarction or myocardial injury due to an underlying chronic condition.        LIPASE - Normal   HIGH SENSITIVITIY TROPONIN T 1HR - Normal    Narrative:     High Sensitive Troponin T  Reference Range:  <14.0 ng/L- Negative Female for AMI  <22.0 ng/L- Negative Male for AMI  >=14 - Abnormal Female indicating possible myocardial injury.  >=22 - Abnormal Male indicating possible myocardial injury.   Clinicians would have to utilize clinical acumen, EKG, Troponin, and serial changes to determine if it is an Acute Myocardial Infarction or myocardial injury due to an underlying chronic condition.        CBC AND DIFFERENTIAL    Narrative:     The following orders were created for panel order CBC & Differential.  Procedure                               Abnormality         Status                     ---------                               -----------         ------                     CBC Auto Differential[[id number]]        Abnormal            Final result                 Please view results for these tests on the individual orders.       EKG:   ECG 12 Lead Dyspnea   Preliminary Result   HEART RATE=59  bpm   RR Gtvhdhqm=5050  ms   IL Wedhtstb=526  ms   P Horizontal Axis=22  deg   P Front Axis=38  deg   QRSD Interval=93  ms   QT Rrrchcys=117  ms   DLaP=295  ms   QRS Axis=16  deg   T Wave Axis=-10  deg   - ABNORMAL ECG -   Sinus rhythm   Prolonged IL interval   Borderline T abnormalities, inferior leads   Minimal ST elevation, anterior leads   When compared with ECG of 14-Mar-2025 05:19:52,   No significant change   Date and Time of Study:2025-06-30 12:24:50          Meds given in ED:   Medications   sodium chloride 0.9 % flush 10 mL (has no administration in time range)   iopamidol (ISOVUE-370) 76 % injection 100 mL (95 mL Intravenous Given 6/30/25 1241)       Imaging results:  CT Angiogram Chest Pulmonary Embolism  Result Date: 6/30/2025   1. No pulmonary embolism identified. 2. Airways disease with thickened airway walls and multifocal subsegmental atelectasis suspected in the bilateral lungs. 3. Narrowing of central airways may indicate underlying tracheobronchomalacia. 4. Enlarged main pulmonary artery,  can be seen with pulmonary artery hypertension. 5. No acute findings identified in the abdomen or pelvis. Nonemergent findings in the abdomen and pelvis above.  This report was finalized on 6/30/2025 1:21 PM by Dr. Noam Sanford M.D on Workstation: [id number]      CT Abdomen Pelvis With Contrast  Result Date: 6/30/2025   1. No pulmonary embolism identified. 2. Airways disease with thickened airway walls and multifocal subsegmental atelectasis suspected in the bilateral lungs. 3. Narrowing of central airways may indicate underlying tracheobronchomalacia. 4. Enlarged main pulmonary artery, can be seen with pulmonary artery hypertension. 5. No acute findings identified in the abdomen or pelvis. Nonemergent findings in the abdomen and pelvis above.  This report was finalized on 6/30/2025 1:21 PM by Dr. Noam Sanford M.D on Workstation: [id number]      XR Chest 1 View  Result Date: 6/30/2025  As described.  This report was finalized on 6/30/2025 10:28 AM by Dr. Lalit Pete M.D on Workstation: [id number]        Ambulatory status:   - x1-2 assist    Social issues:   Social History     Socioeconomic History    Marital status: Single   Tobacco Use    Smoking status: Never   Vaping Use    Vaping status: Never Used   Substance and Sexual Activity    Alcohol use: No    Drug use: No    Sexual activity: Defer       Peripheral Neurovascular  Peripheral Neurovascular (Adult)  Peripheral Neurovascular WDL: WDL    Neuro Cognitive  Neuro Cognitive (Adult)  Cognitive/Neuro/Behavioral WDL: WDL    Learning  Learning Assessment  Learning Readiness and Ability: no barriers identified    Respiratory  Respiratory WDL  Respiratory WDL: .WDL except, rhythm/pattern (2L NC baseline)  Rhythm/Pattern, Respiratory: shortness of breath (feels like he cant get a full breath x2 hrs)    Abdominal Pain       Pain Assessments  Pain (Adult)  (0-10) Pain Rating: Rest: 3  (0-10) Pain Rating: Activity: 5    NIH Stroke Scale       Lana Patel  RN  06/30/25 15:19 EDT

## 2025-06-30 NOTE — H&P
HISTORY AND PHYSICAL   [facility]        Date of Admission: 2025  Patient Identification:  Name: Kwame Andres  Age: 64 y.o.  Sex: male  :  1960  MRN: [id number]                     Primary Care Physician: Provider, No Known    Chief Complaint:  64 year old gentleman presented to the emergency room with shortness of breath, dizziness and abdominal distention which were present when he got up today; he denies chest pain; he denies sick contacts; symptoms are worse with exertion; he denies cough, fever or chills; he does not smoke    History of Present Illness:   As above    Past Medical History:  Past Medical History:   Diagnosis Date    Arthritis     Arthritis     bilateral knees    Chronic pain     Collapsed lung     with scar tissue    Diabetes mellitus     Herniated thoracic disc without myelopathy     by right scapula    Hypertension     Narcotic dependence     Pneumonia     Pneumonia due to COVID-19 virus     PTSD (post-traumatic stress disorder)     Torn Achilles tendon      Past Surgical History:  Past Surgical History:   Procedure Laterality Date    HIP SURGERY Right       Home Meds:  Medications Prior to Admission   Medication Sig Dispense Refill Last Dose/Taking    carvedilol (COREG) 3.125 MG tablet Take 1 tablet by mouth 2 (Two) Times a Day With Meals. 60 tablet 0 2025 Morning    cloNIDine (CATAPRES) 0.1 MG tablet Take 1 tablet by mouth Every 12 (Twelve) Hours. 60 tablet 0 2025 Morning    hydrOXYzine (ATARAX) 50 MG tablet Take 1 tablet by mouth 3 (three) times a day as needed for itching. 21 tablet 0 Past Month    metFORMIN (GLUCOPHAGE) 1000 MG tablet Take 1 tablet by mouth 2 (Two) Times a Day With Meals. 60 tablet 0 2025 Evening    methadone (DOLOPHINE) 10 MG tablet Take 2 tablets by mouth 2 (Two) Times a Day,   2025 Evening    polyethylene glycol (MIRALAX) 17 GM/SCOOP powder Take 17 g by mouth 2 (Two) Times a Day. Mix 1 capful in beverage as  "directed and drink twice daily. 1020 g 0 Past Week    pravastatin (PRAVACHOL) 40 MG tablet Take 1 tablet by mouth Daily. 30 tablet 0 2025 Evening    sennosides-docusate (PERICOLACE) 8.6-50 MG per tablet Take 2 tablets by mouth 2 (Two) Times a Day. 120 tablet 0 Past Week       Allergies:  Allergies   Allergen Reactions    Shrimp Other (See Comments)     \"break out in a sweat\", recent reaction     Immunizations:    There is no immunization history on file for this patient.  Social History:   Social History     Social History Narrative    Not on file     Social History     Socioeconomic History    Marital status: Single   Tobacco Use    Smoking status: Never    Smokeless tobacco: Never   Vaping Use    Vaping status: Never Used   Substance and Sexual Activity    Alcohol use: Not Currently    Drug use: Not Currently    Sexual activity: Defer       Family History:  Family History   Problem Relation Age of Onset    Cancer Mother     Hypertension Mother         Review of Systems  See history of present illness and past medical history.  Patient denies headache, dizziness, syncope, falls, trauma, change in vision, change in hearing, change in taste, changes in weight, changes in appetite, focal weakness, numbness, or paresthesia.  Patient denies chest pain, palpitations, dyspnea, orthopnea, PND, cough, sinus pressure, rhinorrhea, epistaxis, hemoptysis, nausea, vomiting,hematemesis, diarrhea, constipation or hematochezia.  Denies cold or heat intolerance, polydipsia, polyuria, polyphagia. Denies hematuria, pyuria, dysuria, hesitancy, frequency or urgency. Denies consumption of raw and under cooked meats foods or change in water source.  Denies fever, chills, sweats, night sweats.  Denies missing any routine medications. Remainder of ROS is negative.    Objective:  T Max 24 hrs: Temp (24hrs), Av.2 °F (36.8 °C), Min:98.1 °F (36.7 °C), Max:98.3 °F (36.8 °C)    Vitals Ranges:   Temp:  [98.1 °F (36.7 °C)-98.3 °F (36.8 " "°C)] 98.1 °F (36.7 °C)  Heart Rate:  [56-72] 61  Resp:  [10-18] 18  BP: (111-142)/(52-88) 115/75      Exam:  /75 (BP Location: Right arm, Patient Position: Sitting)   Pulse 61   Temp 98.1 °F (36.7 °C) (Oral)   Resp 18   Ht 188 cm (74\")   Wt (!) 143 kg (315 lb)   SpO2 96%   BMI 40.44 kg/m²     General Appearance:    Alert, cooperative, no distress, appears stated age   Head:    Normocephalic, without obvious abnormality, atraumatic   Eyes:    PERRL, conjunctivae/corneas clear, EOM's intact, both eyes   Ears:    Normal external ear canals, both ears   Nose:   Nares normal, septum midline, mucosa normal, no drainage    or sinus tenderness   Throat:   Lips, mucosa, and tongue normal   Neck:   Supple, symmetrical, trachea midline, no adenopathy;     thyroid:  no enlargement/tenderness/nodules; no carotid    bruit or JVD   Back:     Symmetric, no curvature, ROM normal, no CVA tenderness   Lungs:     Clear to auscultation bilaterally, respirations unlabored   Chest Wall:    No tenderness or deformity    Heart:    Regular rate and rhythm, S1 and S2 normal, no murmur, rub   or gallop   Abdomen:     Soft, nontender, bowel sounds active all four quadrants,     no masses, no hepatomegaly, no splenomegaly   Extremities:   Extremities normal, atraumatic, no cyanosis or edema        Data Review:  Labs in chart were reviewed.  WBC   Date Value Ref Range Status   06/30/2025 7.01 3.40 - 10.80 10*3/mm3 Final     Hemoglobin   Date Value Ref Range Status   06/30/2025 15.2 13.0 - 17.7 g/dL Final     Hematocrit   Date Value Ref Range Status   06/30/2025 47.8 37.5 - 51.0 % Final     Platelets   Date Value Ref Range Status   06/30/2025 199 140 - 450 10*3/mm3 Final     Sodium   Date Value Ref Range Status   06/30/2025 135 (L) 136 - 145 mmol/L Final     Potassium   Date Value Ref Range Status   06/30/2025 5.0 3.5 - 5.2 mmol/L Final     Comment:     Slight hemolysis detected by analyzer. Result may be falsely elevated. "     Chloride   Date Value Ref Range Status   06/30/2025 98 98 - 107 mmol/L Final     CO2   Date Value Ref Range Status   06/30/2025 29.5 (H) 22.0 - 29.0 mmol/L Final     BUN   Date Value Ref Range Status   06/30/2025 8.0 8.0 - 23.0 mg/dL Final     Creatinine   Date Value Ref Range Status   06/30/2025 0.81 0.76 - 1.27 mg/dL Final     Glucose   Date Value Ref Range Status   06/30/2025 137 (H) 65 - 99 mg/dL Final     Calcium   Date Value Ref Range Status   06/30/2025 9.1 8.6 - 10.5 mg/dL Final                Imaging Results (All)       Procedure Component Value Units Date/Time    CT Angiogram Chest Pulmonary Embolism [[id number]] Collected: 06/30/25 1251     Updated: 06/30/25 1324    Narrative:      CT ANGIOGRAM CHEST PULMONARY EMBOLISM-, CT ABDOMEN PELVIS W CONTRAST-     INDICATION: Abdominal pain, distention. Concern for pulmonary embolism.  Shortness of air.     COMPARISON: CT chest August 25, 2016 and CT abdomen pelvis March 14, 2025     TECHNIQUE:  CTA chest with IV contrast. CT abdomen pelvis with contrast. Coronal and  sagittal reformats. Three dimensional reconstructions. Radiation dose  reduction techniques were utilized, including automated exposure control  and exposure modulation based on body size.     FINDINGS:      Pulmonary arteries: Enlarged main pulmonary artery measuring 4.5 cm.  Streak artifact from contrast in the venous system. Poor opacification  of subsegmental pulmonary arteries. No pulmonary embolism identified.      Chest wall: Gynecomastia. No lymphadenopathy. Fatty atrophy of the left  rotator cuff musculature and deltoid.     Mediastinum: Heart is at upper limits in size. No pericardial effusion.  No mediastinal or hilar lymphadenopathy.     Lungs/pleura: No effusions. Airways wall thickening. Narrowing of  central airways. Biapical pleural-parenchymal thickening. Posterior  dependent and bibasilar subsegmental atelectasis. Calcified pulmonary  nodule in the anterior basilar left lower  lobe base, consistent with  prior granulomatous infection. Scattered subsegmental atelectasis seen  in the bilateral lungs. Small subpleural blebs and suspected air  trapping seen in the right lower lobe.     ABDOMEN: Small hypoattenuating lesion seen along the capsule of segment  2/3 of the left hepatic lobe, series 2, axial mage 32, measuring 1.4 cm,  stable, with benign behavior, potential hemangioma. Possible steatosis  or perfusional hypoattenuation along the gallbladder fossa. Normal  gallbladder. Mild intrahepatic and extrahepatic biliary ductal  dilatation, stable and chronic, with the common bile duct measuring 8 to  9 mm. Spleen is normal in size. No pancreatic mass or pancreatic ductal  dilatation seen. No adrenal nodules. Small cyst seen in the superior  pole right kidney. No solid-appearing renal mass or hydronephrosis.  Lobular renal contours with some areas of cortical loss, suspect  scarring.     Pelvis: Prostate is normal in size. Mild bladder distention. No bladder  calculus.     Bowel: No small bowel obstruction. Moderate gas and stool seen in the  colon. Normal appendix.     Abdominal wall: Rectus diastases. Small bowel containing Gomes type  umbilical hernia, without obstruction.     Retroperitoneum: No lymphadenopathy.     Vasculature: Patent. No abdominal aortic aneurysm.     Osseous structures: No destructive osseous lesions. Suspected hemangioma  in T7. Mild bilateral hip osteoarthritis. Lumbar facet degenerative  arthropathy.       Impression:         1. No pulmonary embolism identified.  2. Airways disease with thickened airway walls and multifocal  subsegmental atelectasis suspected in the bilateral lungs.  3. Narrowing of central airways may indicate underlying  tracheobronchomalacia.  4. Enlarged main pulmonary artery, can be seen with pulmonary artery  hypertension.  5. No acute findings identified in the abdomen or pelvis. Nonemergent  findings in the abdomen and pelvis above.      This report was finalized on 6/30/2025 1:21 PM by Dr. Noam Sanford M.D on Workstation: [id number]       CT Abdomen Pelvis With Contrast [[id number]] Collected: 06/30/25 1251     Updated: 06/30/25 1324    Narrative:      CT ANGIOGRAM CHEST PULMONARY EMBOLISM-, CT ABDOMEN PELVIS W CONTRAST-     INDICATION: Abdominal pain, distention. Concern for pulmonary embolism.  Shortness of air.     COMPARISON: CT chest August 25, 2016 and CT abdomen pelvis March 14, 2025     TECHNIQUE:  CTA chest with IV contrast. CT abdomen pelvis with contrast. Coronal and  sagittal reformats. Three dimensional reconstructions. Radiation dose  reduction techniques were utilized, including automated exposure control  and exposure modulation based on body size.     FINDINGS:      Pulmonary arteries: Enlarged main pulmonary artery measuring 4.5 cm.  Streak artifact from contrast in the venous system. Poor opacification  of subsegmental pulmonary arteries. No pulmonary embolism identified.      Chest wall: Gynecomastia. No lymphadenopathy. Fatty atrophy of the left  rotator cuff musculature and deltoid.     Mediastinum: Heart is at upper limits in size. No pericardial effusion.  No mediastinal or hilar lymphadenopathy.     Lungs/pleura: No effusions. Airways wall thickening. Narrowing of  central airways. Biapical pleural-parenchymal thickening. Posterior  dependent and bibasilar subsegmental atelectasis. Calcified pulmonary  nodule in the anterior basilar left lower lobe base, consistent with  prior granulomatous infection. Scattered subsegmental atelectasis seen  in the bilateral lungs. Small subpleural blebs and suspected air  trapping seen in the right lower lobe.     ABDOMEN: Small hypoattenuating lesion seen along the capsule of segment  2/3 of the left hepatic lobe, series 2, axial mage 32, measuring 1.4 cm,  stable, with benign behavior, potential hemangioma. Possible steatosis  or perfusional hypoattenuation along the gallbladder  fossa. Normal  gallbladder. Mild intrahepatic and extrahepatic biliary ductal  dilatation, stable and chronic, with the common bile duct measuring 8 to  9 mm. Spleen is normal in size. No pancreatic mass or pancreatic ductal  dilatation seen. No adrenal nodules. Small cyst seen in the superior  pole right kidney. No solid-appearing renal mass or hydronephrosis.  Lobular renal contours with some areas of cortical loss, suspect  scarring.     Pelvis: Prostate is normal in size. Mild bladder distention. No bladder  calculus.     Bowel: No small bowel obstruction. Moderate gas and stool seen in the  colon. Normal appendix.     Abdominal wall: Rectus diastases. Small bowel containing Gomes type  umbilical hernia, without obstruction.     Retroperitoneum: No lymphadenopathy.     Vasculature: Patent. No abdominal aortic aneurysm.     Osseous structures: No destructive osseous lesions. Suspected hemangioma  in T7. Mild bilateral hip osteoarthritis. Lumbar facet degenerative  arthropathy.       Impression:         1. No pulmonary embolism identified.  2. Airways disease with thickened airway walls and multifocal  subsegmental atelectasis suspected in the bilateral lungs.  3. Narrowing of central airways may indicate underlying  tracheobronchomalacia.  4. Enlarged main pulmonary artery, can be seen with pulmonary artery  hypertension.  5. No acute findings identified in the abdomen or pelvis. Nonemergent  findings in the abdomen and pelvis above.     This report was finalized on 6/30/2025 1:21 PM by Dr. Noam Sanford M.D on Workstation: [id number]       XR Chest 1 View [[id number]] Collected: 06/30/25 1024     Updated: 06/30/25 1031    Narrative:      XR CHEST 1 VW-     HISTORY: Male who is 64 years-old, short of breath     TECHNIQUE: Frontal view of the chest     COMPARISON: 3/13/2025     FINDINGS: The heart size is borderline. Aorta is calcified. Pulmonary  vasculature is mildly congested. Minimal likely atelectasis  at the right  base. Small likely atelectasis or scarring at the left midlung. No  pleural effusion, or pneumothorax, follow-up as indications persist. No  acute osseous process.       Impression:      As described.     This report was finalized on 6/30/2025 10:28 AM by Dr. Lalit Pete M.D on Workstation: [id number]                 Assessment:  Active Hospital Problems    Diagnosis  POA    **Dyspnea on exertion [R06.09]  Yes      Resolved Hospital Problems   No resolved problems to display.   Dizziness  Obesity  Diabetes  Opioid dependence  Acute hypoxic respiratory failure    Plan:  Cta noted  Ask pulmonary to see him  Add prn mininebs  Was seen by dr powell in march and had an echo with normal ef and diastolic dysfunction  Trend labs  Garo hartley, ed provider    Peace Rothman MD  6/30/2025  19:46 EDT

## 2025-06-30 NOTE — PLAN OF CARE
Problem: Adult Inpatient Plan of Care  Goal: Plan of Care Review  Outcome: Progressing  Goal: Patient-Specific Goal (Individualized)  Outcome: Progressing  Goal: Absence of Hospital-Acquired Illness or Injury  Outcome: Progressing  Goal: Optimal Comfort and Wellbeing  Outcome: Progressing  Goal: Readiness for Transition of Care  Outcome: Progressing  Intervention: Mutually Develop Transition Plan  Description: Identify available resources for support (e.g., family, friends, community).Identify and address barriers to ongoing treatment and home management (e.g., environmental, financial).Provide opportunities to practice self-management skills.Assess and monitor emotional readiness for transition.Establish or reconnect linkage with outpatient providers or community-based services.  Recent Flowsheet Documentation  Taken 6/30/2025 1727 by Dixie Hobbs RN  Transportation Anticipated: family or friend will provide  Patient/Family Anticipated Services at Transition: none  Patient/Family Anticipates Transition to: home with family  Taken 6/30/2025 1714 by Dixie Hobbs, RN  Equipment Currently Used at Home:   wheelchair   cane, straight   oxygen  Goal: Plan of Care Review  Outcome: Progressing  Goal: Patient-Specific Goal (Individualized)  Outcome: Progressing  Goal: Absence of Hospital-Acquired Illness or Injury  Outcome: Progressing  Goal: Optimal Comfort and Wellbeing  Outcome: Progressing  Goal: Readiness for Transition of Care  Outcome: Progressing  Intervention: Mutually Develop Transition Plan  Description: Identify available resources for support (e.g., family, friends, community).Identify and address barriers to ongoing treatment and home management (e.g., environmental, financial).Provide opportunities to practice self-management skills.Assess and monitor emotional readiness for transition.Establish or reconnect linkage with outpatient providers or community-based services.  Recent Flowsheet  Documentation  Taken 6/30/2025 1727 by Dixie Hobbs, RN  Transportation Anticipated: family or friend will provide  Patient/Family Anticipated Services at Transition: none  Patient/Family Anticipates Transition to: home with family  Taken 6/30/2025 1714 by Dixie Hobbs, RN  Equipment Currently Used at Home:   wheelchair   cane, straight   oxygen     Problem: Skin Injury Risk Increased  Goal: Skin Health and Integrity  Outcome: Progressing     Problem: Comorbidity Management  Goal: Maintenance of Asthma Control  Outcome: Progressing  Goal: Blood Glucose Level Within Target Range  Outcome: Progressing  Goal: Maintenance of Osteoarthritis Symptom Control  Outcome: Progressing     Problem: Fall Injury Risk  Goal: Absence of Fall and Fall-Related Injury  Outcome: Progressing     Problem: Pain Acute  Goal: Optimal Pain Control and Function  Outcome: Progressing   Goal Outcome Evaluation:            Pt arrived on the unit, all VSS

## 2025-07-01 LAB
ANION GAP SERPL CALCULATED.3IONS-SCNC: 9 MMOL/L (ref 5–15)
BUN SERPL-MCNC: 8 MG/DL (ref 8–23)
BUN/CREAT SERPL: 8.7 (ref 7–25)
CALCIUM SPEC-SCNC: 9 MG/DL (ref 8.6–10.5)
CHLORIDE SERPL-SCNC: 98 MMOL/L (ref 98–107)
CO2 SERPL-SCNC: 28 MMOL/L (ref 22–29)
CREAT SERPL-MCNC: 0.92 MG/DL (ref 0.76–1.27)
DEPRECATED RDW RBC AUTO: 52.2 FL (ref 37–54)
EGFRCR SERPLBLD CKD-EPI 2021: 92.9 ML/MIN/1.73
ERYTHROCYTE [DISTWIDTH] IN BLOOD BY AUTOMATED COUNT: 15.1 % (ref 12.3–15.4)
GLUCOSE BLDC GLUCOMTR-MCNC: 149 MG/DL (ref 70–130)
GLUCOSE BLDC GLUCOMTR-MCNC: 153 MG/DL (ref 70–130)
GLUCOSE BLDC GLUCOMTR-MCNC: 173 MG/DL (ref 70–130)
GLUCOSE BLDC GLUCOMTR-MCNC: 220 MG/DL (ref 70–130)
GLUCOSE SERPL-MCNC: 241 MG/DL (ref 65–99)
HCT VFR BLD AUTO: 46.9 % (ref 37.5–51)
HGB BLD-MCNC: 15.2 G/DL (ref 13–17.7)
MCH RBC QN AUTO: 30.5 PG (ref 26.6–33)
MCHC RBC AUTO-ENTMCNC: 32.4 G/DL (ref 31.5–35.7)
MCV RBC AUTO: 94.2 FL (ref 79–97)
PLATELET # BLD AUTO: 210 10*3/MM3 (ref 140–450)
PMV BLD AUTO: 10.6 FL (ref 6–12)
POTASSIUM SERPL-SCNC: 5.2 MMOL/L (ref 3.5–5.2)
RBC # BLD AUTO: 4.98 10*6/MM3 (ref 4.14–5.8)
SODIUM SERPL-SCNC: 135 MMOL/L (ref 136–145)
WBC NRBC COR # BLD AUTO: 8.56 10*3/MM3 (ref 3.4–10.8)

## 2025-07-01 PROCEDURE — 82948 REAGENT STRIP/BLOOD GLUCOSE: CPT

## 2025-07-01 PROCEDURE — 85027 COMPLETE CBC AUTOMATED: CPT | Performed by: INTERNAL MEDICINE

## 2025-07-01 PROCEDURE — 94760 N-INVAS EAR/PLS OXIMETRY 1: CPT

## 2025-07-01 PROCEDURE — 94799 UNLISTED PULMONARY SVC/PX: CPT

## 2025-07-01 PROCEDURE — 63710000001 INSULIN LISPRO (HUMAN) PER 5 UNITS: Performed by: INTERNAL MEDICINE

## 2025-07-01 PROCEDURE — G0378 HOSPITAL OBSERVATION PER HR: HCPCS

## 2025-07-01 PROCEDURE — 94664 DEMO&/EVAL PT USE INHALER: CPT

## 2025-07-01 PROCEDURE — 94640 AIRWAY INHALATION TREATMENT: CPT

## 2025-07-01 PROCEDURE — 63710000001 PREDNISONE PER 1 MG: Performed by: STUDENT IN AN ORGANIZED HEALTH CARE EDUCATION/TRAINING PROGRAM

## 2025-07-01 PROCEDURE — 80048 BASIC METABOLIC PNL TOTAL CA: CPT | Performed by: INTERNAL MEDICINE

## 2025-07-01 PROCEDURE — 94761 N-INVAS EAR/PLS OXIMETRY MLT: CPT

## 2025-07-01 RX ORDER — CLONAZEPAM 0.5 MG/1
0.5 TABLET ORAL 3 TIMES DAILY PRN
Status: DISCONTINUED | OUTPATIENT
Start: 2025-07-01 | End: 2025-07-03 | Stop reason: HOSPADM

## 2025-07-01 RX ORDER — LUBIPROSTONE 8 UG/1
24 CAPSULE ORAL 2 TIMES DAILY WITH MEALS
Status: DISCONTINUED | OUTPATIENT
Start: 2025-07-01 | End: 2025-07-03 | Stop reason: HOSPADM

## 2025-07-01 RX ADMIN — IPRATROPIUM BROMIDE AND ALBUTEROL SULFATE 3 ML: .5; 3 SOLUTION RESPIRATORY (INHALATION) at 19:36

## 2025-07-01 RX ADMIN — Medication 2.5 MG: at 21:58

## 2025-07-01 RX ADMIN — SENNOSIDES AND DOCUSATE SODIUM 2 TABLET: 50; 8.6 TABLET ORAL at 08:16

## 2025-07-01 RX ADMIN — SENNOSIDES AND DOCUSATE SODIUM 2 TABLET: 50; 8.6 TABLET ORAL at 21:58

## 2025-07-01 RX ADMIN — METHADONE HYDROCHLORIDE 20 MG: 10 TABLET ORAL at 08:16

## 2025-07-01 RX ADMIN — CLONIDINE HYDROCHLORIDE 0.1 MG: 0.1 TABLET ORAL at 08:16

## 2025-07-01 RX ADMIN — CARVEDILOL 3.12 MG: 3.12 TABLET, FILM COATED ORAL at 17:13

## 2025-07-01 RX ADMIN — IPRATROPIUM BROMIDE AND ALBUTEROL SULFATE 3 ML: .5; 3 SOLUTION RESPIRATORY (INHALATION) at 00:04

## 2025-07-01 RX ADMIN — INSULIN LISPRO 3 UNITS: 100 INJECTION, SOLUTION INTRAVENOUS; SUBCUTANEOUS at 11:52

## 2025-07-01 RX ADMIN — IPRATROPIUM BROMIDE AND ALBUTEROL SULFATE 3 ML: .5; 3 SOLUTION RESPIRATORY (INHALATION) at 16:48

## 2025-07-01 RX ADMIN — PREDNISONE 40 MG: 20 TABLET ORAL at 08:16

## 2025-07-01 RX ADMIN — POLYETHYLENE GLYCOL 3350 1 BOTTLE: 17 POWDER, FOR SOLUTION ORAL at 21:58

## 2025-07-01 RX ADMIN — IPRATROPIUM BROMIDE AND ALBUTEROL SULFATE 3 ML: .5; 3 SOLUTION RESPIRATORY (INHALATION) at 06:47

## 2025-07-01 RX ADMIN — CLONIDINE HYDROCHLORIDE 0.1 MG: 0.1 TABLET ORAL at 21:58

## 2025-07-01 RX ADMIN — METHADONE HYDROCHLORIDE 20 MG: 10 TABLET ORAL at 21:58

## 2025-07-01 RX ADMIN — CLONAZEPAM 0.5 MG: 0.5 TABLET ORAL at 16:23

## 2025-07-01 RX ADMIN — CARVEDILOL 3.12 MG: 3.12 TABLET, FILM COATED ORAL at 08:34

## 2025-07-01 RX ADMIN — LUBIPROSTONE 24 MCG: 8 CAPSULE, GELATIN COATED ORAL at 17:13

## 2025-07-01 RX ADMIN — INSULIN LISPRO 2 UNITS: 100 INJECTION, SOLUTION INTRAVENOUS; SUBCUTANEOUS at 08:17

## 2025-07-01 RX ADMIN — POLYETHYLENE GLYCOL 3350 1 BOTTLE: 17 POWDER, FOR SOLUTION ORAL at 08:18

## 2025-07-01 RX ADMIN — INSULIN LISPRO 2 UNITS: 100 INJECTION, SOLUTION INTRAVENOUS; SUBCUTANEOUS at 17:13

## 2025-07-01 RX ADMIN — PRAVASTATIN SODIUM 40 MG: 20 TABLET ORAL at 08:16

## 2025-07-01 NOTE — PLAN OF CARE
Goal Outcome Evaluation: No change.     Problem: Adult Inpatient Plan of Care  Goal: Absence of Hospital-Acquired Illness or Injury  Intervention: Identify and Manage Fall Risk  Recent Flowsheet Documentation  Taken 7/1/2025 1629 by Adali Vickers LPN  Safety Promotion/Fall Prevention:   assistive device/personal items within reach   clutter free environment maintained   nonskid shoes/slippers when out of bed   safety round/check completed  Taken 7/1/2025 1200 by Adali Vickers LPN  Safety Promotion/Fall Prevention:   assistive device/personal items within reach   clutter free environment maintained   nonskid shoes/slippers when out of bed   safety round/check completed  Taken 7/1/2025 1020 by Adali Vickers LPN  Safety Promotion/Fall Prevention:   assistive device/personal items within reach   clutter free environment maintained   nonskid shoes/slippers when out of bed   safety round/check completed  Taken 7/1/2025 0800 by Adali Vickers LPN  Safety Promotion/Fall Prevention:   activity supervised   assistive device/personal items within reach   clutter free environment maintained  Intervention: Prevent Skin Injury  Recent Flowsheet Documentation  Taken 7/1/2025 1629 by Adali Vickers LPN  Body Position: position changed independently  Taken 7/1/2025 1200 by Adali Vickers LPN  Body Position: position changed independently  Taken 7/1/2025 1020 by Adali Vickers LPN  Body Position: position changed independently  Taken 7/1/2025 0800 by Adali Vickers LPN  Body Position: position changed independently  Skin Protection: incontinence pads utilized  Intervention: Prevent Infection  Recent Flowsheet Documentation  Taken 7/1/2025 1629 by Adali Vickers LPN  Infection Prevention: single patient room provided  Taken 7/1/2025 1200 by Adali Vickers LPN  Infection Prevention:   single patient room provided   hand hygiene promoted  Taken 7/1/2025 0800 by Adali Vickers LPN  Infection Prevention: single patient room  provided  Goal: Optimal Comfort and Wellbeing  Intervention: Provide Person-Centered Care  Recent Flowsheet Documentation  Taken 7/1/2025 0800 by Adali Vickers LPN  Trust Relationship/Rapport: care explained  Goal: Absence of Hospital-Acquired Illness or Injury  Intervention: Identify and Manage Fall Risk  Recent Flowsheet Documentation  Taken 7/1/2025 1629 by Adali Vickers LPN  Safety Promotion/Fall Prevention:   assistive device/personal items within reach   clutter free environment maintained   nonskid shoes/slippers when out of bed   safety round/check completed  Taken 7/1/2025 1200 by Adali Vickers LPN  Safety Promotion/Fall Prevention:   assistive device/personal items within reach   clutter free environment maintained   nonskid shoes/slippers when out of bed   safety round/check completed  Taken 7/1/2025 1020 by Adali Vickers LPN  Safety Promotion/Fall Prevention:   assistive device/personal items within reach   clutter free environment maintained   nonskid shoes/slippers when out of bed   safety round/check completed  Taken 7/1/2025 0800 by Adali Vickers LPN  Safety Promotion/Fall Prevention:   activity supervised   assistive device/personal items within reach   clutter free environment maintained  Intervention: Prevent Skin Injury  Recent Flowsheet Documentation  Taken 7/1/2025 1629 by Adali Vickers LPN  Body Position: position changed independently  Taken 7/1/2025 1200 by Adali Vickers LPN  Body Position: position changed independently  Taken 7/1/2025 1020 by Adali Vickers LPN  Body Position: position changed independently  Taken 7/1/2025 0800 by Adali Vickers LPN  Body Position: position changed independently  Skin Protection: incontinence pads utilized  Intervention: Prevent Infection  Recent Flowsheet Documentation  Taken 7/1/2025 1629 by Adali Vickers LPN  Infection Prevention: single patient room provided  Taken 7/1/2025 1200 by Adali Vickers LPN  Infection Prevention:   single  patient room provided   hand hygiene promoted  Taken 7/1/2025 0800 by Adali Vickers LPN  Infection Prevention: single patient room provided  Goal: Optimal Comfort and Wellbeing  Intervention: Provide Person-Centered Care  Recent Flowsheet Documentation  Taken 7/1/2025 0800 by Adali Vickers LPN  Trust Relationship/Rapport: care explained

## 2025-07-01 NOTE — PROGRESS NOTES
Discharge Planning Assessment  [facility]     Patient Name: Kwame Andres  MRN: [id number]  Today's Date: 7/1/2025    Admit Date: 6/30/2025    Plan: Home with family no needs   Discharge Needs Assessment    No documentation.                  Discharge Plan       Row Name 07/01/25 1734       Plan    Plan Home with family no needs    Plan Comments Spoke with patient and sister Yordy at bedside for discharge planning.  Facesheet verified.   Yordy lives with him and is his caregiver.  Home DME includes a cane, walker, w/c, nebulizer and home oxygen from [facility] .  Patient stated he does not have a primary care doctor he stated that his  [facility]ha [phone] is in the process of obtaining a primary doctor for him. He requested an [place] from [facility] he stated the portable oxygen is too heavy. Message sent to [facility] and placed in [facility]. He will return home with sister Yordy. Maribeth OSHEA                  Continued Care and Services - Admitted Since 6/30/2025       Durable Medical Equipment       Service Provider Request Status Services Address Phone Fax Patient Preferred    [facility] Accepted -- [address] [place] [facility] [address] [phone] --                  Expected Discharge Date and Time       Expected Discharge Date Expected Discharge Time    Jul 2, 2025            Demographic Summary    No documentation.                  Functional Status    No documentation.                  Psychosocial    No documentation.                  Abuse/Neglect    No documentation.                  Legal    No documentation.                  Substance Abuse    No documentation.                  Patient Forms    No documentation.                     Mahsa Rosales, RN

## 2025-07-01 NOTE — PROGRESS NOTES
Consult Daily Progress Note  Psychiatric   07/01/25      Patient Name:  Kwame Andres  MRN:  [id number]   YOB: 1960  Age: 64 y.o.  Sex: male  LOS: 0    Reason for Consult:  Shortness of breath, bronchitis    Hospital Course:   64-year-old male with a history of chronic hypoxic respiratory failure, severe COVID-19 infection requiring intubation and prolonged hospitalization who presented with shortness of breath and found to have bronchitis.    Interval History:  Admitted yesterday for shortness of breath  Respiratory viral panel negative  2 L nasal cannula  States that breathing is improved with bronchodilator therapy  Does have a cough with mild sputum production  No chest pain or palpitations  Complaining of significant abdominal distention    Physical Exam:  Vitals:    07/01/25 0740   BP: 123/76   Pulse: 80   Resp:    Temp: 98.4 °F (36.9 °C)   SpO2: 90%       Intake/Output    Intake/Output Summary (Last 24 hours) at 7/1/2025 0953  Last data filed at 6/30/2025 1645  Gross per 24 hour   Intake 240 ml   Output --   Net 240 ml       General: Alert, nontoxic, NAD  HEENT: NC/AT, EOMI, MMM  Neck: Supple, trachea midline  Cardiac: RRR, no murmur, gallops, rubs  Pulmonary: Diminished bilaterally, no wheezes  GI: Mild distention, obese  Extremities: Warm, well perfused, no LE edema  Skin: no visible rash  Neuro: CN II - XII grossly intact  Psychiatry: Normal mood and affect      Data Review:  Results from last 7 days   Lab Units 07/01/25  0837 06/30/25  0942   WBC 10*3/mm3 8.56 7.01   HEMOGLOBIN g/dL 15.2 15.2   PLATELETS 10*3/mm3 210 199     Results from last 7 days   Lab Units 06/30/25  0942   SODIUM mmol/L 135*   POTASSIUM mmol/L 5.0   CHLORIDE mmol/L 98   CO2 mmol/L 29.5*   BUN mg/dL 8.0   CREATININE mg/dL 0.81   GLUCOSE mg/dL 137*   CALCIUM mg/dL 9.1   Estimated Creatinine Clearance: 135.5 mL/min (by C-G formula based on SCr of 0.81 mg/dL).    Results from last 7 days   Lab Units  07/01/25  0837 06/30/25  0942   AST (SGOT) U/L  --  31   ALT (SGPT) U/L  --  29   PLATELETS 10*3/mm3 210 199             Result Review:  I have personally reviewed the results from the time of this admission to 7/1/2025 09:53 EDT and agree with these findings:  [x]  Laboratory list / accordion  [x]  Microbiology  [x]  Radiology  []  EKG/Telemetry   [x]  Cardiology/Vascular   []  Pathology  [x]  Old records  []  Other:    ASSESSMENT  /  PLAN:    Dyspnea  Bronchitis on imaging  Severe COVID in October 2021 requiring intubation  Chronic hypoxic respiratory failure on 2-3 L nasal cannula  Type 2 diabetes mellitus  Hypertension  Anxiety  Morbid obesity  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  Does not follow with pulmonary.  Will need to follow-up as an outpatient.  - Unclear etiology to abdominal distention, CT abdomen pelvis was unremarkable, echocardiogram from March 2025 with preserved EF, mild to moderate concentric hypertrophy, RVSP normal, LFTs unremarkable, renal function normal.    All issues new to me today.  Prior hospital course, labs and imaging reviewed.    Thank you for allowing us to participate in this patients care. Pulmonary will continue to follow.     Rebel Yadav MD  [facility]  Pulmonary and Critical Care Medicine, Interventional Pulmonology    Parts of this note may be an electronic transcription/translation of spoken language to printed text using the Dragon dictation system.

## 2025-07-01 NOTE — CONSULTS
[facility]  [phone]  Dr. Santiago Chilel      Subjective   LOS: 0 days     Mr. Andres is a 64-year-old gentleman with past medical history of chronic hypoxic respiratory failure on 2 to 3 L nasal cannula, hypertension, type 2 diabetes mellitus, arthritis, chronic pain, constipation and severe COVID requiring intubation and prolonged hospitalization. Has history of trauma to chest in 2010 with collapsed lung requiring right chest tube. Also had hospitalization in October 2021 for severe covid that required several days of intubation, since discharge he has been been on oxygen 2-3 L nasal cannula.  He had never saw pulmonology after this hospitalization but he does have a pending outpatient consultation at [facility] in August 2025.  He presented to the [facility] ED on 6/30/2025 with acute shortness of breath, dizziness and abdominal distention.  He had awoken on this morning with significant shortness of breath and dizziness.  Also could feel that his abdomen was slightly more swollen than usual.  Denied other concerns or complaints.  States that his primary physician did not recently refill his Klonopin therefore he has been out with of this medication and felt that this could have some component of anxiety.  However given the severity of his symptoms he did seek care in the ER.  Workup in the ER with CTA chest showed no PE but did show significant airway wall thickening and airways disease with multifocal areas of atelectasis/scarring in the bilateral lungs.  Also with some evidence of possible tracheobronchomalacia.    Kwame Andres  reports that he does not currently use alcohol.,  reports that he has never smoked. He has never used smokeless tobacco.     Past Hx:  has a past medical history of Arthritis, Arthritis (2000), Chronic pain, Collapsed lung (2010), Diabetes mellitus, Herniated thoracic disc without myelopathy, Hypertension, Narcotic dependence, Pneumonia, Pneumonia  "due to COVID-19 virus (), PTSD (post-traumatic stress disorder), and Torn Achilles tendon.  Surg Hx:  has a past surgical history that includes Hip surgery (Right).  FH: family history includes Cancer in his mother; Hypertension in his mother.  SH:  reports that he has never smoked. He has never used smokeless tobacco. He reports that he does not currently use alcohol. He reports that he does not currently use drugs.    Medications Prior to Admission   Medication Sig Dispense Refill Last Dose/Taking    carvedilol (COREG) 3.125 MG tablet Take 1 tablet by mouth 2 (Two) Times a Day With Meals. 60 tablet 0 2025 Morning    cloNIDine (CATAPRES) 0.1 MG tablet Take 1 tablet by mouth Every 12 (Twelve) Hours. 60 tablet 0 2025 Morning    hydrOXYzine (ATARAX) 50 MG tablet Take 1 tablet by mouth 3 (three) times a day as needed for itching. 21 tablet 0 Past Month    metFORMIN (GLUCOPHAGE) 1000 MG tablet Take 1 tablet by mouth 2 (Two) Times a Day With Meals. 60 tablet 0 2025 Evening    methadone (DOLOPHINE) 10 MG tablet Take 2 tablets by mouth 2 (Two) Times a Day,   2025 Evening    polyethylene glycol (MIRALAX) 17 GM/SCOOP powder Take 17 g by mouth 2 (Two) Times a Day. Mix 1 capful in beverage as directed and drink twice daily. 1020 g 0 Past Week    pravastatin (PRAVACHOL) 40 MG tablet Take 1 tablet by mouth Daily. 30 tablet 0 2025 Evening    sennosides-docusate (PERICOLACE) 8.6-50 MG per tablet Take 2 tablets by mouth 2 (Two) Times a Day. 120 tablet 0 Past Week     Allergies   Allergen Reactions    Shrimp Other (See Comments)     \"break out in a sweat\", recent reaction       Review of Systems   All other systems reviewed and are negative.    Vital Signs past 24hrs  BP range: BP: (111-142)/(52-88) 112/80  Pulse range: Heart Rate:  [56-72] 64  Resp rate range: Resp:  [10-18] 18  Temp range: Temp (24hrs), Av.2 °F (36.8 °C), Min:98.1 °F (36.7 °C), Max:98.3 °F (36.8 °C)    Oxygen range: SpO2:  [96 " %-100 %] 97 %; Flow (L/min) (Oxygen Therapy):  [2] 2;   Device (Oxygen Therapy): nasal cannula  (!) 143 kg (315 lb); Body mass index is 40.44 kg/m².  Net IO Since Admission: 240 mL [06/30/25 2053]        Mechanical Ventilator:     Physical Exam  Vitals and nursing note reviewed.   Constitutional:       General: He is not in acute distress.     Appearance: Normal appearance.   HENT:      Head: Normocephalic and atraumatic.   Eyes:      Extraocular Movements: Extraocular movements intact.      Conjunctiva/sclera: Conjunctivae normal.      Pupils: Pupils are equal, round, and reactive to light.   Cardiovascular:      Rate and Rhythm: Normal rate and regular rhythm.      Heart sounds: No murmur heard.     No friction rub. No gallop.   Pulmonary:      Effort: Pulmonary effort is normal. No respiratory distress.      Breath sounds: Decreased air movement present. Decreased breath sounds present. No wheezing, rhonchi or rales.   Abdominal:      General: Abdomen is flat.      Palpations: Abdomen is soft.      Tenderness: There is no abdominal tenderness.   Musculoskeletal:         General: No swelling or tenderness. Normal range of motion.   Skin:     General: Skin is warm and dry.      Findings: No rash.   Neurological:      General: No focal deficit present.      Mental Status: He is alert and oriented to person, place, and time.   Psychiatric:         Mood and Affect: Mood normal.         Behavior: Behavior normal.         Results Review:    I have reviewed the laboratory and imaging data from current admission. My annotations are as noted in assessment and plan.  Result Review:  I have personally reviewed the results from the time of this admission to 6/30/2025 20:53 EDT and agree with these findings:  [x]  Laboratory list / accordion  [x]  Microbiology  [x]  Radiology  [x]  EKG/Telemetry   [x]  Cardiology/Vascular   [x]  Pathology  [x]  Old records  []  Other:      Medication Review:  I have reviewed the current MAR.  My annotations are as noted in assessment and plan.       Lines, Drains & Airways       Active LDAs       Name Placement date Placement time Site Days    Peripheral IV 06/30/25 0920 20 G Right Antecubital 06/30/25  0920  Antecubital  less than 1                  Diet Orders (active) (From admission, onward)       Start     Ordered    06/30/25 1522  Diet: Cardiac; Healthy Heart (2-3 Na+); Fluid Consistency: Thin (IDDSI 0)  Diet Effective Now         06/30/25 1521                  No active isolations    Assessment  Dyspnea  Bronchitis on imaging  Severe COVID in October 2021 requiring intubation  Chronic hypoxic respiratory failure on 2-3 L nasal cannula  Type 2 diabetes mellitus  Hypertension  Anxiety    Plan  -Patient presented on 6/30/2025 with acute onset shortness of breath and dizziness.  Found to have airway wall thickening on CTA chest.  - Would wean oxygen for goal to saturation greater than 90%.  - check respiratory viral panel  - start scheduled and as needed bronchodilators  - give 5-day course of steroids  - Patient will obviously need outpatient follow-up with PFTs which she can either do through our office or [facility]  - Pulmonary to continue following    Electronically signed by Santiago Chilel DO, 06/30/25, 8:53 PM EDT.      Part of this note may be an electronic transcription/translation of spoken language to printed text using the Dragon Dictation System.

## 2025-07-01 NOTE — SIGNIFICANT NOTE
07/01/25 1318   OTHER   Discipline physical therapist   Rehab Time/Intention   Session Not Performed patient/family declined, not feeling well  (Pt reports abd pain/distention, reports started on miralax and hoping to have BM. encouraged ambulation, pt requested to follow up tmrw.)   Recommendation   PT - Next Appointment 07/02/25

## 2025-07-01 NOTE — PROGRESS NOTES
Name: Kwame Andres ADMIT: 2025   : 1960  PCP: Provider, No Known    MRN: [id number] LOS: 0 days   AGE/SEX: 64 y.o. male  ROOM: S5/1     Subjective   Subjective   He is laying in bed, very anxious, worried about getting  enough Klonopin. He reports his shortness of breath seems better, but the breathing treatment are causing his heart rate, to go up and his anxiety to be worse..  He also reports intermittent hot flashes.  That is not uncommon for him when he is short of breath.  His abdomen is distended, he states he woke up yesterday morning and noticed his abdomen was much more swollen than normal.  He also reports that he has been out of his Klonopin, due to not getting into see his primary care provider for refill on his,   He otherwise offers no new complaints.  Family is currently at bedside.       Objective   Objective   Vital Signs  Temp:  [97.7 °F (36.5 °C)-98.4 °F (36.9 °C)] 98.4 °F (36.9 °C)  Heart Rate:  [58-84] 84  Resp:  [11-18] 16  BP: (112-132)/(75-93) 132/93  SpO2:  [90 %-98 %] 95 %  on  Flow (L/min) (Oxygen Therapy):  [2] 2;   Device (Oxygen Therapy): nasal cannula  Body mass index is 38.61 kg/m².  Physical Exam  Vitals reviewed.   Constitutional:       Appearance: He is obese. He is ill-appearing.   HENT:      Mouth/Throat:      Mouth: Mucous membranes are dry.   Eyes:      Conjunctiva/sclera: Conjunctivae normal.   Cardiovascular:      Rate and Rhythm: Regular rhythm. Tachycardia present.      Pulses: Normal pulses.           Radial pulses are 2+ on the right side and 2+ on the left side.      Heart sounds: Normal heart sounds.   Pulmonary:      Effort: Pulmonary effort is normal.      Breath sounds: Examination of the right-lower field reveals wheezing. Examination of the left-lower field reveals wheezing. Wheezing present.   Abdominal:      General: Bowel sounds are normal. There is distension.      Palpations: Abdomen is soft.      Tenderness: There is abdominal tenderness.    Musculoskeletal:      Right lower leg: No edema.      Left lower leg: No edema.   Skin:     General: Skin is dry.      Capillary Refill: Capillary refill takes less than 2 seconds.   Neurological:      General: No focal deficit present.      Mental Status: He is alert.   Psychiatric:         Mood and Affect: Mood is anxious.       Results Review     I reviewed the patient's new clinical results.  Results from last 7 days   Lab Units 07/01/25  0837 06/30/25  0942   WBC 10*3/mm3 8.56 7.01   HEMOGLOBIN g/dL 15.2 15.2   PLATELETS 10*3/mm3 210 199     Results from last 7 days   Lab Units 07/01/25  0837 06/30/25  0942   SODIUM mmol/L 135* 135*   POTASSIUM mmol/L 5.2 5.0   CHLORIDE mmol/L 98 98   CO2 mmol/L 28.0 29.5*   BUN mg/dL 8.0 8.0   CREATININE mg/dL 0.92 0.81   GLUCOSE mg/dL 241* 137*   EGFR mL/min/1.73 92.9 98.5     Results from last 7 days   Lab Units 06/30/25  0942   ALBUMIN g/dL 3.9   BILIRUBIN mg/dL 0.5   ALK PHOS U/L 78   AST (SGOT) U/L 31   ALT (SGPT) U/L 29     Results from last 7 days   Lab Units 07/01/25  0837 06/30/25  0942   CALCIUM mg/dL 9.0 9.1   ALBUMIN g/dL  --  3.9       Glucose   Date/Time Value Ref Range Status   07/01/2025 1018 220 (H) 70 - 130 mg/dL Final   07/01/2025 0558 173 (H) 70 - 130 mg/dL Final   06/30/2025 2051 131 (H) 70 - 130 mg/dL Final       CT Angiogram Chest Pulmonary Embolism  Result Date: 6/30/2025   1. No pulmonary embolism identified. 2. Airways disease with thickened airway walls and multifocal subsegmental atelectasis suspected in the bilateral lungs. 3. Narrowing of central airways may indicate underlying tracheobronchomalacia. 4. Enlarged main pulmonary artery, can be seen with pulmonary artery hypertension. 5. No acute findings identified in the abdomen or pelvis. Nonemergent findings in the abdomen and pelvis above.  This report was finalized on 6/30/2025 1:21 PM by Dr. Noam Sanford M.D on Workstation: [id number]      CT Abdomen Pelvis With Contrast  Result Date:  6/30/2025   1. No pulmonary embolism identified. 2. Airways disease with thickened airway walls and multifocal subsegmental atelectasis suspected in the bilateral lungs. 3. Narrowing of central airways may indicate underlying tracheobronchomalacia. 4. Enlarged main pulmonary artery, can be seen with pulmonary artery hypertension. 5. No acute findings identified in the abdomen or pelvis. Nonemergent findings in the abdomen and pelvis above.  This report was finalized on 6/30/2025 1:21 PM by Dr. Noam Sanford M.D on Workstation: [id number]      XR Chest 1 View  Result Date: 6/30/2025  As described.  This report was finalized on 6/30/2025 10:28 AM by Dr. Lalit Pete M.D on Workstation: [id number]        I have personally reviewed all medications:  Scheduled Medications  carvedilol, 3.125 mg, Oral, BID With Meals  cloNIDine, 0.1 mg, Oral, Q12H  insulin lispro, 2-7 Units, Subcutaneous, 4x Daily AC & at Bedtime  ipratropium-albuterol, 3 mL, Nebulization, 4x Daily - RT  melatonin, 2.5 mg, Oral, Nightly  methadone, 20 mg, Oral, BID  polyethylene glycol, 1 bottle, Oral, BID  pravastatin, 40 mg, Oral, Daily  predniSONE, 40 mg, Oral, Daily With Breakfast  sennosides-docusate, 2 tablet, Oral, BID    Infusions   Diet  Diet: Cardiac; Healthy Heart (2-3 Na+); Fluid Consistency: Thin (IDDSI 0)    I have personally reviewed:  [x]  Laboratory   [x]  Microbiology   [x]  Radiology   [x]  EKG/Telemetry  [x]  Cardiology/Vascular   []  Pathology    []  Records       Assessment/Plan     Active Hospital Problems    Diagnosis  POA    **Dyspnea on exertion [R06.09]  Yes    Slow transit constipation [K59.01]  Yes    Polysubstance abuse [F19.10]  Yes    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       64 y.o. male admitted with Dyspnea on exertion.  Associated dizziness  With acute hypoxic respiratory failure requiring 2 to 3 L via nasal cannula.  Send for ongoing shortness of breath, and respiratory issues since marisol COVID-19 in 2021.-Unfortunately he has not followed up with pulmonology since.  This hospitalization he did require mechanical ventilation.  Pulmonology appreciate their assistance and recommendations  CTA ruled out PE - but showed significant airway wall thickening and airway disease with multiple areas of atelctasis/scarring of bilateral lungs, possible tracheobronchomalacia.   Continue as needed nebulizers for shortness of breath.  Supplemental oxygen as needed for hypoxia/respiratory distress to maintain oxygen saturation greater than 90%.  Encourage pulmonary hygiene, comfortably, incentive spirometry  Dizziness has resolved    Anxiety  PTSD  Continue home Klonopin-  3 times daily as stated    Chronic pain  History of polysubstance abuse  Opioid use disorder,  Methadone dependence  Drug seeking behavior   Continue home methadone  Avoid opioids     Slow Transit constipation  With abdominal distention  He reports multiple weeks since his family has been-at that time that required  bowel prep-with Dulcolax, and MiraLAX to move his bowels.  He declines prior episodes of constipation  Continue as needed bowel management  Soapsuds enema as needed for constipation ordered  If no BM with current regimen consider GI consult    Type 2 diabetes mellitus  Chronic  He is hyperglycemic  Not unexpected oral steroids  Most recent hemoglobin A1c 7.40 on 3/14/2025  Accu-Cheks ACHS  SSI ordered for hyperglycemia  Hypoglycemia treatment protocol.         SCDs for DVT prophylaxis.  Full code.  Discussed with patient, family, and care team on multidisciplinary rounds.  Anticipate discharge home timing yet to be  determined.  Expected discharge date/ time has not been documented.      GRZEGORZ Reyes  [facility]  07/01/25  15:39 EDT

## 2025-07-02 LAB
ANION GAP SERPL CALCULATED.3IONS-SCNC: 9.2 MMOL/L (ref 5–15)
BUN SERPL-MCNC: 9 MG/DL (ref 8–23)
BUN/CREAT SERPL: 10.1 (ref 7–25)
CALCIUM SPEC-SCNC: 8.9 MG/DL (ref 8.6–10.5)
CHLORIDE SERPL-SCNC: 98 MMOL/L (ref 98–107)
CO2 SERPL-SCNC: 27.8 MMOL/L (ref 22–29)
CREAT SERPL-MCNC: 0.89 MG/DL (ref 0.76–1.27)
DEPRECATED RDW RBC AUTO: 53.3 FL (ref 37–54)
EGFRCR SERPLBLD CKD-EPI 2021: 95.7 ML/MIN/1.73
ERYTHROCYTE [DISTWIDTH] IN BLOOD BY AUTOMATED COUNT: 15.8 % (ref 12.3–15.4)
GLUCOSE BLDC GLUCOMTR-MCNC: 137 MG/DL (ref 70–130)
GLUCOSE BLDC GLUCOMTR-MCNC: 141 MG/DL (ref 70–130)
GLUCOSE BLDC GLUCOMTR-MCNC: 141 MG/DL (ref 70–130)
GLUCOSE BLDC GLUCOMTR-MCNC: 142 MG/DL (ref 70–130)
GLUCOSE BLDC GLUCOMTR-MCNC: 154 MG/DL (ref 70–130)
GLUCOSE SERPL-MCNC: 149 MG/DL (ref 65–99)
HCT VFR BLD AUTO: 46 % (ref 37.5–51)
HGB BLD-MCNC: 15 G/DL (ref 13–17.7)
MCH RBC QN AUTO: 30.2 PG (ref 26.6–33)
MCHC RBC AUTO-ENTMCNC: 32.6 G/DL (ref 31.5–35.7)
MCV RBC AUTO: 92.7 FL (ref 79–97)
PLATELET # BLD AUTO: 210 10*3/MM3 (ref 140–450)
PMV BLD AUTO: 11.1 FL (ref 6–12)
POTASSIUM SERPL-SCNC: 4.2 MMOL/L (ref 3.5–5.2)
RBC # BLD AUTO: 4.96 10*6/MM3 (ref 4.14–5.8)
SODIUM SERPL-SCNC: 135 MMOL/L (ref 136–145)
WBC NRBC COR # BLD AUTO: 9.7 10*3/MM3 (ref 3.4–10.8)

## 2025-07-02 PROCEDURE — 82948 REAGENT STRIP/BLOOD GLUCOSE: CPT

## 2025-07-02 PROCEDURE — 94799 UNLISTED PULMONARY SVC/PX: CPT

## 2025-07-02 PROCEDURE — 94664 DEMO&/EVAL PT USE INHALER: CPT

## 2025-07-02 PROCEDURE — 80048 BASIC METABOLIC PNL TOTAL CA: CPT | Performed by: NURSE PRACTITIONER

## 2025-07-02 PROCEDURE — 94761 N-INVAS EAR/PLS OXIMETRY MLT: CPT

## 2025-07-02 PROCEDURE — 63710000001 INSULIN LISPRO (HUMAN) PER 5 UNITS: Performed by: INTERNAL MEDICINE

## 2025-07-02 PROCEDURE — 63710000001 PREDNISONE PER 1 MG: Performed by: STUDENT IN AN ORGANIZED HEALTH CARE EDUCATION/TRAINING PROGRAM

## 2025-07-02 PROCEDURE — 85027 COMPLETE CBC AUTOMATED: CPT | Performed by: NURSE PRACTITIONER

## 2025-07-02 PROCEDURE — G0378 HOSPITAL OBSERVATION PER HR: HCPCS

## 2025-07-02 PROCEDURE — 94762 N-INVAS EAR/PLS OXIMTRY CONT: CPT

## 2025-07-02 RX ADMIN — CLONAZEPAM 0.5 MG: 0.5 TABLET ORAL at 04:02

## 2025-07-02 RX ADMIN — CLONAZEPAM 0.5 MG: 0.5 TABLET ORAL at 20:12

## 2025-07-02 RX ADMIN — METHADONE HYDROCHLORIDE 20 MG: 10 TABLET ORAL at 20:12

## 2025-07-02 RX ADMIN — Medication 2.5 MG: at 20:11

## 2025-07-02 RX ADMIN — CARVEDILOL 3.12 MG: 3.12 TABLET, FILM COATED ORAL at 09:07

## 2025-07-02 RX ADMIN — LUBIPROSTONE 24 MCG: 8 CAPSULE, GELATIN COATED ORAL at 18:23

## 2025-07-02 RX ADMIN — SENNOSIDES AND DOCUSATE SODIUM 2 TABLET: 50; 8.6 TABLET ORAL at 20:11

## 2025-07-02 RX ADMIN — SENNOSIDES AND DOCUSATE SODIUM 2 TABLET: 50; 8.6 TABLET ORAL at 09:07

## 2025-07-02 RX ADMIN — PRAVASTATIN SODIUM 40 MG: 20 TABLET ORAL at 09:07

## 2025-07-02 RX ADMIN — CLONIDINE HYDROCHLORIDE 0.1 MG: 0.1 TABLET ORAL at 09:07

## 2025-07-02 RX ADMIN — INSULIN LISPRO 2 UNITS: 100 INJECTION, SOLUTION INTRAVENOUS; SUBCUTANEOUS at 11:41

## 2025-07-02 RX ADMIN — CLONIDINE HYDROCHLORIDE 0.1 MG: 0.1 TABLET ORAL at 20:11

## 2025-07-02 RX ADMIN — LUBIPROSTONE 24 MCG: 8 CAPSULE, GELATIN COATED ORAL at 09:06

## 2025-07-02 RX ADMIN — PREDNISONE 40 MG: 20 TABLET ORAL at 09:07

## 2025-07-02 RX ADMIN — CARVEDILOL 3.12 MG: 3.12 TABLET, FILM COATED ORAL at 18:23

## 2025-07-02 RX ADMIN — IPRATROPIUM BROMIDE AND ALBUTEROL SULFATE 3 ML: .5; 3 SOLUTION RESPIRATORY (INHALATION) at 15:18

## 2025-07-02 RX ADMIN — METHADONE HYDROCHLORIDE 20 MG: 10 TABLET ORAL at 09:07

## 2025-07-02 RX ADMIN — IPRATROPIUM BROMIDE AND ALBUTEROL SULFATE 3 ML: .5; 3 SOLUTION RESPIRATORY (INHALATION) at 07:58

## 2025-07-02 NOTE — PROGRESS NOTES
Name: Kwame Andres ADMIT: 2025   : 1960  PCP: Provider, No Known    MRN: [id number] LOS: 0 days   AGE/SEX: 64 y.o. male  ROOM: [facility]/     Subjective   Subjective   Shortness of breath continues to improve.  Positive occasional wheezing.  No cough.  No hemoptysis.  No fever or chills.  No chest pain.  No ankle edema.  No palpitation.    Review of Systems  GI.  Improved abdominal pain.  Positive bowel movement without fresh bright blood per rectum or melena.  No nausea or vomiting  .  No dysuria or hematuria.     Objective   Objective   Vital Signs  Temp:  [97.5 °F (36.4 °C)-98.6 °F (37 °C)] 97.5 °F (36.4 °C)  Heart Rate:  [66-85] 66  Resp:  [16-18] 16  BP: (112-126)/(71-82) 124/82  SpO2:  [93 %-99 %] 99 %  on  Flow (L/min) (Oxygen Therapy):  [2] 2;   Device (Oxygen Therapy): nasal cannula    Intake/Output Summary (Last 24 hours) at 2025 1217  Last data filed at 2025 0800  Gross per 24 hour   Intake 600 ml   Output --   Net 600 ml     Body mass index is 38.89 kg/m².      25  0926 25  0549 25  0558   Weight: (!) 143 kg (315 lb) (!) 136 kg (300 lb 11.3 oz) (!) 137 kg (302 lb 14.6 oz)     Physical Exam  General.  Middle-aged gentleman.  Obese.  Alert and oriented x 4.  In no apparent pain/distress/diaphoresis.  Normal mood and affect.  Eyes.  Pupils equal round and reactive.  Intact extraocular musculature.  No pallor or jaundice  Oral cavity.  Moist mucous membrane.  Neck.  Supple.  No JVD.  No lymphadenopathy or thyromegaly.  Cardio vascular.  Regular rate and rhythm with no gallops or murmurs  Chest.  Poor to auscultation bilaterally with no added sounds.  Abdomen.  Obese.  Distended.  Positive bowel sounds.  No localized tenderness.  No guarding or rebound.  No organomegaly  Extremities.  No clubbing/cyanosis/edema.  CNS.  No acute focal neurological deficits.    Results Review:      Results from last 7 days   Lab Units 25  0945 25  0837 25  0942  "  SODIUM mmol/L 135* 135* 135*   POTASSIUM mmol/L 4.2 5.2 5.0   CHLORIDE mmol/L 98 98 98   CO2 mmol/L 27.8 28.0 29.5*   BUN mg/dL 9.0 8.0 8.0   CREATININE mg/dL 0.89 0.92 0.81   GLUCOSE mg/dL 149* 241* 137*   CALCIUM mg/dL 8.9 9.0 9.1   AST (SGOT) U/L  --   --  31   ALT (SGPT) U/L  --   --  29     Estimated Creatinine Clearance: 123.3 mL/min (by C-G formula based on SCr of 0.89 mg/dL).      Results from last 7 days   Lab Units 07/02/25  1111 07/02/25  0607 07/01/25  2051 07/01/25  1556 07/01/25  1018 07/01/25  0558 06/30/25 2051   GLUCOSE mg/dL 154* 142* 149* 153* 220* 173* 131*     Results from last 7 days   Lab Units 06/30/25  1133 06/30/25  0942   HSTROP T ng/L 11 13     Results from last 7 days   Lab Units 06/30/25  0942   PROBNP pg/mL 67.6                   Invalid input(s): \"LDLCALC\"  Results from last 7 days   Lab Units 07/02/25  0945 07/01/25  0837 06/30/25  0942   WBC 10*3/mm3 9.70 8.56 7.01   HEMOGLOBIN g/dL 15.0 15.2 15.2   HEMATOCRIT % 46.0 46.9 47.8   PLATELETS 10*3/mm3 210 210 199   MCV fL 92.7 94.2 95.2   MCH pg 30.2 30.5 30.3   MCHC g/dL 32.6 32.4 31.8   RDW % 15.8* 15.1 15.3   RDW-SD fl 53.3 52.2 53.7   MPV fL 11.1 10.6 10.9   NEUTROPHIL % %  --   --  72.4   LYMPHOCYTE % %  --   --  14.8*   MONOCYTES % %  --   --  10.4   EOSINOPHIL % %  --   --  1.7   BASOPHIL % %  --   --  0.4   IMM GRAN % %  --   --  0.3   NEUTROS ABS 10*3/mm3  --   --  5.07   LYMPHS ABS 10*3/mm3  --   --  1.04   MONOS ABS 10*3/mm3  --   --  0.73   EOS ABS 10*3/mm3  --   --  0.12   BASOS ABS 10*3/mm3  --   --  0.03   IMMATURE GRANS (ABS) 10*3/mm3  --   --  0.02   NRBC /100 WBC  --   --  0.0                     Results from last 7 days   Lab Units 06/30/25  0942   LIPASE U/L 16         Results from last 7 days   Lab Units 06/30/25  2145   ADENOVIRUS DETECTION BY PCR  Not Detected   CORONAVIRUS 229E  Not Detected   CORONAVIRUS HKU1  Not Detected   CORONAVIRUS NL63  Not Detected   CORONAVIRUS OC43  Not Detected   HUMAN " METAPNEUMOVIRUS  Not Detected   HUMAN RHINOVIRUS/ENTEROVIRUS  Not Detected   INFLUENZA B PCR  Not Detected   PARAINFLUENZA 1  Not Detected   PARAINFLUENZA VIRUS 2  Not Detected   PARAINFLUENZA VIRUS 3  Not Detected   PARAINFLUENZA VIRUS 4  Not Detected   BORDETELLA PERTUSSIS PCR  Not Detected   CHLAMYDOPHILA PNEUMONIAE PCR  Not Detected   MYCOPLAMA PNEUMO PCR  Not Detected   INFLUENZA A PCR  Not Detected   RSV, PCR  Not Detected               Imaging:  Imaging Results (Last 24 Hours)       ** No results found for the last 24 hours. 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           Acute hypoxemic respiratory failure and dyspnea secondary to wheezy bronchitis.  proBNP is normal.  His echo on 3/2025 revealing a normal ejection fraction.  CTA of the chest negative for PE but positive for airway thickening, atelectasis, airway thickening, scarring, tracheomalacia.  Respiratory PCR panel is negative.  Improving respiratory status on prednisone/DuoNeb.  Noted and appreciate pulmonary consult.  Wean off oxygen and check walking pulse oximetry in preparation for discharge tomorrow  Anxiety.  Chronic pain syndrome/history of substance abuse/methadone dependence.  Stable.  Continue methadone.  Abdominal distention and constipation.  Benign GI examination.  CT scan of the abdomen and pelvis revealed liver  hemangioma and no other acute disease.  Lipase and liver function test are normal.  Improved after relief of constipation.  Continue stool regimen and Amitiza.  Type 2 diabetes.  Continue sliding scale and check A1c.  Hypertension.  Good blood pressure control with no evidence of angina or congestive heart failure, continue Coreg and clonidine.  Hyperlipidemia.  Continue statin.  VTE prophylaxis.  Sequential compression device.      Discussed my findings and plan of treatment with the patient/significant other/nurses at multidisciplinary rounds  Disposition.  Anticipate discharge home tomorrow after checking a walking pulse oximetry          Jessica Ritter MD  [facility]  07/02/25  12:17 EDT

## 2025-07-02 NOTE — PROGRESS NOTES
Consult Daily Progress Note  [facility]   07/02/25      Patient Name:  Kwame Andres  MRN:  [id number]   YOB: 1960  Age: 64 y.o.  Sex: male  LOS: 0    Reason for Consult:  Shortness of breath, bronchitis    Hospital Course:   64-year-old male with a history of chronic hypoxic respiratory failure, severe COVID-19 infection requiring intubation and prolonged hospitalization who presented with shortness of breath and found to have bronchitis.    Interval History:  No acute events overnight  Breathing is significantly improved  Cough persist however is also improved  Abdominal distention also feels better  On 2 L nasal cannula  No nausea vomiting  No chest pain    Physical Exam:  Vitals:    07/02/25 0758   BP:    Pulse:    Resp: 16   Temp:    SpO2:        Intake/Output    Intake/Output Summary (Last 24 hours) at 7/2/2025 0809  Last data filed at 7/1/2025 1200  Gross per 24 hour   Intake 720 ml   Output --   Net 720 ml       General: Alert, nontoxic, NAD  HEENT: NC/AT, EOMI, MMM  Neck: Supple, trachea midline  Cardiac: RRR, no murmur, gallops, rubs  Pulmonary: Diminished bilaterally, no wheezes  GI: Mild distention, obese  Extremities: Warm, well perfused, no LE edema  Skin: no visible rash  Neuro: CN II - XII grossly intact  Psychiatry: Normal mood and affect      Data Review:  Results from last 7 days   Lab Units 07/01/25  0837 06/30/25  0942   WBC 10*3/mm3 8.56 7.01   HEMOGLOBIN g/dL 15.2 15.2   PLATELETS 10*3/mm3 210 199     Results from last 7 days   Lab Units 07/01/25  0837 06/30/25  0942   SODIUM mmol/L 135* 135*   POTASSIUM mmol/L 5.2 5.0   CHLORIDE mmol/L 98 98   CO2 mmol/L 28.0 29.5*   BUN mg/dL 8.0 8.0   CREATININE mg/dL 0.92 0.81   GLUCOSE mg/dL 241* 137*   CALCIUM mg/dL 9.0 9.1   Estimated Creatinine Clearance: 119.3 mL/min (by C-G formula based on SCr of 0.92 mg/dL).    Results from last 7 days   Lab Units 07/01/25  0837 06/30/25  0942   AST (SGOT) U/L  --  31   ALT (SGPT)  U/L  --  29   PLATELETS 10*3/mm3 210 199             Result Review:  I have personally reviewed the results from the time of this admission to 7/2/2025 08:09 EDT and agree with these findings:  [x]  Laboratory list / accordion  [x]  Microbiology  [x]  Radiology  []  EKG/Telemetry   [x]  Cardiology/Vascular   []  Pathology  [x]  Old records  []  Other:    ASSESSMENT  /  PLAN:    Dyspnea  Bronchitis on imaging  Severe COVID in October 2021 requiring intubation  Chronic hypoxic respiratory failure on 2-3 L nasal cannula  Type 2 diabetes mellitus  Hypertension  Anxiety  Morbid obesity  Possible tracheobronchomalacia    -Patient presented to the hospital with shortness of breath and dizziness found to have bronchitis  - Respiratory viral panel negative  - Symptoms improved with bronchodilators , continue DuoNebs 4 times daily  - Prednisone 40 mg daily for 5-day course  - Supplemental oxygen, wean as tolerated for saturation goal greater than 90%, weaned to room air during evaluation  - Significant changes on CT angiogram of the chest, bilateral atelectasis/scarring from severe COVID-19 infection in October 2021.  Does not follow with pulmonary.  Will need to follow-up as an outpatient.  - Unclear etiology to abdominal distention, CT abdomen pelvis was unremarkable, echocardiogram from March 2025 with preserved EF, mild to moderate concentric hypertrophy, RVSP normal, LFTs unremarkable, renal function normal.  Abdominal distention feels improved per patient  - Anticipate stability for discharge from pulmonary standpoint tomorrow.    Thank you for allowing us to participate in this patients care. Pulmonary will continue to follow.     Rebel Yadav MD  [facility]  Pulmonary and Critical Care Medicine, Interventional Pulmonology    Parts of this note may be an electronic transcription/translation of spoken language to printed text using the Dragon dictation system.

## 2025-07-02 NOTE — SIGNIFICANT NOTE
07/02/25 1025   OTHER   Discipline physical therapist   Therapy Assessment/Plan (PT)   Criteria for Skilled Interventions Met (PT) no problems identified which require skilled intervention  (Pt denies skilled PT needs, reports he is feeling better and had 4 BMs. will s/o acutely. reorder as needed. pt/family bedside aware and agreeable)

## 2025-07-03 VITALS
HEART RATE: 76 BPM | TEMPERATURE: 98.6 F | HEIGHT: 74 IN | RESPIRATION RATE: 18 BRPM | OXYGEN SATURATION: 93 % | WEIGHT: 302.03 LBS | DIASTOLIC BLOOD PRESSURE: 84 MMHG | BODY MASS INDEX: 38.76 KG/M2 | SYSTOLIC BLOOD PRESSURE: 131 MMHG

## 2025-07-03 PROBLEM — E78.5 DYSLIPIDEMIA: Status: ACTIVE | Noted: 2025-07-03

## 2025-07-03 PROBLEM — J96.01 ACUTE HYPOXEMIC RESPIRATORY FAILURE: Status: RESOLVED | Noted: 2025-07-03 | Resolved: 2025-07-03

## 2025-07-03 PROBLEM — R06.09 DYSPNEA ON EXERTION: Status: RESOLVED | Noted: 2025-06-30 | Resolved: 2025-07-03

## 2025-07-03 PROBLEM — K59.01 SLOW TRANSIT CONSTIPATION: Status: RESOLVED | Noted: 2025-03-14 | Resolved: 2025-07-03

## 2025-07-03 PROBLEM — F19.10 POLYSUBSTANCE ABUSE: Status: RESOLVED | Noted: 2021-11-19 | Resolved: 2025-07-03

## 2025-07-03 PROBLEM — J96.01 ACUTE HYPOXEMIC RESPIRATORY FAILURE: Status: ACTIVE | Noted: 2025-07-03

## 2025-07-03 LAB
ANION GAP SERPL CALCULATED.3IONS-SCNC: 10.4 MMOL/L (ref 5–15)
BASOPHILS # BLD AUTO: 0.03 10*3/MM3 (ref 0–0.2)
BASOPHILS NFR BLD AUTO: 0.3 % (ref 0–1.5)
BUN SERPL-MCNC: 11 MG/DL (ref 8–23)
BUN/CREAT SERPL: 10.3 (ref 7–25)
CALCIUM SPEC-SCNC: 9.1 MG/DL (ref 8.6–10.5)
CHLORIDE SERPL-SCNC: 97 MMOL/L (ref 98–107)
CO2 SERPL-SCNC: 28.6 MMOL/L (ref 22–29)
CREAT SERPL-MCNC: 1.07 MG/DL (ref 0.76–1.27)
DEPRECATED RDW RBC AUTO: 51.9 FL (ref 37–54)
EGFRCR SERPLBLD CKD-EPI 2021: 77.5 ML/MIN/1.73
EOSINOPHIL # BLD AUTO: 0.08 10*3/MM3 (ref 0–0.4)
EOSINOPHIL NFR BLD AUTO: 0.7 % (ref 0.3–6.2)
ERYTHROCYTE [DISTWIDTH] IN BLOOD BY AUTOMATED COUNT: 15.1 % (ref 12.3–15.4)
GLUCOSE BLDC GLUCOMTR-MCNC: 113 MG/DL (ref 70–130)
GLUCOSE BLDC GLUCOMTR-MCNC: 154 MG/DL (ref 70–130)
GLUCOSE SERPL-MCNC: 119 MG/DL (ref 65–99)
HBA1C MFR BLD: 7.6 % (ref 4.8–5.6)
HCT VFR BLD AUTO: 49 % (ref 37.5–51)
HGB BLD-MCNC: 15.7 G/DL (ref 13–17.7)
IMM GRANULOCYTES # BLD AUTO: 0.04 10*3/MM3 (ref 0–0.05)
IMM GRANULOCYTES NFR BLD AUTO: 0.3 % (ref 0–0.5)
LYMPHOCYTES # BLD AUTO: 2.28 10*3/MM3 (ref 0.7–3.1)
LYMPHOCYTES NFR BLD AUTO: 19 % (ref 19.6–45.3)
MCH RBC QN AUTO: 30.2 PG (ref 26.6–33)
MCHC RBC AUTO-ENTMCNC: 32 G/DL (ref 31.5–35.7)
MCV RBC AUTO: 94.2 FL (ref 79–97)
MONOCYTES # BLD AUTO: 1.37 10*3/MM3 (ref 0.1–0.9)
MONOCYTES NFR BLD AUTO: 11.4 % (ref 5–12)
NEUTROPHILS NFR BLD AUTO: 68.3 % (ref 42.7–76)
NEUTROPHILS NFR BLD AUTO: 8.19 10*3/MM3 (ref 1.7–7)
NRBC BLD AUTO-RTO: 0 /100 WBC (ref 0–0.2)
PLATELET # BLD AUTO: 226 10*3/MM3 (ref 140–450)
PMV BLD AUTO: 10.8 FL (ref 6–12)
POTASSIUM SERPL-SCNC: 3.9 MMOL/L (ref 3.5–5.2)
RBC # BLD AUTO: 5.2 10*6/MM3 (ref 4.14–5.8)
SODIUM SERPL-SCNC: 136 MMOL/L (ref 136–145)
WBC NRBC COR # BLD AUTO: 11.99 10*3/MM3 (ref 3.4–10.8)

## 2025-07-03 PROCEDURE — 63710000001 PREDNISONE PER 1 MG: Performed by: STUDENT IN AN ORGANIZED HEALTH CARE EDUCATION/TRAINING PROGRAM

## 2025-07-03 PROCEDURE — 94760 N-INVAS EAR/PLS OXIMETRY 1: CPT

## 2025-07-03 PROCEDURE — 83036 HEMOGLOBIN GLYCOSYLATED A1C: CPT | Performed by: INTERNAL MEDICINE

## 2025-07-03 PROCEDURE — 94664 DEMO&/EVAL PT USE INHALER: CPT

## 2025-07-03 PROCEDURE — 94799 UNLISTED PULMONARY SVC/PX: CPT

## 2025-07-03 PROCEDURE — 82948 REAGENT STRIP/BLOOD GLUCOSE: CPT

## 2025-07-03 PROCEDURE — 80048 BASIC METABOLIC PNL TOTAL CA: CPT | Performed by: INTERNAL MEDICINE

## 2025-07-03 PROCEDURE — 85025 COMPLETE CBC W/AUTO DIFF WBC: CPT | Performed by: INTERNAL MEDICINE

## 2025-07-03 PROCEDURE — G0378 HOSPITAL OBSERVATION PER HR: HCPCS

## 2025-07-03 RX ORDER — GUAIFENESIN 600 MG/1
1200 TABLET, EXTENDED RELEASE ORAL 2 TIMES DAILY
Qty: 60 TABLET | Refills: 3 | Status: SHIPPED | OUTPATIENT
Start: 2025-07-03

## 2025-07-03 RX ORDER — LUBIPROSTONE 24 UG/1
24 CAPSULE ORAL
Qty: 30 CAPSULE | Refills: 3 | Status: SHIPPED | OUTPATIENT
Start: 2025-07-03

## 2025-07-03 RX ORDER — PREDNISONE 20 MG/1
40 TABLET ORAL
Qty: 2 TABLET | Refills: 0 | Status: SHIPPED | OUTPATIENT
Start: 2025-07-04 | End: 2025-07-05

## 2025-07-03 RX ORDER — IPRATROPIUM BROMIDE AND ALBUTEROL SULFATE 2.5; .5 MG/3ML; MG/3ML
3 SOLUTION RESPIRATORY (INHALATION)
Qty: 360 ML | Refills: 3 | Status: SHIPPED | OUTPATIENT
Start: 2025-07-03

## 2025-07-03 RX ADMIN — IPRATROPIUM BROMIDE AND ALBUTEROL SULFATE 3 ML: .5; 3 SOLUTION RESPIRATORY (INHALATION) at 10:49

## 2025-07-03 RX ADMIN — CARVEDILOL 3.12 MG: 3.12 TABLET, FILM COATED ORAL at 08:39

## 2025-07-03 RX ADMIN — CLONIDINE HYDROCHLORIDE 0.1 MG: 0.1 TABLET ORAL at 08:41

## 2025-07-03 RX ADMIN — IPRATROPIUM BROMIDE AND ALBUTEROL SULFATE 3 ML: .5; 3 SOLUTION RESPIRATORY (INHALATION) at 07:45

## 2025-07-03 RX ADMIN — PRAVASTATIN SODIUM 40 MG: 20 TABLET ORAL at 08:40

## 2025-07-03 RX ADMIN — METHADONE HYDROCHLORIDE 20 MG: 10 TABLET ORAL at 08:41

## 2025-07-03 RX ADMIN — PREDNISONE 40 MG: 20 TABLET ORAL at 08:40

## 2025-07-03 RX ADMIN — LUBIPROSTONE 24 MCG: 8 CAPSULE, GELATIN COATED ORAL at 08:39

## 2025-07-03 NOTE — DISCHARGE SUMMARY
Patient Name: Kwame Andres  : 1960  MRN: [id number]    Date of Admission: 2025  Date of Discharge:  7/3/2025  Primary Care Physician: Provider, No Known      Discharge Diagnoses     Active Hospital Problems    Diagnosis  POA    Dyslipidemia [E78.5]  Yes    Methadone dependence [F11.20]  Yes    Opioid use disorder, moderate, in sustained remission [F11.21]  Yes    PTSD (post-traumatic stress disorder) [F43.10]  Yes    Chronic pain [G89.29]  Yes    Type 2 diabetes mellitus, without long-term current use of insulin [E11.9]  Yes    Essential hypertension [I10]  Yes      Resolved Hospital Problems    Diagnosis Date Resolved POA    **Dyspnea on exertion [R06.09] 2025 Yes    Acute hypoxemic respiratory failure [J96.01] 2025 Yes    Slow transit constipation [K59.01] 2025 Yes    Polysubstance abuse [F19.10] 2025 Yes    Drug-seeking behavior [Z76.5] 2025 Yes        Hospital Course     Brief admission history and physical.  Please refer to the H&P for full details.  A pleasant 64 years old gentleman with a past history of chronic pain/substance abuse/methadone dependence/anxiety//hypertension/dyslipidemia who presented to the emergency department with exertional dyspnea/dizziness/abdominal distention/constipation.  Physical examination on admission included an afebrile patient with stable vital signs/obesity/poor bilateral air entry/trace bilateral lower extremity edema  Hospital course.  Initial ER evaluation included a CBC that was normal.  Lipase was normal.  Troponin was normal.  proBNP normal.  CMP normal except random blood sugar of 137, sodium 135, CO2 of 29.5.  Repeat troponin is negative.  Respiratory PCR panel is negative.  CTA of the chest revealed no pulmonary embolus with evidence of airway disease and thickened airway walls and multifocal subsegmental atelectasis bilaterally with narrowing of the central airway that may indicate tracheomalacia pulmonary  hypertension.  CTA of the abdomen and pelvis revealed no acute abnormalities.  Hospital course will be summarized in a problem-oriented manner as below  1.  Acute hypoxemic respiratory failure and exertional dyspnea secondary to acute wheezy bronchitis.  proBNP was normal.  Patient had an echo on March 2025 revealing a normal ejection fraction.  CTA of the chest negative for PE but positive for airway thickening and atelectasis and scarring with possible tracheomalacia.  Respiratory PCR panel was negative.  Patient was started on DuoNebs and p.o. prednisone with improvement of his pulmonary status.  Walking pulse oximetry indicated the need for oxygen at home and oxygen was prescribed.  He is to follow-up with primary MD and pulmonary as an outpatient and continue DuoNebs and oxygen at home.  2.  Chronic pain syndrome/history of substance abuse/methadone dependence.  This remained stable during this admission.  His methadone was maintained  3.  Anxiety.  Remains stable on clonidine and as needed Atarax.  4.  Abdominal distention and slow transit constipation.  Patient was started on bowel regimen and Amitiza was added.  He eventually had good bowel movements and his abdominal distention has resolved.  He had a benign GI examination during this admission.  Lipase and liver function test are normal.  CT scan of the abdomen pelvis was negative except for liver hemangioma and no other acute disease.  5.  Type 2 diabetes.  He was placed on sliding scale insulin while in the hospital and his Accu-Cheks were acceptable.  Metformin was held inside the hospital and resumed at the time of discharge.  A1c was 7.6 during this admission  6.  Hypertension.  Patient remained with good blood pressure control and no evidence of angina or congestive heart failure while on Coreg and clonidine.  7.  Hyperlipidemia statin was maintained during this admission.    At the time of discharge patient was hemodynamically stable.  He is to  follow-up with primary MD and pulmonary as an outpatient.  Consultants     Consult Orders (all) (From admission, onward)       Start     Ordered    06/30/25 1730  Inpatient Nutrition Consult  Once        Provider:  (Not yet assigned)    06/30/25 1729    06/30/25 1522  Inpatient Pulmonology Consult  Once        Specialty:  Pulmonary Disease  Provider:  Nabor Villarreal MD    06/30/25 1522    06/30/25 1420  [facility] (on-call MD unless specified) Details  Once        Specialty:  Hospitalist  Provider:  (Not yet assigned)    06/30/25 1419                  Procedures     Imaging Results (All)       Procedure Component Value Units Date/Time    CT Angiogram Chest Pulmonary Embolism [[id number]] Collected: 06/30/25 1251     Updated: 06/30/25 1324    Narrative:      CT ANGIOGRAM CHEST PULMONARY EMBOLISM-, CT ABDOMEN PELVIS W CONTRAST-     INDICATION: Abdominal pain, distention. Concern for pulmonary embolism.  Shortness of air.     COMPARISON: CT chest August 25, 2016 and CT abdomen pelvis March 14, 2025     TECHNIQUE:  CTA chest with IV contrast. CT abdomen pelvis with contrast. Coronal and  sagittal reformats. Three dimensional reconstructions. Radiation dose  reduction techniques were utilized, including automated exposure control  and exposure modulation based on body size.     FINDINGS:      Pulmonary arteries: Enlarged main pulmonary artery measuring 4.5 cm.  Streak artifact from contrast in the venous system. Poor opacification  of subsegmental pulmonary arteries. No pulmonary embolism identified.      Chest wall: Gynecomastia. No lymphadenopathy. Fatty atrophy of the left  rotator cuff musculature and deltoid.     Mediastinum: Heart is at upper limits in size. No pericardial effusion.  No mediastinal or hilar lymphadenopathy.     Lungs/pleura: No effusions. Airways wall thickening. Narrowing of  central airways. Biapical pleural-parenchymal thickening. Posterior  dependent and bibasilar subsegmental atelectasis. Calcified  pulmonary  nodule in the anterior basilar left lower lobe base, consistent with  prior granulomatous infection. Scattered subsegmental atelectasis seen  in the bilateral lungs. Small subpleural blebs and suspected air  trapping seen in the right lower lobe.     ABDOMEN: Small hypoattenuating lesion seen along the capsule of segment  2/3 of the left hepatic lobe, series 2, axial mage 32, measuring 1.4 cm,  stable, with benign behavior, potential hemangioma. Possible steatosis  or perfusional hypoattenuation along the gallbladder fossa. Normal  gallbladder. Mild intrahepatic and extrahepatic biliary ductal  dilatation, stable and chronic, with the common bile duct measuring 8 to  9 mm. Spleen is normal in size. No pancreatic mass or pancreatic ductal  dilatation seen. No adrenal nodules. Small cyst seen in the superior  pole right kidney. No solid-appearing renal mass or hydronephrosis.  Lobular renal contours with some areas of cortical loss, suspect  scarring.     Pelvis: Prostate is normal in size. Mild bladder distention. No bladder  calculus.     Bowel: No small bowel obstruction. Moderate gas and stool seen in the  colon. Normal appendix.     Abdominal wall: Rectus diastases. Small bowel containing Gomes type  umbilical hernia, without obstruction.     Retroperitoneum: No lymphadenopathy.     Vasculature: Patent. No abdominal aortic aneurysm.     Osseous structures: No destructive osseous lesions. Suspected hemangioma  in T7. Mild bilateral hip osteoarthritis. Lumbar facet degenerative  arthropathy.       Impression:         1. No pulmonary embolism identified.  2. Airways disease with thickened airway walls and multifocal  subsegmental atelectasis suspected in the bilateral lungs.  3. Narrowing of central airways may indicate underlying  tracheobronchomalacia.  4. Enlarged main pulmonary artery, can be seen with pulmonary artery  hypertension.  5. No acute findings identified in the abdomen or pelvis.  Nonemergent  findings in the abdomen and pelvis above.     This report was finalized on 6/30/2025 1:21 PM by Dr. Noam Sanford M.D on Workstation: [id number]       CT Abdomen Pelvis With Contrast [[id number]] Collected: 06/30/25 1251     Updated: 06/30/25 1324    Narrative:      CT ANGIOGRAM CHEST PULMONARY EMBOLISM-, CT ABDOMEN PELVIS W CONTRAST-     INDICATION: Abdominal pain, distention. Concern for pulmonary embolism.  Shortness of air.     COMPARISON: CT chest August 25, 2016 and CT abdomen pelvis March 14, 2025     TECHNIQUE:  CTA chest with IV contrast. CT abdomen pelvis with contrast. Coronal and  sagittal reformats. Three dimensional reconstructions. Radiation dose  reduction techniques were utilized, including automated exposure control  and exposure modulation based on body size.     FINDINGS:      Pulmonary arteries: Enlarged main pulmonary artery measuring 4.5 cm.  Streak artifact from contrast in the venous system. Poor opacification  of subsegmental pulmonary arteries. No pulmonary embolism identified.      Chest wall: Gynecomastia. No lymphadenopathy. Fatty atrophy of the left  rotator cuff musculature and deltoid.     Mediastinum: Heart is at upper limits in size. No pericardial effusion.  No mediastinal or hilar lymphadenopathy.     Lungs/pleura: No effusions. Airways wall thickening. Narrowing of  central airways. Biapical pleural-parenchymal thickening. Posterior  dependent and bibasilar subsegmental atelectasis. Calcified pulmonary  nodule in the anterior basilar left lower lobe base, consistent with  prior granulomatous infection. Scattered subsegmental atelectasis seen  in the bilateral lungs. Small subpleural blebs and suspected air  trapping seen in the right lower lobe.     ABDOMEN: Small hypoattenuating lesion seen along the capsule of segment  2/3 of the left hepatic lobe, series 2, axial mage 32, measuring 1.4 cm,  stable, with benign behavior, potential hemangioma. Possible  steatosis  or perfusional hypoattenuation along the gallbladder fossa. Normal  gallbladder. Mild intrahepatic and extrahepatic biliary ductal  dilatation, stable and chronic, with the common bile duct measuring 8 to  9 mm. Spleen is normal in size. No pancreatic mass or pancreatic ductal  dilatation seen. No adrenal nodules. Small cyst seen in the superior  pole right kidney. No solid-appearing renal mass or hydronephrosis.  Lobular renal contours with some areas of cortical loss, suspect  scarring.     Pelvis: Prostate is normal in size. Mild bladder distention. No bladder  calculus.     Bowel: No small bowel obstruction. Moderate gas and stool seen in the  colon. Normal appendix.     Abdominal wall: Rectus diastases. Small bowel containing Gomes type  umbilical hernia, without obstruction.     Retroperitoneum: No lymphadenopathy.     Vasculature: Patent. No abdominal aortic aneurysm.     Osseous structures: No destructive osseous lesions. Suspected hemangioma  in T7. Mild bilateral hip osteoarthritis. Lumbar facet degenerative  arthropathy.       Impression:         1. No pulmonary embolism identified.  2. Airways disease with thickened airway walls and multifocal  subsegmental atelectasis suspected in the bilateral lungs.  3. Narrowing of central airways may indicate underlying  tracheobronchomalacia.  4. Enlarged main pulmonary artery, can be seen with pulmonary artery  hypertension.  5. No acute findings identified in the abdomen or pelvis. Nonemergent  findings in the abdomen and pelvis above.     This report was finalized on 6/30/2025 1:21 PM by Dr. Noam Sanford M.D on Workstation: [id number]       XR Chest 1 View [[id number]] Collected: 06/30/25 1024     Updated: 06/30/25 1031    Narrative:      XR CHEST 1 VW-     HISTORY: Male who is 64 years-old, short of breath     TECHNIQUE: Frontal view of the chest     COMPARISON: 3/13/2025     FINDINGS: The heart size is borderline. Aorta is calcified.  "Pulmonary  vasculature is mildly congested. Minimal likely atelectasis at the right  base. Small likely atelectasis or scarring at the left midlung. No  pleural effusion, or pneumothorax, follow-up as indications persist. No  acute osseous process.       Impression:      As described.     This report was finalized on 6/30/2025 10:28 AM by Dr. Lalit Pete M.D on Workstation: [id number]               Pertinent Labs     Results from last 7 days   Lab Units 07/03/25  0431 07/02/25  0945 07/01/25  0837 06/30/25  0942   WBC 10*3/mm3 11.99* 9.70 8.56 7.01   HEMOGLOBIN g/dL 15.7 15.0 15.2 15.2   PLATELETS 10*3/mm3 226 210 210 199     Results from last 7 days   Lab Units 07/03/25 0431 07/02/25  0945 07/01/25  0837 06/30/25  0942   SODIUM mmol/L 136 135* 135* 135*   POTASSIUM mmol/L 3.9 4.2 5.2 5.0   CHLORIDE mmol/L 97* 98 98 98   CO2 mmol/L 28.6 27.8 28.0 29.5*   BUN mg/dL 11.0 9.0 8.0 8.0   CREATININE mg/dL 1.07 0.89 0.92 0.81   GLUCOSE mg/dL 119* 149* 241* 137*   Estimated Creatinine Clearance: 102.6 mL/min (by C-G formula based on SCr of 1.07 mg/dL).  Results from last 7 days   Lab Units 06/30/25  0942   ALBUMIN g/dL 3.9   BILIRUBIN mg/dL 0.5   ALK PHOS U/L 78   AST (SGOT) U/L 31   ALT (SGPT) U/L 29     Results from last 7 days   Lab Units 07/03/25  0431 07/02/25  0945 07/01/25  0837 06/30/25  0942   CALCIUM mg/dL 9.1 8.9 9.0 9.1   ALBUMIN g/dL  --   --   --  3.9     Results from last 7 days   Lab Units 06/30/25  0942   LIPASE U/L 16     Results from last 7 days   Lab Units 06/30/25  1133 06/30/25  0942   HSTROP T ng/L 11 13   PROBNP pg/mL  --  67.6           Invalid input(s): \"LDLCALC\"      Imaging Results (Last 24 Hours)       ** No results found for the last 24 hours. **            Test Results Pending at Discharge         Discharge Exam   Physical Exam  Vitals.  Temperature 97.9 a pulse of 72 respiratory rate of 18 and blood pressure 124/83 and an O2 sats of 97% on 2 L  General.  Middle-aged gentleman.  Obese.  " Alert and oriented x 4.  In no apparent pain/distress/diaphoresis.  Normal mood and affect.  Eyes.  Pupils equal round and reactive.  Intact extraocular musculature.  No pallor or jaundice  Oral cavity.  Moist mucous membrane.  Neck.  Supple.  No JVD.  No lymphadenopathy or thyromegaly.  Cardio vascular.  Regular rate and rhythm with no gallops or murmurs  Chest.  Poor to auscultation bilaterally with no added sounds.  Abdomen.  Obese.  Distended.  Positive bowel sounds.  No localized tenderness.  No guarding or rebound.  No organomegaly  Extremities.  No clubbing/cyanosis/edema.  CNS.  No acute focal neurological deficits.  Discharge Details        Discharge Medications        New Medications        Instructions Start Date   guaiFENesin 600 MG 12 hr tablet  Commonly known as: Mucinex   1,200 mg, Oral, 2 Times Daily      ipratropium-albuterol 0.5-2.5 mg/3 ml nebulizer  Commonly known as: DUO-NEB   3 mL, Nebulization, 4 Times Daily - RT      lubiprostone 24 MCG capsule  Commonly known as: AMITIZA   24 mcg, Oral, Daily With Breakfast      predniSONE 20 MG tablet  Commonly known as: DELTASONE   40 mg, Oral, Daily With Breakfast   Start Date: July 4, 2025            Continue These Medications        Instructions Start Date   carvedilol 3.125 MG tablet  Commonly known as: COREG   3.125 mg, Oral, 2 Times Daily With Meals      clonazePAM 0.5 MG tablet  Commonly known as: KlonoPIN   0.5 mg, Nightly PRN      cloNIDine 0.1 MG tablet  Commonly known as: CATAPRES   0.1 mg, Oral, Every 12 Hours Scheduled      hydrOXYzine 50 MG tablet  Commonly known as: ATARAX   50 mg, Oral, 3 Times Daily PRN      metFORMIN 1000 MG tablet  Commonly known as: GLUCOPHAGE   1,000 mg, Oral, 2 Times Daily With Meals      methadone 10 MG tablet  Commonly known as: DOLOPHINE   20 mg, 2 Times Daily      polyethylene glycol 17 GM/SCOOP powder  Commonly known as: MIRALAX   17 g, Oral, 2 Times Daily, Mix 1 capful in beverage as directed and drink twice  "daily.      pravastatin 40 MG tablet  Commonly known as: PRAVACHOL   40 mg, Oral, Daily      Senexon-S 8.6-50 MG per tablet  Generic drug: sennosides-docusate   2 tablets, Oral, 2 Times Daily               Allergies   Allergen Reactions    Shrimp Other (See Comments)     \"break out in a sweat\", recent reaction         Discharge Disposition:  Condition: Stable    Diet:   Diet Order   Procedures    Diet: Cardiac; Healthy Heart (2-3 Na+); Fluid Consistency: Thin (IDDSI 0)       Activity:   Activity Instructions       Activity as Tolerated              Counseling : Monitor daily blood pressure/pulse/blood sugar    CODE STATUS:    Code Status and Medical Interventions: CPR (Attempt to Resuscitate); Full   Ordered at: 06/30/25 1521     Code Status (Patient has no pulse and is not breathing):    CPR (Attempt to Resuscitate)     Medical Interventions (Patient has pulse or is breathing):    Full       No future appointments.  Additional Instructions for the Follow-ups that You Need to Schedule       Call MD With Problems / Concerns   As directed      Instructions: Call MD or return to ER if chest pain/shortness of breath/wheezing/cough/lower extremity edema/palpitation/fever chills/hemoptysis/nausea or vomiting/changes in the bowel habits    Order Comments: Instructions: Call MD or return to ER if chest pain/shortness of breath/wheezing/cough/lower extremity edema/palpitation/fever chills/hemoptysis/nausea or vomiting/changes in the bowel habits         Discharge Follow-up with PCP   As directed       Currently Documented PCP:    Provider, No Known    PCP Phone Number:    [phone]     Follow Up Details: 1 week.  Acute hypoxemic respiratory failure/history of wheezy bronchitis/chronic pain        Discharge Follow-up with Specified Provider: Pulmonary; 2 Weeks   As directed      To: Pulmonary   Follow Up: 2 Weeks   Follow Up Details: Acute hypoxemic respiratory failure/wheezing bronchitis               Contact information " for follow-up providers       Provider, No Known .    Why: 1 week.  Acute hypoxemic respiratory failure/history of wheezy bronchitis/chronic pain  Contact information:  [facility] [address] [phone]               Provider, No Known .    Why: 1 week.  Acute hypoxemic respiratory failure/history of wheezy bronchitis/chronic pain  Contact information:  [facility] [id number]                       Contact information for after-discharge care       Durable Medical Equipment       [facility] .    Service: Durable Medical Equipment  Contact information:  [address]  [facility] [address]  [phone]                                     Time Spent on Discharge:  Greater than 30 minutes      Jessica Ritter MD  [facility]  07/03/25  09:37 EDT

## 2025-07-03 NOTE — CASE MANAGEMENT/SOCIAL WORK
Continued Stay Note  [facility]     Patient Name: Kwame Andres  MRN: [id number]  Today's Date: 7/3/2025    Admit Date: 6/30/2025    Plan: Home   Discharge Plan       Row Name 07/03/25 1000       Plan    Plan Home    Plan Comments DC orders and order for O2 and nebulizer noted.  S/W pt at bedside who confirms he already has continuous home O2 with [employer].  He does need a nebulizer.  CCP spoke w/ / [employer] who confirms pt is current with them for continuous home O2.  They delivered a portable O2 concentrator to him here on Tuesday. CCP did see the POC in patient's room.  CCP notified  of order for nebulizer.  She states [employer] will deliver it to patient's home today.  [facility].  Pt states he does not have a PCP.  [facility] appt liaison contact # given and discussed.  Pt states he does not have transport home.  [facility] transport will be arranged.........Agnes VALENZUELA/ S[place]IA    Final Note Home                   Discharge Codes    No documentation.                 Expected Discharge Date and Time       Expected Discharge Date Expected Discharge Time    Jul 3, 2025               Agnes Dumont RN     Post-Care Instructions: I reviewed with the patient in detail post-care instructions. Patient is to wear sunprotection, and avoid picking at any of the treated lesions. Pt may apply Vaseline to crusted or scabbing areas. Medical Necessity Clause: This procedure was medically necessary because the lesions that were treated were: Medical Necessity Information: It is in your best interest to select a reason for this procedure from the list below. All of these items fulfill various CMS LCD requirements except the new and changing color options. Render Note In Bullet Format When Appropriate: No Consent: The patient's consent was obtained including but not limited to risks of crusting, scabbing, blistering, scarring, darker or lighter pigmentary change, recurrence, incomplete removal and infection. Duration Of Freeze Thaw-Cycle (Seconds): 5-10 Number Of Freeze-Thaw Cycles: 2 freeze-thaw cycles Detail Level: Detailed

## 2025-07-03 NOTE — PROGRESS NOTES
Consult Daily Progress Note  [facility]   07/03/25      Patient Name:  Kwame Andres  MRN:  [id number]   YOB: 1960  Age: 64 y.o.  Sex: male  LOS: 0    Reason for Consult:  Shortness of breath, bronchitis    Hospital Course:   64-year-old male with a history of chronic hypoxic respiratory failure, severe COVID-19 infection requiring intubation and prolonged hospitalization who presented with shortness of breath and found to have bronchitis.    Interval History:  No acute events overnight  Breathing significantly proved  Cough is also improved  Wants to go home today  Ambulated around the halls comfortably  No acute complaints  Family at bedside    Physical Exam:  Vitals:    07/03/25 0749   BP:    Pulse: 68   Resp: 18   Temp:    SpO2:        Intake/Output    Intake/Output Summary (Last 24 hours) at 7/3/2025 0850  Last data filed at 7/2/2025 1823  Gross per 24 hour   Intake 1200 ml   Output --   Net 1200 ml       General: Alert, nontoxic, NAD  HEENT: NC/AT, EOMI, MMM  Neck: Supple, trachea midline  Cardiac: RRR, no murmur, gallops, rubs  Pulmonary: Diminished bilaterally, no wheezes  GI: Mild distention, obese  Extremities: Warm, well perfused, no LE edema  Skin: no visible rash  Neuro: CN II - XII grossly intact  Psychiatry: Normal mood and affect      Data Review:  Results from last 7 days   Lab Units 07/03/25  0431 07/02/25  0945 07/01/25  0837 06/30/25  0942   WBC 10*3/mm3 11.99* 9.70 8.56 7.01   HEMOGLOBIN g/dL 15.7 15.0 15.2 15.2   PLATELETS 10*3/mm3 226 210 210 199     Results from last 7 days   Lab Units 07/03/25  0431 07/02/25  0945 07/01/25  0837 06/30/25  0942   SODIUM mmol/L 136 135* 135* 135*   POTASSIUM mmol/L 3.9 4.2 5.2 5.0   CHLORIDE mmol/L 97* 98 98 98   CO2 mmol/L 28.6 27.8 28.0 29.5*   BUN mg/dL 11.0 9.0 8.0 8.0   CREATININE mg/dL 1.07 0.89 0.92 0.81   GLUCOSE mg/dL 119* 149* 241* 137*   CALCIUM mg/dL 9.1 8.9 9.0 9.1   Estimated Creatinine Clearance: 102.6 mL/min (by  C-G formula based on SCr of 1.07 mg/dL).    Results from last 7 days   Lab Units 07/03/25  0431 07/02/25  0945 07/01/25  0837 06/30/25  0942   AST (SGOT) U/L  --   --   --  31   ALT (SGPT) U/L  --   --   --  29   PLATELETS 10*3/mm3 226 210 210 199             Result Review:  I have personally reviewed the results from the time of this admission to 7/3/2025 08:50 EDT and agree with these findings:  [x]  Laboratory list / accordion  [x]  Microbiology  [x]  Radiology  []  EKG/Telemetry   [x]  Cardiology/Vascular   []  Pathology  [x]  Old records  []  Other:    ASSESSMENT  /  PLAN:    Dyspnea  Bronchitis on imaging  Severe COVID in October 2021 requiring intubation  Chronic hypoxic respiratory failure on 2-3 L nasal cannula  Type 2 diabetes mellitus  Hypertension  Anxiety  Morbid obesity  Possible tracheobronchomalacia    -Patient presented to the hospital with shortness of breath and dizziness found to have bronchitis  - Respiratory viral panel negative  - Symptoms improved with bronchodilators , continue DuoNebs 4 times daily  - Prednisone 40 mg daily for 5-day course (EOT 7/4)  - Weaned to room air, tolerating well.  Walking oximetry today.  - Significant changes on CT angiogram of the chest, bilateral atelectasis/scarring from severe COVID-19 infection in October 2021.  Does not follow with pulmonary.  Will need to follow-up as an outpatient.  - Unclear etiology to abdominal distention, CT abdomen pelvis was unremarkable, echocardiogram from March 2025 with preserved EF, mild to moderate concentric hypertrophy, RVSP normal, LFTs unremarkable, renal function normal.  Abdominal distention feels improved per patient  - Stable for discharge from pulmonary standpoint.  We will set him up for follow-up in pulmonary clinic as an outpatient.    Thank you for allowing us to participate in this patients care. Pulmonary will sign off at this time.  Please contact us if further questions or concerns arise.    Rebel Yadav,  MD [facility]  Pulmonary and Critical Care Medicine, Interventional Pulmonology    Parts of this note may be an electronic transcription/translation of spoken language to printed text using the Dragon dictation system.

## 2025-07-03 NOTE — NURSING NOTE
Pt a/ox4 pt walked around the unit 2x with nurse pt short of breath while walking pt O2 stayed at 88% pt had no dizziness respiration was labored  no other concerns as present  Notified at the bed side Plan of Care on going

## 2025-07-03 NOTE — CASE MANAGEMENT/SOCIAL WORK
Case Management Discharge Note      Final Note: Home         Selected Continued Care - Discharged on 7/3/2025 Admission date: 6/30/2025 - Discharge disposition: Home or Self Care      Destination    No services have been selected for the patient.                Durable Medical Equipment Coordination complete.      Service Provider Services Address Phone Fax Patient Preferred    [facility] [address] [address] [phone] --                      Transportation Services  Transportation: Taxi  Taxi: Lyft    Final Discharge Disposition Code: 01 - home or self-care

## 2025-08-25 ENCOUNTER — APPOINTMENT (OUTPATIENT)
Dept: GENERAL RADIOLOGY | Facility: HOSPITAL | Age: 65
End: 2025-08-25
Payer: MEDICAID

## 2025-08-25 ENCOUNTER — HOSPITAL ENCOUNTER (EMERGENCY)
Facility: HOSPITAL | Age: 65
Discharge: HOME | End: 2025-08-25
Attending: EMERGENCY MEDICINE | Admitting: EMERGENCY MEDICINE
Payer: MEDICAID

## 2025-08-25 VITALS
RESPIRATION RATE: 18 BRPM | OXYGEN SATURATION: 94 % | DIASTOLIC BLOOD PRESSURE: 83 MMHG | TEMPERATURE: 98.4 F | SYSTOLIC BLOOD PRESSURE: 142 MMHG | HEART RATE: 71 BPM

## 2025-08-25 DIAGNOSIS — F41.0 PANIC ATTACK: Primary | ICD-10-CM

## 2025-08-25 DIAGNOSIS — R00.2 PALPITATIONS: ICD-10-CM

## 2025-08-25 LAB
ALBUMIN SERPL-MCNC: 4.2 G/DL (ref 3.5–5.2)
ALBUMIN/GLOB SERPL: 1.1 G/DL
ALP SERPL-CCNC: 76 U/L (ref 39–117)
ALT SERPL W P-5'-P-CCNC: 41 U/L (ref 1–41)
ANION GAP SERPL CALCULATED.3IONS-SCNC: 10.1 MMOL/L (ref 5–15)
ANISOCYTOSIS BLD QL: ABNORMAL
AST SERPL-CCNC: 27 U/L (ref 1–40)
BASOPHILS # BLD MANUAL: 0 10*3/MM3 (ref 0–0.2)
BASOPHILS NFR BLD MANUAL: 0 % (ref 0–1.5)
BILIRUB SERPL-MCNC: 0.6 MG/DL (ref 0–1.2)
BUN SERPL-MCNC: 15 MG/DL (ref 8–23)
BUN/CREAT SERPL: 15.8 (ref 7–25)
CALCIUM SPEC-SCNC: 9.1 MG/DL (ref 8.6–10.5)
CHLORIDE SERPL-SCNC: 98 MMOL/L (ref 98–107)
CO2 SERPL-SCNC: 25.9 MMOL/L (ref 22–29)
CREAT SERPL-MCNC: 0.95 MG/DL (ref 0.76–1.27)
DEPRECATED RDW RBC AUTO: 44.7 FL (ref 37–54)
EGFRCR SERPLBLD CKD-EPI 2021: 88.8 ML/MIN/1.73
EOSINOPHIL # BLD MANUAL: 0 10*3/MM3 (ref 0–0.4)
EOSINOPHIL NFR BLD MANUAL: 0 % (ref 0.3–6.2)
ERYTHROCYTE [DISTWIDTH] IN BLOOD BY AUTOMATED COUNT: 14 % (ref 12.3–15.4)
GLOBULIN UR ELPH-MCNC: 3.9 GM/DL
GLUCOSE SERPL-MCNC: 196 MG/DL (ref 65–99)
HCT VFR BLD AUTO: 51.7 % (ref 37.5–51)
HGB BLD-MCNC: 16.1 G/DL (ref 13–17.7)
LYMPHOCYTES # BLD MANUAL: 1.51 10*3/MM3 (ref 0.7–3.1)
LYMPHOCYTES NFR BLD MANUAL: 3 % (ref 5–12)
MACROCYTES BLD QL SMEAR: ABNORMAL
MCH RBC QN AUTO: 27.3 PG (ref 26.6–33)
MCHC RBC AUTO-ENTMCNC: 31.1 G/DL (ref 31.5–35.7)
MCV RBC AUTO: 87.8 FL (ref 79–97)
MONOCYTES # BLD: 0.41 10*3/MM3 (ref 0.1–0.9)
NEUTROPHILS # BLD AUTO: 11.81 10*3/MM3 (ref 1.7–7)
NEUTROPHILS NFR BLD MANUAL: 86 % (ref 42.7–76)
NRBC BLD AUTO-RTO: 0 /100 WBC (ref 0–0.2)
PLAT MORPH BLD: NORMAL
PLATELET # BLD AUTO: 196 10*3/MM3 (ref 140–450)
PMV BLD AUTO: 10.9 FL (ref 6–12)
POLYCHROMASIA BLD QL SMEAR: ABNORMAL
POTASSIUM SERPL-SCNC: 4.7 MMOL/L (ref 3.5–5.2)
PROT SERPL-MCNC: 8.1 G/DL (ref 6–8.5)
RBC # BLD AUTO: 5.89 10*6/MM3 (ref 4.14–5.8)
SODIUM SERPL-SCNC: 134 MMOL/L (ref 136–145)
TROPONIN T SERPL HS-MCNC: 12 NG/L
VARIANT LYMPHS NFR BLD MANUAL: 11 % (ref 19.6–45.3)
WBC MORPH BLD: NORMAL
WBC NRBC COR # BLD AUTO: 13.73 10*3/MM3 (ref 3.4–10.8)

## 2025-08-25 PROCEDURE — 85007 BL SMEAR W/DIFF WBC COUNT: CPT | Performed by: EMERGENCY MEDICINE

## 2025-08-25 PROCEDURE — 25010000002 DIAZEPAM PER 5 MG: Performed by: EMERGENCY MEDICINE

## 2025-08-25 PROCEDURE — 99284 EMERGENCY DEPT VISIT MOD MDM: CPT

## 2025-08-25 PROCEDURE — 85025 COMPLETE CBC W/AUTO DIFF WBC: CPT | Performed by: EMERGENCY MEDICINE

## 2025-08-25 PROCEDURE — 71045 X-RAY EXAM CHEST 1 VIEW: CPT

## 2025-08-25 PROCEDURE — 93005 ELECTROCARDIOGRAM TRACING: CPT | Performed by: EMERGENCY MEDICINE

## 2025-08-25 PROCEDURE — 84484 ASSAY OF TROPONIN QUANT: CPT | Performed by: EMERGENCY MEDICINE

## 2025-08-25 PROCEDURE — 80053 COMPREHEN METABOLIC PANEL: CPT | Performed by: EMERGENCY MEDICINE

## 2025-08-25 PROCEDURE — 96374 THER/PROPH/DIAG INJ IV PUSH: CPT

## 2025-08-25 RX ORDER — DIAZEPAM 10 MG/2ML
5 INJECTION, SOLUTION INTRAMUSCULAR; INTRAVENOUS ONCE
Status: COMPLETED | OUTPATIENT
Start: 2025-08-25 | End: 2025-08-25

## 2025-08-25 RX ORDER — CLONAZEPAM 0.5 MG/1
0.5 TABLET ORAL DAILY PRN
Qty: 5 TABLET | Refills: 0 | Status: SHIPPED | OUTPATIENT
Start: 2025-08-25

## 2025-08-25 RX ADMIN — DIAZEPAM 5 MG: 5 INJECTION, SOLUTION INTRAMUSCULAR; INTRAVENOUS at 20:16

## 2025-08-26 LAB
QT INTERVAL: 372 MS
QTC INTERVAL: 403 MS